# Patient Record
Sex: FEMALE | Race: WHITE | NOT HISPANIC OR LATINO | URBAN - METROPOLITAN AREA
[De-identification: names, ages, dates, MRNs, and addresses within clinical notes are randomized per-mention and may not be internally consistent; named-entity substitution may affect disease eponyms.]

---

## 2017-02-07 ENCOUNTER — ALLSCRIPTS OFFICE VISIT (OUTPATIENT)
Dept: OTHER | Facility: OTHER | Age: 47
End: 2017-02-07

## 2017-04-25 ENCOUNTER — ALLSCRIPTS OFFICE VISIT (OUTPATIENT)
Dept: OTHER | Facility: OTHER | Age: 47
End: 2017-04-25

## 2017-05-23 ENCOUNTER — ALLSCRIPTS OFFICE VISIT (OUTPATIENT)
Dept: OTHER | Facility: OTHER | Age: 47
End: 2017-05-23

## 2017-06-15 DIAGNOSIS — M79.89 OTHER DISORDERS OF SOFT TISSUE: ICD-10-CM

## 2017-06-22 ENCOUNTER — ALLSCRIPTS OFFICE VISIT (OUTPATIENT)
Dept: OTHER | Facility: OTHER | Age: 47
End: 2017-06-22

## 2017-06-30 ENCOUNTER — TRANSCRIBE ORDERS (OUTPATIENT)
Dept: ADMINISTRATIVE | Facility: HOSPITAL | Age: 47
End: 2017-06-30

## 2017-06-30 ENCOUNTER — APPOINTMENT (OUTPATIENT)
Dept: LAB | Facility: MEDICAL CENTER | Age: 47
End: 2017-06-30
Payer: COMMERCIAL

## 2017-06-30 DIAGNOSIS — M79.89 OTHER DISORDERS OF SOFT TISSUE: ICD-10-CM

## 2017-06-30 LAB — GLUCOSE P FAST SERPL-MCNC: 80 MG/DL (ref 65–99)

## 2017-06-30 PROCEDURE — 36415 COLL VENOUS BLD VENIPUNCTURE: CPT

## 2017-06-30 PROCEDURE — 82947 ASSAY GLUCOSE BLOOD QUANT: CPT

## 2017-07-11 ENCOUNTER — ALLSCRIPTS OFFICE VISIT (OUTPATIENT)
Dept: OTHER | Facility: OTHER | Age: 47
End: 2017-07-11

## 2017-07-11 LAB — HBA1C MFR BLD HPLC: 5.1 %

## 2017-08-01 DIAGNOSIS — E03.9 HYPOTHYROIDISM: ICD-10-CM

## 2017-08-01 DIAGNOSIS — R51 HEADACHE(784.0): ICD-10-CM

## 2017-08-01 DIAGNOSIS — R73.01 IMPAIRED FASTING GLUCOSE: ICD-10-CM

## 2017-08-01 DIAGNOSIS — R10.9 ABDOMINAL PAIN: ICD-10-CM

## 2017-08-01 DIAGNOSIS — E89.0 POSTPROCEDURAL HYPOTHYROIDISM: ICD-10-CM

## 2017-08-29 ENCOUNTER — GENERIC CONVERSION - ENCOUNTER (OUTPATIENT)
Dept: OTHER | Facility: OTHER | Age: 47
End: 2017-08-29

## 2017-08-31 ENCOUNTER — GENERIC CONVERSION - ENCOUNTER (OUTPATIENT)
Dept: OTHER | Facility: OTHER | Age: 47
End: 2017-08-31

## 2017-09-07 ENCOUNTER — GENERIC CONVERSION - ENCOUNTER (OUTPATIENT)
Dept: OTHER | Facility: OTHER | Age: 47
End: 2017-09-07

## 2017-10-10 ENCOUNTER — ALLSCRIPTS OFFICE VISIT (OUTPATIENT)
Dept: OTHER | Facility: OTHER | Age: 47
End: 2017-10-10

## 2017-10-11 NOTE — PROGRESS NOTES
Assessment  1  Acute URI (465 9) (J06 9)   2  Acute sinusitis (461 9) (J01 90)   3  Allergic rhinitis (477 9) (J30 9)   4  Cough (786 2) (R05)    Plan  Acute sinusitis    · Azithromycin 500 MG Oral Tablet; TAKE 1 TABLET DAILY UNTIL FINISHED  Acute sinusitis, Acute URI, Allergic rhinitis    · PredniSONE 10 MG Oral Tablet; TAKE 6 TABLETS TODAY, THEN DECREASE BY 1  TABLET EACH DAY UNTIL GONE  Acute sinusitis, Acute URI, Allergic rhinitis, Cough    · Follow-up PRN Evaluation and Treatment  Follow-up  Status: Complete  Done:  38CET3732 04:21PM    Discussion/Summary    Rest and fluids, start abx and prednisone and also advil cold and sinus prn congestion and Mucinex DM if not better with prednisone  Chief Complaint  started sunday with a headache and pressure - now has sore throat, mucus, coughing, sneezing, congestion, achy, right ear pain, hot and cold spells      History of Present Illness  HPI: Pt  started sunday with a headache and sinus pressure - now has sore throat, mucus, coughing, sneezing, congestion, achy, right ear pain, hot and cold spells  Hx of seasonal allergies and always worse this time of year  No cp or sob  Review of Systems    Constitutional: No fever, no chills, feels well, no tiredness, no recent weight gain or loss  ENT: as noted in HPI  Cardiovascular: no complaints of slow or fast heart rate, no chest pain, no palpitations, no leg claudication or lower extremity edema  Respiratory: as noted in HPI  Breasts: no complaints of breast pain, breast lump or nipple discharge  Gastrointestinal: no complaints of abdominal pain, no constipation, no nausea or diarrhea, no vomiting, no bloody stools  Genitourinary: no complaints of dysuria, no incontinence, no pelvic pain, no dysmenorrhea, no vaginal discharge or abnormal vaginal bleeding  Musculoskeletal: no complaints of arthralgia, no myalgia, no joint swelling or stiffness, no limb pain or swelling     Integumentary: no complaints of skin rash or lesion, no itching or dry skin, no skin wounds  Neurological: no complaints of headache, no confusion, no numbness or tingling, no dizziness or fainting  Active Problems  1  Abdominal pain (789 00) (R10 9)   2  Abnormal fasting glucose (790 29) (R73 01)   3  Acute low back pain (724 2) (M54 5)   4  Acute sinusitis (461 9) (J01 90)   5  Acute URI (465 9) (J06 9)   6  Allergic rhinitis (477 9) (J30 9)   7  Back pain (724 5) (M54 9)   8  Bilateral edema of lower extremity (782 3) (R60 0)   9  Breast lump (611 72) (N63)   10  Breast pain (611 71) (N64 4)   11  Carpal tunnel syndrome, unspecified laterality (354 0) (G56 00)   12  Closed head injury (959 01) (S09 90XA)   13  Concussion (850 9) (S06 0X9A)   14  Depression (311) (F32 9)   15  Ear ache (388 70) (H92 09)   16  Encounter for screening colonoscopy (V76 51) (Z12 11)   17  Eustachian tube disorder, left (381 9) (H69 92)   18  Eustachian tube dysfunction (381 81) (H69 80)   19  Female pelvic pain (625 9) (R10 2)   20  Folliculitis (663 4) (R15 1)   21  Hand pain, unspecified laterality   22  Headache (784 0) (R51)   23  Herpes simplex type 1 infection (054 9) (B00 9)   24  Hypothyroidism (244 9) (E03 9)   25  Laceration of face (873 40) (S01 81XA)   26  Left knee pain (719 46) (M25 562)   27  Low back pain (724 2) (M54 5)   28  Lumbar degenerative disc disease (722 52) (M51 36)   29  Neck pain (723 1) (M54 2)   30  Need for influenza vaccination (V04 81) (Z23)   31  Ovarian cyst (620 2) (N83 20)   32  Pain of left thigh (729 5) (M79 652)   33  Pain of right hand (729 5) (M79 641)   34  Papillary carcinoma of thyroid (193) (C73)   35  Pelvic and perineal pain (625 9) (R10 2)   36  Plantar fasciitis (728 71) (M72 2)   37  Postprocedural hypothyroidism (244 0) (E89 0)   38  Sacroiliac pain (724 6) (M53 3)   39  Sciatica of right side (724 3) (M54 31)   40  Seasonal allergies (477 9) (J30 2)   41   Somatic dysfunction of cervical region (739 1) (M99 01)   42  Somatic dysfunction of lumbar region (739 3) (M99 03)   43  Somatic dysfunction of spine, thoracic (739 2) (M99 02)   44  Sore throat (462) (J02 9)   45  Strain of lumbar region, initial encounter (847 2) (S39 012A)   46  Swelling of extremity (729 81) (M79 89)   47  Tinnitus of both ears (388 30) (H93 13)   48  UTI symptoms (788 99) (R39 9)   49  Vitamin D deficiency (268 9) (E55 9)    Past Medical History  1  History of Cervical neuritis (723 4) (M54 12)   2  History of Cervical Neuritis C7 - T1 On The Left (723 4)   3  History of Chest congestion (786 9) (R09 89)   4  History of Cough (786 2) (R05)   5  History of Cough (786 2) (R05)   6  History of Encounter for routine gynecological examination (V72 31) (Z01 419)   7  History of Encounter for screening mammogram for malignant neoplasm of breast   (V76 12) (Z12 31)   8  History of Endometriosis, cervix (617 0) (N80 0)   9  History of abdominal pain (V13 89) (Z87 898)   10  History of acute sinusitis (V12 69) (Z87 09)   11  History of constipation (V12 79) (Z87 19)   12  History of hyperlipidemia (V12 29) (Z86 39)   13  History of pregnancy (V13 29)   14  History of upper respiratory infection (V12 09) (Z87 09)   15  History of upper respiratory infection (V12 09) (Z87 09)   16  History of Laceration Of Finger (883 0)   17  History of Nasal congestion (478 19) (R09 81)   18  History of Need for influenza vaccination (V04 81) (Z23)   19  History of Somatic dysfunction of cervical region (739 1) (M99 01)   20  History of Somatic dysfunction of lumbar region (739 3) (M99 03)   21  History of Somatic dysfunction of thoracic region (739 2) (M99 02)   22  History of Thyroid Cancer (V10 87)   23  History of Urinary frequency (788 41) (R35 0)   24  History of Urinary tract infection (599 0) (N39 0)   25  History of Urinary Tract Infection (V13 02)    Family History  Mother    1  Family history of Thyroid Disorder (V18 19)  Sister    2   Family history of Thyroid Disorder (V18 19)  Family History    3  Family history of Cancer   4  Family history of Diabetes Mellitus (V18 0)    Social History   · Denied: History of Alcohol Use (History)   · Being A Social Drinker   · Denied: History of Drug Use   · Never A Smoker   · Non-smoker (V49 89) (Z78 9)    Surgical History  1  History of Complete Colonoscopy   2  History of Hysterectomy   3  History of Thyroid Surgery Total Thyroidectomy   4  History of Tubal Ligation    Current Meds   1  Calcium TABS; Therapy: (Recorded:80Brq6781) to Recorded   2  CVS Glucosamine Complex TABS; Therapy: (Recorded:20Sdi1857) to Recorded   3  CVS Vitamin D3 CAPS; Therapy: (Jerome Humphreys) to Recorded   4  Daily Vitamins Oral Tablet; Take 1 tablet daily; Therapy: (Recorded:21Jdg2348) to Recorded   5  Fish Oil CAPS; Therapy: (Stephen Martinez) to Recorded   6  HM Vitamin B12 TABS; Take 1 tablet daily; Therapy: (Jerome Humphreys) to Recorded   7  HydroCHLOROthiazide 12 5 MG Oral Tablet; TAKE 1 TABLET Daily 1 po tab prn edema; Therapy: 20HSV2509 to ([email or web address] Nohemi)  Requested for: 55JWQ9125; Last   Rx:55Utz3564 Ordered   8  Levothyroxine Sodium 100 MCG Oral Tablet; take 1 tablet every day; Therapy: 47PLC7759 to (VOZth)  Requested for: 54YCP9217; Last   Rx:95Gzx9922 Ordered   9  Lysine TABS; Therapy: (96 86 26) to Recorded   10  ValACYclovir HCl TABS; Therapy: (Stephen Martinez) to Recorded   11  Vitamin B6 TABS; Therapy: (Recorded:83Hsf2328) to Recorded    Allergies  1  Gabapentin CAPS  2  Animal dander - Cats   3  Animal dander - Dogs   4  Bee sting   5  Dust Mite   6  Grass   7  Other   8   Trees    Vitals   Recorded: 85MXL2842 03:59PM Recorded: 89DLW8349 03:54PM   Temperature 02 8 F    Systolic  648   Diastolic  60   Height  5 ft 5 5 in   Weight  186 lb    BMI Calculated  30 48   BSA Calculated  1 93     Physical Exam    Constitutional   General appearance: No acute distress, well appearing and well nourished  Eyes   Conjunctiva and lids: No swelling, erythema or discharge  Pupils and irises: Equal, round and reactive to light  Ears, Nose, Mouth, and Throat   External inspection of ears and nose: Normal     Otoscopic examination: Abnormal  -left TM bulging  Nasal mucosa, septum, and turbinates: Abnormal  -sinusitis  Oropharynx: Abnormal  -pnd  Pulmonary   Respiratory effort: No increased work of breathing or signs of respiratory distress  Auscultation of lungs: Abnormal  -cough  Cardiovascular   Palpation of heart: Normal PMI, no thrills  Auscultation of heart: Normal rate and rhythm, normal S1 and S2, without murmurs  Examination of extremities for edema and/or varicosities: Normal     Carotid pulses: Normal     Lymphatic   Palpation of lymph nodes in neck: No lymphadenopathy  Musculoskeletal   Gait and station: Normal     Digits and nails: Normal without clubbing or cyanosis  Inspection/palpation of joints, bones, and muscles: Normal     Skin   Skin and subcutaneous tissue: Normal without rashes or lesions  Neurologic   Cranial nerves: Cranial nerves 2-12 intact  Reflexes: 2+ and symmetric  Sensation: No sensory loss      Psychiatric   Orientation to person, place, and time: Normal     Mood and affect: Normal          Signatures   Electronically signed by : Berlin Garcia DO; Oct 10 2017  4:28PM EST                       (Author)

## 2017-11-03 ENCOUNTER — ALLSCRIPTS OFFICE VISIT (OUTPATIENT)
Dept: OTHER | Facility: OTHER | Age: 47
End: 2017-11-03

## 2018-01-10 NOTE — PROGRESS NOTES
Chief Complaint  pt here today for a UA, with c/o UTI sx  Active Problems    1  Abdominal pain (789 00) (R10 9)   2  Acute low back pain (724 2) (M54 5)   3  Acute sinusitis (461 9) (J01 90)   4  Allergic rhinitis (477 9) (J30 9)   5  Back pain (724 5) (M54 9)   6  Breast lump (611 72) (N63)   7  Breast pain (611 71) (N64 4)   8  Carpal tunnel syndrome, unspecified laterality (354 0) (G56 00)   9  Closed head injury (959 01) (S09 90XA)   10  Concussion (850 9) (S06 0X9A)   11  Depression (311) (F32 9)   12  Ear ache (388 70) (H92 09)   13  Encounter for screening colonoscopy (V76 51) (Z12 11)   14  Eustachian tube dysfunction (381 81) (H69 80)   15  Female pelvic pain (625 9) (R10 2)   16  Folliculitis (867 9) (H58 9)   17  Hand pain, unspecified laterality   18  Headache (784 0) (R51)   19  Herpes simplex type 1 infection (054 9) (B00 9)   20  Hypothyroidism (244 9) (E03 9)   21  Laceration of face (873 40) (S01 81XA)   22  Left knee pain (719 46) (M25 562)   23  Low back pain (724 2) (M54 5)   24  Lumbar degenerative disc disease (722 52) (M51 36)   25  Neck pain (723 1) (M54 2)   26  Need for influenza vaccination (V04 81) (Z23)   27  Ovarian cyst (620 2) (N83 20)   28  Pain of right hand (729 5) (M79 641)   29  Papillary carcinoma of thyroid (193) (C73)   30  Pelvic and perineal pain (625 9) (R10 2)   31  Postprocedural hypothyroidism (244 0) (E89 0)   32  Sacroiliac pain (724 6) (M53 3)   33  Sciatica of right side (724 3) (M54 31)   34  Seasonal allergies (477 9) (J30 2)   35  Somatic dysfunction of cervical region (739 1) (M99 01)   36  Somatic dysfunction of lumbar region (739 3) (M99 03)   37  Somatic dysfunction of spine, thoracic (739 2) (M99 02)   38  Strain of lumbar region, initial encounter (847 2) (S39 012A)   39  Tinnitus of both ears (388 30) (H93 13)    Current Meds   1  Calcium TABS; Therapy: (Recorded:16Oxk0850) to Recorded   2  CVS Glucosamine Complex TABS;    Therapy: (Recorded:50Zrc3603) to Recorded   3  CVS Vitamin D3 CAPS; Therapy: (Rolinda Muster) to Recorded   4  Daily Vitamins Oral Tablet; Take 1 tablet daily; Therapy: (Recorded:81Kzn2926) to Recorded   5  Fish Oil CAPS; Therapy: (Jahaira Barge) to Recorded   6  HM Vitamin B12 TABS; Take 1 tablet daily; Therapy: (Rolinda Muster) to Recorded   7  Levothyroxine Sodium 100 MCG Oral Tablet; take 1 tablet every day; Therapy: 86HVG1744 to (Anjum Zelaya)  Requested for: 26NSF3116; Last   Rx:14Nov2013 Ordered   8  Lysine TABS; Therapy: (Nicole Iha) to Recorded   9  PredniSONE 10 MG Oral Tablet; TAKE 6 TABLETS TODAY, THEN DECREASE BY 1   TABLET EACH DAY UNTIL GONE;   Therapy: 89ADG5842 to (Last Rx:24Fsu0608)  Requested for: 24Fje7704 Ordered   10  ValACYclovir HCl TABS; Therapy: (Jahaira Barge) to Recorded   11  Vitamin B6 TABS; Therapy: (Recorded:97Xvd3344) to Recorded    Allergies    1  Gabapentin CAPS    2  Animal dander - Cats   3  Animal dander - Dogs   4  Bee sting   5  Dust Mite   6  Grass   7  Other   8  Trees    Results/Data  Urine Dip Non-Automated- POC 68ETF8530 32:56KD Gabriela Multani     Test Name Result Flag Reference   Color Yellow     Clarity Transparent     Leukocytes ++     Nitrite +     Blood mod 5-10     Bilirubin neg     Urobilinogen 0 2     Protein +     Ph 5 0     Specific Gravity 1 030     Ketone neg     Glucose neg         Plan  UTI symptoms    · (1) URINE CULTURE; Source:Urine, Clean Catch; Status: In Progress - Specimen/Data  Collected,Retrospective By Protocol Authorization;   Done: 45OLA6046   · Urine Dip Non-Automated- POC; Status:Complete - Retrospective By Protocol  Authorization;   Done: 23QOI5168 04:56PM    Signatures   Electronically signed by : Tammie White DO; Oct 13 3537  3:38PM EST                       (Author)

## 2018-01-11 NOTE — PROGRESS NOTES
Chief Complaint  Pt presented for influenza vaccine; administered without incident and tolerated well  ksd,cma      Active Problems    1  Abdominal pain (789 00) (R10 9)   2  Abnormal fasting glucose (790 29) (R73 01)   3  Acute low back pain (724 2) (M54 5)   4  Acute sinusitis (461 9) (J01 90)   5  Acute URI (465 9) (J06 9)   6  Allergic rhinitis (477 9) (J30 9)   7  Back pain (724 5) (M54 9)   8  Bilateral edema of lower extremity (782 3) (R60 0)   9  Breast lump (611 72) (N63)   10  Breast pain (611 71) (N64 4)   11  Carpal tunnel syndrome, unspecified laterality (354 0) (G56 00)   12  Closed head injury (959 01) (S09 90XA)   13  Concussion (850 9) (S06 0X9A)   14  Cough (786 2) (R05)   15  Depression (311) (F32 9)   16  Ear ache (388 70) (H92 09)   17  Encounter for screening colonoscopy (V76 51) (Z12 11)   18  Eustachian tube disorder, left (381 9) (H69 92)   19  Eustachian tube dysfunction (381 81) (H69 80)   20  Female pelvic pain (625 9) (R10 2)   21  Folliculitis (652 4) (R60 2)   22  Hand pain, unspecified laterality   23  Headache (784 0) (R51)   24  Herpes simplex type 1 infection (054 9) (B00 9)   25  Hypothyroidism (244 9) (E03 9)   26  Laceration of face (873 40) (S01 81XA)   27  Left knee pain (719 46) (M25 562)   28  Low back pain (724 2) (M54 5)   29  Lumbar degenerative disc disease (722 52) (M51 36)   30  Neck pain (723 1) (M54 2)   31  Need for influenza vaccination (V04 81) (Z23)   32  Ovarian cyst (620 2) (N83 20)   33  Pain of left thigh (729 5) (M79 652)   34  Pain of right hand (729 5) (M79 641)   35  Papillary carcinoma of thyroid (193) (C73)   36  Pelvic and perineal pain (625 9) (R10 2)   37  Plantar fasciitis (728 71) (M72 2)   38  Postprocedural hypothyroidism (244 0) (E89 0)   39  Sacroiliac pain (724 6) (M53 3)   40  Sciatica of right side (724 3) (M54 31)   41  Seasonal allergies (477 9) (J30 2)   42  Somatic dysfunction of cervical region (739 1) (M99 01)   43   Somatic dysfunction of lumbar region (739 3) (M99 03)   44  Somatic dysfunction of spine, thoracic (739 2) (M99 02)   45  Sore throat (462) (J02 9)   46  Strain of lumbar region, initial encounter (847 2) (S39 012A)   47  Swelling of extremity (729 81) (M79 89)   48  Tinnitus of both ears (388 30) (H93 13)   49  UTI symptoms (788 99) (R39 9)   50  Vitamin D deficiency (268 9) (E55 9)    Current Meds   1  Azithromycin 500 MG Oral Tablet; TAKE 1 TABLET DAILY UNTIL FINISHED; Therapy: 82BMY0077 to (Evaluate:13Oct2017)  Requested for: 68NUC7127; Last   Rx:10Oct2017 Ordered   2  Calcium TABS; Therapy: (Recorded:23Xcl1313) to Recorded   3  Cefdinir 300 MG Oral Capsule; TAKE 1 CAPSULE TWICE DAILY; Therapy: 77LFN0238 to (90938 52 84 57)  Requested for: 02GSL1142; Last   Rx:17Oct2017 Ordered   4  CVS Glucosamine Complex TABS; Therapy: (Recorded:53Pyk3526) to Recorded   5  CVS Vitamin D3 CAPS; Therapy: (Angelic Lay) to Recorded   6  Daily Vitamins Oral Tablet; Take 1 tablet daily; Therapy: (Angelic Lay) to Recorded   7  Fish Oil CAPS; Therapy: (Larwence Crisp) to Recorded   8  HM Vitamin B12 TABS; Take 1 tablet daily; Therapy: (Angelic Lay) to Recorded   9  HydroCHLOROthiazide 12 5 MG Oral Tablet; TAKE 1 TABLET Daily 1 po tab prn edema; Therapy: 87BOT9775 to (Gerhardt Carbon)  Requested for: 62OYP4552; Last   Rx:00Mpi2453 Ordered   10  Levothyroxine Sodium 100 MCG Oral Tablet; take 1 tablet every day; Therapy: 87KAX3829 to (Ivette Salmon)  Requested for: 52YCU5035; Last    Rx:14Nov2013 Ordered   11  Lysine TABS; Therapy: (Canelo Galla) to Recorded   12  PredniSONE 10 MG Oral Tablet; TAKE 6 TABLETS TODAY, THEN DECREASE BY 1    TABLET EACH DAY UNTIL GONE;    Therapy: 65HWH0902 to (Last Rx:10Oct2017)  Requested for: 11IBQ0410 Ordered   13  ValACYclovir HCl TABS; Therapy: (Larwence Crisp) to Recorded   14  Vitamin B6 TABS;     Therapy: (Recorded:86Gek8906) to Recorded    Allergies    1  Gabapentin CAPS    2  Animal dander - Cats   3  Animal dander - Dogs   4  Bee sting   5  Dust Mite   6  Grass   7  Other   8   Trees    Plan  Flu vaccine need    · Fluzone Quadrivalent Intramuscular Suspension    Signatures   Electronically signed by : Alexander Wong DO; Nov  3 2017  9:11AM EST                       (Author)

## 2018-01-12 VITALS
BODY MASS INDEX: 29.79 KG/M2 | SYSTOLIC BLOOD PRESSURE: 108 MMHG | DIASTOLIC BLOOD PRESSURE: 60 MMHG | WEIGHT: 185.38 LBS | HEIGHT: 66 IN

## 2018-01-13 VITALS
WEIGHT: 181 LBS | DIASTOLIC BLOOD PRESSURE: 68 MMHG | SYSTOLIC BLOOD PRESSURE: 102 MMHG | TEMPERATURE: 98.1 F | HEIGHT: 66 IN | BODY MASS INDEX: 29.09 KG/M2

## 2018-01-13 VITALS
BODY MASS INDEX: 29.89 KG/M2 | DIASTOLIC BLOOD PRESSURE: 60 MMHG | HEIGHT: 66 IN | SYSTOLIC BLOOD PRESSURE: 100 MMHG | TEMPERATURE: 99.8 F | WEIGHT: 186 LBS

## 2018-01-13 VITALS
DIASTOLIC BLOOD PRESSURE: 68 MMHG | SYSTOLIC BLOOD PRESSURE: 100 MMHG | HEIGHT: 66 IN | WEIGHT: 188 LBS | BODY MASS INDEX: 30.22 KG/M2

## 2018-01-13 NOTE — PROGRESS NOTES
Assessment    1  Acute low back pain (724 2) (M54 5)   2  Sciatica of right side (724 3) (M54 31)   3  Hypothyroidism (244 9) (E03 9)   4  Acute sinusitis (461 9) (J01 90)    Plan  Acute low back pain, Acute sinusitis, Hypothyroidism, Sciatica of right side    · Follow-up PRN Evaluation and Treatment  Follow-up  Status: Complete  Done:  [id number]  Acute sinusitis    · Azithromycin 500 MG Oral Tablet; TAKE 1 TABLET DAILY UNTIL FINISHED   · Fluticasone Propionate 50 MCG/ACT Nasal Suspension; USE 2 SPRAYS IN EACH  NOSTRIL DAILY   · PredniSONE 10 MG Oral Tablet; TAKE 6 TABLETS TODAY, THEN DECREASE BY 1  TABLET EACH DAY UNTIL GONE    Discussion/Summary    Rest and fluids, Mucinex D prn, start meds as directed and use [place] or Icy hot prn low back and right buttock area prn low back pain and right sciatica  The patient was counseled regarding  Possible side effects of new medications were reviewed with the patient/guardian today  The treatment plan was reviewed with the patient/guardian  The patient/guardian understands and agrees with the treatment plan      Chief Complaint  PT is here today for coughing, sneezing, white colored mucus, runny nose, water eyes, congestion for 1 week  PT also states she has lower R back pain and radiates down in to her leg  History of Present Illness  HPI: PT is here today for coughing, sneezing, white colored mucus, runny nose, water eyes, congestion for 1 week  PT also states she has lower R back pain and radiates down in to her leg  Low right sided back pain  Dx as a strain at urgent care  No xrays done  Review of Systems    Constitutional: No fever, no chills, feels well, no tiredness, no recent weight gain or loss  ENT: as noted in HPI  Cardiovascular: no complaints of slow or fast heart rate, no chest pain, no palpitations, no leg claudication or lower extremity edema  Respiratory: as noted in HPI     Breasts: no complaints of breast pain, breast lump or nipple discharge  Gastrointestinal: no complaints of abdominal pain, no constipation, no nausea or diarrhea, no vomiting, no bloody stools  Genitourinary: no complaints of dysuria, no incontinence, no pelvic pain, no dysmenorrhea, no vaginal discharge or abnormal vaginal bleeding  Musculoskeletal: as noted in HPI  Integumentary: no complaints of skin rash or lesion, no itching or dry skin, no skin wounds  Neurological: no complaints of headache, no confusion, no numbness or tingling, no dizziness or fainting  Active Problems    1  Abdominal pain (789 00) (R10 9)   2  Allergic rhinitis (477 9) (J30 9)   3  Back pain (724 5) (M54 9)   4  Breast lump (611 72) (N63)   5  Breast pain (611 71) (N64 4)   6  Carpal tunnel syndrome, unspecified laterality (354 0) (G56 00)   7  Closed head injury (959 01) (S09 90XA)   8  Concussion (850 9) (S06 0X9A)   9  Depression (311) (F32 9)   10  Ear ache (388 70) (H92 09)   11  Encounter for screening colonoscopy (V76 51) (Z12 11)   12  Female pelvic pain (625 9) (R10 2)   13  Folliculitis (554 2) (H13 5)   14  Hand pain, unspecified laterality   15  Headache (784 0) (R51)   16  Herpes simplex type 1 infection (054 9) (B00 9)   17  Hypothyroidism (244 9) (E03 9)   18  Laceration of face (873 40) (S01 81XA)   19  Left knee pain (719 46) (M25 562)   20  Low back pain (724 2) (M54 5)   21  Lumbar degenerative disc disease (722 52) (M51 36)   22  Neck pain (723 1) (M54 2)   23  Need for influenza vaccination (V04 81) (Z23)   24  Ovarian cyst (620 2) (N83 20)   25  Papillary carcinoma of thyroid (193) (C73)   26  Pelvic and perineal pain (625 9) (R10 2)   27  Postprocedural hypothyroidism (244 0) (E89 0)   28  Sacroiliac pain (724 6) (M53 3)   29  Somatic dysfunction of cervical region (739 1) (M99 01)   30  Somatic dysfunction of lumbar region (739 3) (M99 03)   31  Somatic dysfunction of spine, thoracic (739 2) (M99 02)   32   Strain of lumbar region, initial encounter (847 2) (Y26 940S)    Past Medical History    1  History of Cervical neuritis (723 4) (M54 12)   2  History of Cervical Neuritis C7 - T1 On The Left (723 4)   3  History of Chest congestion (786 9) (R09 89)   4  History of Cough (786 2) (R05)   5  History of Cough (786 2) (R05)   6  History of Encounter for routine gynecological examination (V72 31) (Z01 419)   7  History of Encounter for screening mammogram for malignant neoplasm of breast   (V76 12) (Z12 31)   8  History of Endometriosis, cervix (617 0) (N80 0)   9  History of abdominal pain (V13 89) (Z87 898)   10  History of acute sinusitis (V12 69) (Z87 09)   11  History of constipation (V12 79) (Z87 19)   12  History of hyperlipidemia (V12 29) (Z86 39)   13  History of pregnancy (V13 29)   14  History of upper respiratory infection (V12 09) (Z87 09)   15  History of upper respiratory infection (V12 09) (Z87 09)   16  History of Laceration Of Finger (883 0)   17  History of Nasal congestion (478 19) (R09 81)   18  History of Need for influenza vaccination (V04 81) (Z23)   19  History of Somatic dysfunction of cervical region (739 1) (M99 01)   20  History of Somatic dysfunction of lumbar region (739 3) (M99 03)   21  History of Somatic dysfunction of thoracic region (739 2) (M99 02)   22  History of Thyroid Cancer (V10 87)   23  History of Urinary frequency (788 41) (R35 0)   24  History of Urinary tract infection (599 0) (N39 0)   25  History of Urinary Tract Infection (V13 02)    Family History    1  Family history of Thyroid Disorder (V18 19)    2  Family history of Thyroid Disorder (V18 19)    3  Family history of Cancer   4  Family history of Diabetes Mellitus (V18 0)    Social History    · Denied: History of Alcohol Use (History)   · Being A Social Drinker   · Denied: History of Drug Use   · Never A Smoker   · Non-smoker (V49 89) (Z78 9)    Surgical History    1  History of Complete Colonoscopy   2  History of Hysterectomy   3   History of Thyroid Surgery Total Thyroidectomy   4  History of Tubal Ligation    Current Meds   1  Calcium TABS; Therapy: (Recorded:48Alj1044) to Recorded   2  [facility] Glucosamine Complex TABS; Therapy: (Recorded:16Oqv1160) to Recorded   3  [facility] Vitamin D3 CAPS; Therapy: (Kulwinder Salmon) to Recorded   4  Daily Vitamins Oral Tablet; Take 1 tablet daily; Therapy: (Recorded:45Xjj2943) to Recorded   5  HM Vitamin B12 TABS; Take 1 tablet daily; Therapy: (Kulwinder Salmon) to Recorded   6  Levothyroxine Sodium 100 MCG Oral Tablet; take 1 tablet every day; Therapy: 33DWO5622 to (Millie Blanco)  Requested for: 76POI2428; Last   Rx:14Nov2013 Ordered   7  Lysine TABS; Therapy: (0688 [id number]) to Recorded   8  Naproxen 500 MG Oral Tablet; TAKE 1 TABLET EVERY 12 HOURS AS NEEDED; Therapy: 02SXZ7744 to (Evaluate:19Jan2016)  Requested for: 11EYB1766; Last   Rx:04Jan2016 Ordered   9  Vitamin B6 TABS; Therapy: (Recorded:82Xzw4913) to Recorded    Allergies    1  Gabapentin CAPS    2  Animal dander - Cats   3  Animal dander - Dogs   4  Bee sting   5  Dust Mite   6  Grass   7  Other   8  Trees    Vitals   Recorded: 32VBC3741 64:45IV   Systolic 196   Diastolic 70   Weight 565 lb      Physical Exam    Constitutional   General appearance: No acute distress, well appearing and well nourished  Eyes   Conjunctiva and lids: No swelling, erythema or discharge  Pupils and irises: Equal, round and reactive to light  Ears, Nose, Mouth, and Throat   External inspection of ears and nose: Normal     Otoscopic examination: Tympanic membranes translucent with normal light reflex  Canals patent without erythema  Nasal mucosa, septum, and turbinates: Abnormal   nasal congestion  Oropharynx: Abnormal   pnd    Pulmonary   Respiratory effort: No increased work of breathing or signs of respiratory distress  Auscultation of lungs: Abnormal   cough  Cardiovascular   Palpation of heart: Normal PMI, no thrills      Auscultation of heart: Normal rate and rhythm, normal S1 and S2, without murmurs  Examination of extremities for edema and/or varicosities: Normal     Carotid pulses: Normal     Lymphatic   Palpation of lymph nodes in neck: No lymphadenopathy  Musculoskeletal   Gait and station: Normal     Digits and nails: Normal without clubbing or cyanosis  Inspection/palpation of joints, bones, and muscles: Abnormal   right sided sciatica and low back pain right side  Skin   Skin and subcutaneous tissue: Normal without rashes or lesions  Neurologic   Cranial nerves: Cranial nerves 2-12 intact  Reflexes: 2+ and symmetric  Sensation: No sensory loss      Psychiatric   Orientation to person, place, and time: Normal     Mood and affect: Normal          Signatures   Electronically signed by : Nuzhat Esquivel DO; Jan 21 2016  1:37PM EST                       (Author)

## 2018-01-14 VITALS
SYSTOLIC BLOOD PRESSURE: 110 MMHG | WEIGHT: 184.13 LBS | DIASTOLIC BLOOD PRESSURE: 60 MMHG | BODY MASS INDEX: 29.59 KG/M2 | HEIGHT: 66 IN

## 2018-01-14 VITALS
DIASTOLIC BLOOD PRESSURE: 82 MMHG | HEIGHT: 66 IN | BODY MASS INDEX: 29.63 KG/M2 | SYSTOLIC BLOOD PRESSURE: 112 MMHG | TEMPERATURE: 97.9 F | WEIGHT: 184.38 LBS

## 2018-01-22 VITALS
WEIGHT: 185.13 LBS | SYSTOLIC BLOOD PRESSURE: 108 MMHG | BODY MASS INDEX: 29.75 KG/M2 | DIASTOLIC BLOOD PRESSURE: 74 MMHG | HEIGHT: 66 IN

## 2018-08-17 ENCOUNTER — TRANSCRIBE ORDERS (OUTPATIENT)
Dept: URGENT CARE | Age: 48
End: 2018-08-17

## 2018-08-17 ENCOUNTER — APPOINTMENT (OUTPATIENT)
Dept: LAB | Age: 48
End: 2018-08-17
Payer: COMMERCIAL

## 2018-08-17 DIAGNOSIS — E89.0 POSTSURGICAL HYPOTHYROIDISM: ICD-10-CM

## 2018-08-17 DIAGNOSIS — C73 MALIGNANT NEOPLASM OF THYROID GLAND (HCC): ICD-10-CM

## 2018-08-17 DIAGNOSIS — C73 MALIGNANT NEOPLASM OF THYROID GLAND (HCC): Primary | ICD-10-CM

## 2018-08-17 LAB
T4 FREE SERPL-MCNC: 0.96 NG/DL (ref 0.76–1.46)
TSH SERPL DL<=0.05 MIU/L-ACNC: 3.49 UIU/ML (ref 0.36–3.74)

## 2018-08-17 PROCEDURE — 84443 ASSAY THYROID STIM HORMONE: CPT

## 2018-08-17 PROCEDURE — 36415 COLL VENOUS BLD VENIPUNCTURE: CPT

## 2018-08-17 PROCEDURE — 84439 ASSAY OF FREE THYROXINE: CPT

## 2018-08-17 PROCEDURE — 86800 THYROGLOBULIN ANTIBODY: CPT

## 2018-08-17 PROCEDURE — 84432 ASSAY OF THYROGLOBULIN: CPT

## 2018-08-18 LAB
THYROGLOB AB SERPL-ACNC: <1 IU/ML (ref 0–0.9)
THYROGLOB SERPL-MCNC: <0.1 NG/ML (ref 1.5–38.5)

## 2018-09-26 ENCOUNTER — OFFICE VISIT (OUTPATIENT)
Dept: FAMILY MEDICINE CLINIC | Facility: CLINIC | Age: 48
End: 2018-09-26
Payer: COMMERCIAL

## 2018-09-26 VITALS
DIASTOLIC BLOOD PRESSURE: 72 MMHG | TEMPERATURE: 98.9 F | HEIGHT: 66 IN | WEIGHT: 182 LBS | BODY MASS INDEX: 29.25 KG/M2 | SYSTOLIC BLOOD PRESSURE: 112 MMHG

## 2018-09-26 DIAGNOSIS — B97.89 SORE THROAT (VIRAL): Primary | ICD-10-CM

## 2018-09-26 DIAGNOSIS — B34.9 VIRAL SYNDROME: ICD-10-CM

## 2018-09-26 DIAGNOSIS — R73.01 ABNORMAL FASTING GLUCOSE: ICD-10-CM

## 2018-09-26 DIAGNOSIS — J02.8 SORE THROAT (VIRAL): Primary | ICD-10-CM

## 2018-09-26 DIAGNOSIS — E89.0 POSTOPERATIVE HYPOTHYROIDISM: ICD-10-CM

## 2018-09-26 PROBLEM — E55.9 VITAMIN D DEFICIENCY: Status: ACTIVE | Noted: 2017-07-12

## 2018-09-26 PROCEDURE — 99213 OFFICE O/P EST LOW 20 MIN: CPT | Performed by: FAMILY MEDICINE

## 2018-09-26 PROCEDURE — 3008F BODY MASS INDEX DOCD: CPT | Performed by: FAMILY MEDICINE

## 2018-09-26 RX ORDER — HYDROCHLOROTHIAZIDE 12.5 MG/1
TABLET ORAL DAILY
COMMUNITY
Start: 2017-07-11 | End: 2018-09-26

## 2018-09-26 RX ORDER — CEFDINIR 300 MG/1
1 CAPSULE ORAL 2 TIMES DAILY
COMMUNITY
Start: 2017-10-17 | End: 2018-09-26

## 2018-09-26 RX ORDER — VALACYCLOVIR HYDROCHLORIDE 1 G/1
TABLET, FILM COATED ORAL
COMMUNITY
Start: 2015-07-03 | End: 2020-01-13 | Stop reason: SDUPTHER

## 2018-09-26 RX ORDER — LEVOTHYROXINE SODIUM 0.12 MG/1
125 TABLET ORAL DAILY
Qty: 30 TABLET | Refills: 5 | Status: SHIPPED | OUTPATIENT
Start: 2018-09-26 | End: 2019-05-22

## 2018-09-26 RX ORDER — LEVOTHYROXINE SODIUM 0.1 MG/1
1 TABLET ORAL DAILY
COMMUNITY
Start: 2013-05-25 | End: 2018-09-26

## 2018-09-26 RX ORDER — PREDNISONE 10 MG/1
TABLET ORAL
COMMUNITY
Start: 2017-10-10 | End: 2018-09-26

## 2018-09-26 RX ORDER — CITALOPRAM 10 MG/1
10 TABLET ORAL
COMMUNITY
Start: 2014-08-08 | End: 2018-09-26

## 2018-09-26 RX ORDER — AZITHROMYCIN 500 MG/1
1 TABLET, FILM COATED ORAL DAILY
COMMUNITY
Start: 2017-10-10 | End: 2018-09-26

## 2018-09-26 NOTE — PROGRESS NOTES
Assessment/Plan:    Rapid strep was negative therefore treat his viral syndrome  Supportive measures reviewed  Patient has a history of thyroid cancer  Last TSH was 2 elevated  She does not see Endocrinology for at least a month  She is tired  I raised her levothyroxine to 125 with a repeat lab 2-3 days prior to seeing Dr Nadine Purcell [facility]  Patient also turning 48 next week  Colonoscopies are up-to-date  Patient will check with her insurance whether not a yearly physical is covered and/or labs  Recommend mammography yearly  Diagnoses and all orders for this visit:    Sore throat (viral)    Viral syndrome    Postoperative hypothyroidism  -     levothyroxine 125 mcg tablet; Take 1 tablet (125 mcg total) by mouth daily  -     TSH, 3rd generation; Future  -     T4, free; Future    Abnormal fasting glucose        There are no Patient Instructions on file for this visit  Subjective:        Patient ID: Mauri Wellington is a 50 y o  female  Chief Complaint   Patient presents with    Generalized Body Aches     cough, phlegm, chills sore throat/ ? tdap ?pneumo       Patient here with upper respiratory symptoms and sore throat for approximately for 5-7 days        The following portions of the patient's history were reviewed and updated as appropriate: past medical history, past surgical history and problem list       Review of Systems   Constitutional: Positive for fatigue  Negative for fever  HENT: Positive for congestion and sore throat  Negative for ear pain  Respiratory: Negative for cough and shortness of breath  Gastrointestinal: Negative for nausea  Genitourinary: Negative for dysuria  Objective:  /72 (BP Location: Left arm, Patient Position: Sitting, Cuff Size: Standard)   Temp 98 9 °F (37 2 °C)   Ht 5' 6" (1 676 m)   Wt 82 6 kg (182 lb)   BMI 29 38 kg/m²        Physical Exam   Constitutional: She is oriented to person, place, and time  She appears well-nourished  No distress  HENT:   Head: Normocephalic  Right Ear: External ear normal    Nose: Nose normal    Mouth/Throat: Oropharynx is clear and moist  No oropharyngeal exudate  Eyes: Conjunctivae are normal    Cardiovascular: Normal rate, regular rhythm and normal heart sounds  No murmur heard  Pulmonary/Chest: Breath sounds normal    Lymphadenopathy:     She has no cervical adenopathy  Neurological: She is alert and oriented to person, place, and time  Nursing note and vitals reviewed

## 2018-10-08 ENCOUNTER — OFFICE VISIT (OUTPATIENT)
Dept: FAMILY MEDICINE CLINIC | Facility: CLINIC | Age: 48
End: 2018-10-08
Payer: COMMERCIAL

## 2018-10-08 VITALS
SYSTOLIC BLOOD PRESSURE: 100 MMHG | TEMPERATURE: 99.2 F | HEIGHT: 66 IN | BODY MASS INDEX: 29.18 KG/M2 | WEIGHT: 181.6 LBS | DIASTOLIC BLOOD PRESSURE: 70 MMHG

## 2018-10-08 DIAGNOSIS — J01.00 ACUTE NON-RECURRENT MAXILLARY SINUSITIS: Primary | ICD-10-CM

## 2018-10-08 DIAGNOSIS — R05.9 COUGH: ICD-10-CM

## 2018-10-08 PROCEDURE — 99213 OFFICE O/P EST LOW 20 MIN: CPT | Performed by: FAMILY MEDICINE

## 2018-10-08 RX ORDER — AZITHROMYCIN 500 MG/1
500 TABLET, FILM COATED ORAL DAILY
Qty: 3 TABLET | Refills: 0 | Status: SHIPPED | OUTPATIENT
Start: 2018-10-08 | End: 2018-10-11

## 2018-10-08 RX ORDER — PROMETHAZINE HYDROCHLORIDE AND CODEINE PHOSPHATE 6.25; 1 MG/5ML; MG/5ML
5 SYRUP ORAL EVERY 4 HOURS PRN
Qty: 120 ML | Refills: 0 | Status: SHIPPED | OUTPATIENT
Start: 2018-10-08 | End: 2018-10-30 | Stop reason: ALTCHOICE

## 2018-10-08 NOTE — PROGRESS NOTES
Assessment/Plan:    Treat as likely sinusitis  3 day Z-Matt sent to pharmacy along with promethazine codeine  Diagnoses and all orders for this visit:    Acute non-recurrent maxillary sinusitis  -     azithromycin (ZITHROMAX) 500 MG tablet; Take 1 tablet (500 mg total) by mouth daily for 3 days    Cough  -     promethazine-codeine (PHENERGAN WITH CODEINE) 6 25-10 mg/5 mL syrup; Take 5 mL by mouth every 4 (four) hours as needed for cough        1  Acute non-recurrent maxillary sinusitis  azithromycin (ZITHROMAX) 500 MG tablet   2  Cough  promethazine-codeine (PHENERGAN WITH CODEINE) 6 25-10 mg/5 mL syrup       Subjective:        Patient ID: Yvonne Stephenson is a 46 y o  female  Chief Complaint   Patient presents with    URI     Sx started last week, pt states that he nose got stuffy and mucus, sore throat, ears are clogged, pt states that she was got and cold flashes yesterday       Patient seen this week for possible strep  Rapid strep was negative  Now with sinus like symptoms and cough  Symptoms have been there greater than 7 days  No fever or chills  The following portions of the patient's history were reviewed and updated as appropriate: past medical history, past surgical history and problem list       Review of Systems   Constitutional: Negative for fatigue and fever  HENT: Positive for congestion, postnasal drip, rhinorrhea and sinus pressure  Respiratory: Positive for choking  Negative for wheezing  Cardiovascular: Negative for chest pain  Objective:  /70 (BP Location: Left arm, Patient Position: Sitting, Cuff Size: Standard)   Temp 99 2 °F (37 3 °C)   Ht 5' 6" (1 676 m)   Wt 82 4 kg (181 lb 9 6 oz)   BMI 29 31 kg/m²        Physical Exam   Constitutional: She appears well-nourished     HENT:   Right Ear: Tympanic membrane and external ear normal    Left Ear: Tympanic membrane and external ear normal    Mouth/Throat: Oropharynx is clear and moist  No oropharyngeal exudate (For the have all had the a the all the are is all the will for will her all the her her all the the the go for all the the all over will all the review her why the for all all all the the Show the for for the her will all the her the all the the her the th)  Minimal nasal congestion   Cardiovascular: Normal heart sounds  Pulmonary/Chest: Breath sounds normal  She has no wheezes  Lymphadenopathy:     She has no cervical adenopathy  Neurological: She is alert  Psychiatric: She has a normal mood and affect  Nursing note and vitals reviewed

## 2018-10-29 ENCOUNTER — APPOINTMENT (OUTPATIENT)
Dept: LAB | Age: 48
End: 2018-10-29
Payer: COMMERCIAL

## 2018-10-29 DIAGNOSIS — E89.0 POSTOPERATIVE HYPOTHYROIDISM: ICD-10-CM

## 2018-10-29 LAB
T4 FREE SERPL-MCNC: 1.71 NG/DL (ref 0.76–1.46)
TSH SERPL DL<=0.05 MIU/L-ACNC: 0.04 UIU/ML (ref 0.36–3.74)

## 2018-10-29 PROCEDURE — 84443 ASSAY THYROID STIM HORMONE: CPT

## 2018-10-29 PROCEDURE — 36415 COLL VENOUS BLD VENIPUNCTURE: CPT

## 2018-10-29 PROCEDURE — 84439 ASSAY OF FREE THYROXINE: CPT

## 2018-10-30 ENCOUNTER — OFFICE VISIT (OUTPATIENT)
Dept: FAMILY MEDICINE CLINIC | Facility: CLINIC | Age: 48
End: 2018-10-30
Payer: COMMERCIAL

## 2018-10-30 VITALS
SYSTOLIC BLOOD PRESSURE: 110 MMHG | DIASTOLIC BLOOD PRESSURE: 70 MMHG | HEIGHT: 66 IN | BODY MASS INDEX: 29.25 KG/M2 | WEIGHT: 182 LBS

## 2018-10-30 DIAGNOSIS — Z23 NEED FOR INFLUENZA VACCINATION: ICD-10-CM

## 2018-10-30 DIAGNOSIS — H69.93 DISORDER OF BOTH EUSTACHIAN TUBES: Primary | ICD-10-CM

## 2018-10-30 PROCEDURE — 90682 RIV4 VACC RECOMBINANT DNA IM: CPT | Performed by: NURSE PRACTITIONER

## 2018-10-30 PROCEDURE — 3008F BODY MASS INDEX DOCD: CPT | Performed by: NURSE PRACTITIONER

## 2018-10-30 PROCEDURE — 90471 IMMUNIZATION ADMIN: CPT | Performed by: NURSE PRACTITIONER

## 2018-10-30 PROCEDURE — 99213 OFFICE O/P EST LOW 20 MIN: CPT | Performed by: NURSE PRACTITIONER

## 2018-10-30 RX ORDER — PROPRANOLOL/HYDROCHLOROTHIAZID 40 MG-25MG
1 TABLET ORAL DAILY
COMMUNITY

## 2018-10-30 RX ORDER — CALCIUM CARBONATE 300MG(750)
1 TABLET,CHEWABLE ORAL DAILY
COMMUNITY

## 2018-10-30 RX ORDER — PREDNISONE 20 MG/1
40 TABLET ORAL DAILY
Qty: 6 TABLET | Refills: 0 | Status: SHIPPED | OUTPATIENT
Start: 2018-10-30 | End: 2018-11-02

## 2018-10-30 RX ORDER — FLUTICASONE PROPIONATE 50 MCG
1 SPRAY, SUSPENSION (ML) NASAL DAILY
Qty: 16 G | Refills: 0 | Status: SHIPPED | OUTPATIENT
Start: 2018-10-30 | End: 2019-12-27

## 2018-10-30 NOTE — PATIENT INSTRUCTIONS
Start prednisone, this is 40 mg daily for 3 days, take with food  Do not take with aleve/advil/motrin  Take earlier in the day rather than later  Start Flonase, this is one puff each nostril daily  Call us if you experience any worsening symptoms or no improvement  Eustachian Tube Dysfunction   WHAT YOU NEED TO KNOW:   What is eustachian tube dysfunction? Eustachian tube dysfunction (ETD) is a condition that prevents your eustachian tubes from opening properly  It can also cause them to become blocked  Eustachian tubes connect your middle ear to the back of your nose and throat  These tubes open and allow air to flow in and out when you sneeze, swallow, or yawn  What causes or increases my risk of ETD? ETD may be caused by swelling or buildup of mucus in your eustachian tubes  Allergies, a cold, or sinus infection can increase your risk for ETD  Smoking also increases your risk for ETD  What are the signs and symptoms of ETD? · Fullness or pressure in your ears    · Muffled hearing    · Pain in one or both ears    · Ringing in your ears    · Popping or clicking feeling in your ears    · Trouble keeping your balance  How is ETD diagnosed? Your healthcare provider will ask about your symptoms  He will examine your ears, your nose, and the back of your throat  He may also do a hearing test    How is ETD treated? Your ETD may get better on its own without any treatment  You may need any of the following:  · Exercises  such as swallowing, yawning, or chewing gum may help to open your eustachian tubes  Your healthcare provider may also recommend that you take a deep breath and then blow with your mouth shut and your nostrils pinched closed  · Air pressure devices  push air into your nose and eustachian tubes to help relieve air pressure in your ear  · Treatment for allergies  such as decongestants, antihistamines, and nasal steroids may improve ETD   They may help decrease swelling of the eustachian tubes      · Ear tubes  may help to keep your middle ear open  During this procedure, your healthcare provider will cut a small hole in your eardrum  · A myringotomy  is procedure to make a small cut in your eardrum and suction out fluid from your middle ear  · Tuboplasty  is a procedure to widen your eustachian tubes  When should I contact my healthcare provider? · Your symptoms do not improve or get worse  · You have a fever  · You have any hearing loss  · You have questions or concerns about your condition or care  CARE AGREEMENT:   You have the right to help plan your care  Learn about your health condition and how it may be treated  Discuss treatment options with your caregivers to decide what care you want to receive  You always have the right to refuse treatment  The above information is an  only  It is not intended as medical advice for individual conditions or treatments  Talk to your doctor, nurse or pharmacist before following any medical regimen to see if it is safe and effective for you  © 2017 [address] Information is for End User's use only and may not be sold, redistributed or otherwise used for commercial purposes  All illustrations and images included in CareNotes® are the copyrighted property of A D A M , Inc  or Valentin Ribeiro

## 2018-10-30 NOTE — PROGRESS NOTES
Assessment/Plan:    Eustachian Tube dysfunction  Start prednisone burst 40mg daily for 3 days and start flonase  Call us if you experience any worsening symptoms or no improvement  If no improvement will refer to ENT  Handout provided  Diagnoses and all orders for this visit:    Disorder of both eustachian tubes  -     predniSONE 20 mg tablet; Take 2 tablets (40 mg total) by mouth daily for 3 days  -     fluticasone (FLONASE) 50 mcg/act nasal spray; 1 spray into each nostril daily    Need for influenza vaccination  -     influenza vaccine, 2400-9200, quadrivalent, recombinant, PF, 0 5 mL, for patients 18 yr+ (FLUBLOK)    Other orders  -     cholecalciferol (VITAMIN D3) 1,000 units tablet; 2,000 Units  -     lysine 500 MG TABS; Take by mouth  -     Turmeric 400 MG CAPS; Take 1 tablet by mouth  -     Magnesium 400 MG TABS; Take by mouth      Patient verbalizes understand and agrees with treatment plan  Subjective:        Patient ID: Betty Scott is a 46 y o  female  Chief Complaint   Patient presents with    Ear Problem     pt states she woke up yesterday and had some "hollowing" in her ears  states there is no pain or other sx   Immunizations     flu and adacel       Patient presents to office for sound of wind in her ear  She has complaint of some congestion recently but this morning she woke up and her hearing is echoed  She states she feels like her head is in a bubble  Denies ear pain or drainage  The following portions of the patient's history were reviewed and updated as appropriate: allergies, current medications, past family history, past social history and problem list     Review of Systems   Constitutional: Negative for chills and fever  HENT: Positive for congestion  Negative for ear discharge and ear pain  Eyes: Negative for pain and visual disturbance  Respiratory: Negative for cough and shortness of breath      Cardiovascular: Negative for chest pain, palpitations and leg swelling  Gastrointestinal: Negative for abdominal pain, diarrhea, nausea and vomiting  Genitourinary: Negative for difficulty urinating and dysuria  Musculoskeletal: Negative for arthralgias and myalgias  Skin: Negative for color change and rash  Neurological: Negative for dizziness, syncope, numbness and headaches  Hematological: Negative for adenopathy  Psychiatric/Behavioral: Negative for agitation and behavioral problems  The patient is not nervous/anxious  Objective:  /70 (BP Location: Left arm, Patient Position: Sitting, Cuff Size: Standard)   Ht 5' 5 5" (1 664 m)   Wt 82 6 kg (182 lb)   BMI 29 83 kg/m²      Physical Exam   Constitutional: She is oriented to person, place, and time  She appears well-developed  No distress  HENT:   Head: Normocephalic and atraumatic  Right Ear: External ear normal  No drainage, swelling or tenderness  Tympanic membrane is not perforated, not erythematous, not retracted and not bulging  A middle ear effusion is present  Left Ear: External ear normal  No drainage, swelling or tenderness  Tympanic membrane is retracted  Tympanic membrane is not perforated, not erythematous and not bulging  A middle ear effusion is present  Nose: Nose normal    Mouth/Throat: No oropharyngeal exudate, posterior oropharyngeal edema or posterior oropharyngeal erythema  Eyes: Conjunctivae and lids are normal  Right eye exhibits no discharge  Left eye exhibits no discharge  Neck: Normal range of motion  Neck supple  No tracheal deviation present  Cardiovascular: Normal rate and regular rhythm  No murmur heard  Pulmonary/Chest: Effort normal and breath sounds normal  No respiratory distress  She has no wheezes  Abdominal: Soft  Bowel sounds are normal  She exhibits no distension  There is no tenderness  There is no guarding  Musculoskeletal: Normal range of motion  She exhibits no edema or deformity     Lymphadenopathy:     She has no cervical adenopathy  Neurological: She is alert and oriented to person, place, and time  Coordination normal    Skin: Skin is warm and dry  No rash noted  She is not diaphoretic  No erythema  Psychiatric: She has a normal mood and affect  Her speech is normal and behavior is normal  Judgment and thought content normal  Cognition and memory are normal    Nursing note and vitals reviewed

## 2018-11-05 ENCOUNTER — TELEPHONE (OUTPATIENT)
Dept: FAMILY MEDICINE CLINIC | Facility: CLINIC | Age: 48
End: 2018-11-05

## 2018-11-06 DIAGNOSIS — H69.83 DYSFUNCTION OF BOTH EUSTACHIAN TUBES: Primary | ICD-10-CM

## 2019-02-08 DIAGNOSIS — Z12.39 SCREENING FOR BREAST CANCER: Primary | ICD-10-CM

## 2019-04-12 ENCOUNTER — OFFICE VISIT (OUTPATIENT)
Dept: FAMILY MEDICINE CLINIC | Facility: CLINIC | Age: 49
End: 2019-04-12
Payer: COMMERCIAL

## 2019-04-12 VITALS
BODY MASS INDEX: 30.15 KG/M2 | SYSTOLIC BLOOD PRESSURE: 100 MMHG | DIASTOLIC BLOOD PRESSURE: 58 MMHG | WEIGHT: 187.6 LBS | HEIGHT: 66 IN

## 2019-04-12 DIAGNOSIS — M25.552 PAIN OF LEFT HIP JOINT: ICD-10-CM

## 2019-04-12 DIAGNOSIS — Z23 ENCOUNTER FOR IMMUNIZATION: ICD-10-CM

## 2019-04-12 DIAGNOSIS — K21.9 GASTROESOPHAGEAL REFLUX DISEASE, ESOPHAGITIS PRESENCE NOT SPECIFIED: ICD-10-CM

## 2019-04-12 DIAGNOSIS — M79.605 LEFT LEG PAIN: Primary | ICD-10-CM

## 2019-04-12 DIAGNOSIS — E03.8 OTHER SPECIFIED HYPOTHYROIDISM: ICD-10-CM

## 2019-04-12 LAB — TSH SERPL DL<=0.05 MIU/L-ACNC: 2.59 UIU/ML (ref 0.36–3.74)

## 2019-04-12 PROCEDURE — 84443 ASSAY THYROID STIM HORMONE: CPT | Performed by: FAMILY MEDICINE

## 2019-04-12 PROCEDURE — 36415 COLL VENOUS BLD VENIPUNCTURE: CPT | Performed by: FAMILY MEDICINE

## 2019-04-12 PROCEDURE — 96372 THER/PROPH/DIAG INJ SC/IM: CPT

## 2019-04-12 PROCEDURE — 99213 OFFICE O/P EST LOW 20 MIN: CPT

## 2019-04-12 RX ORDER — OMEPRAZOLE 20 MG/1
20 CAPSULE, DELAYED RELEASE ORAL DAILY
Qty: 30 CAPSULE | Refills: 1 | Status: SHIPPED | OUTPATIENT
Start: 2019-04-12 | End: 2019-06-24 | Stop reason: SDUPTHER

## 2019-04-12 RX ORDER — LEVOTHYROXINE SODIUM 112 UG/1
112 TABLET ORAL
COMMUNITY
Start: 2019-03-26 | End: 2021-05-18

## 2019-04-12 RX ORDER — METHYLPREDNISOLONE ACETATE 80 MG/ML
80 INJECTION, SUSPENSION INTRA-ARTICULAR; INTRALESIONAL; INTRAMUSCULAR; SOFT TISSUE ONCE
Status: COMPLETED | OUTPATIENT
Start: 2019-04-12 | End: 2019-04-12

## 2019-04-12 RX ORDER — AMOXICILLIN 500 MG/1
500 CAPSULE ORAL
COMMUNITY
End: 2019-05-22

## 2019-04-12 RX ADMIN — METHYLPREDNISOLONE ACETATE 80 MG: 80 INJECTION, SUSPENSION INTRA-ARTICULAR; INTRALESIONAL; INTRAMUSCULAR; SOFT TISSUE at 15:54

## 2019-04-18 ENCOUNTER — TELEPHONE (OUTPATIENT)
Dept: FAMILY MEDICINE CLINIC | Facility: CLINIC | Age: 49
End: 2019-04-18

## 2019-04-29 ENCOUNTER — TRANSCRIBE ORDERS (OUTPATIENT)
Dept: FAMILY MEDICINE CLINIC | Facility: CLINIC | Age: 49
End: 2019-04-29

## 2019-04-29 ENCOUNTER — HOSPITAL ENCOUNTER (OUTPATIENT)
Dept: ULTRASOUND IMAGING | Facility: HOSPITAL | Age: 49
Discharge: HOME | End: 2019-04-29
Payer: COMMERCIAL

## 2019-04-29 ENCOUNTER — TELEPHONE (OUTPATIENT)
Dept: FAMILY MEDICINE CLINIC | Facility: CLINIC | Age: 49
End: 2019-04-29

## 2019-04-29 DIAGNOSIS — R10.11 RUQ PAIN: ICD-10-CM

## 2019-04-29 DIAGNOSIS — R10.11 RUQ PAIN: Primary | ICD-10-CM

## 2019-04-29 PROCEDURE — 76705 ECHO EXAM OF ABDOMEN: CPT

## 2019-05-02 ENCOUNTER — OFFICE VISIT (OUTPATIENT)
Dept: SURGERY | Facility: CLINIC | Age: 49
End: 2019-05-02
Payer: COMMERCIAL

## 2019-05-02 VITALS
WEIGHT: 175 LBS | DIASTOLIC BLOOD PRESSURE: 74 MMHG | HEIGHT: 66 IN | SYSTOLIC BLOOD PRESSURE: 124 MMHG | TEMPERATURE: 97.5 F | HEART RATE: 72 BPM | RESPIRATION RATE: 18 BRPM | BODY MASS INDEX: 28.12 KG/M2

## 2019-05-02 DIAGNOSIS — R10.11 RIGHT UPPER QUADRANT ABDOMINAL PAIN: Primary | ICD-10-CM

## 2019-05-02 PROCEDURE — 99243 OFF/OP CNSLTJ NEW/EST LOW 30: CPT | Performed by: PHYSICIAN ASSISTANT

## 2019-05-02 RX ORDER — PYRIDOXINE HCL (VITAMIN B6) 100 MG
TABLET ORAL
COMMUNITY
Start: 2014-08-11

## 2019-05-09 ENCOUNTER — HOSPITAL ENCOUNTER (OUTPATIENT)
Dept: NUCLEAR MEDICINE | Facility: HOSPITAL | Age: 49
Discharge: HOME | End: 2019-05-09
Payer: COMMERCIAL

## 2019-05-09 DIAGNOSIS — R10.11 RUQ PAIN: ICD-10-CM

## 2019-05-09 PROCEDURE — A9537 TC99M MEBROFENIN: HCPCS

## 2019-05-09 PROCEDURE — 78227 HEPATOBIL SYST IMAGE W/DRUG: CPT

## 2019-05-09 RX ADMIN — SINCALIDE 1.6 MCG: 5 INJECTION, POWDER, LYOPHILIZED, FOR SOLUTION INTRAVENOUS at 13:45

## 2019-05-10 ENCOUNTER — TELEPHONE (OUTPATIENT)
Dept: FAMILY MEDICINE CLINIC | Facility: CLINIC | Age: 49
End: 2019-05-10

## 2019-05-13 ENCOUNTER — OFFICE VISIT (OUTPATIENT)
Dept: SURGERY | Facility: CLINIC | Age: 49
End: 2019-05-13
Payer: COMMERCIAL

## 2019-05-13 VITALS
WEIGHT: 171 LBS | DIASTOLIC BLOOD PRESSURE: 72 MMHG | HEIGHT: 66 IN | HEART RATE: 96 BPM | SYSTOLIC BLOOD PRESSURE: 126 MMHG | TEMPERATURE: 98.3 F | BODY MASS INDEX: 27.48 KG/M2 | RESPIRATION RATE: 18 BRPM

## 2019-05-13 DIAGNOSIS — K81.9 ACALCULOUS CHOLECYSTITIS: Primary | ICD-10-CM

## 2019-05-13 PROCEDURE — 99213 OFFICE O/P EST LOW 20 MIN: CPT | Performed by: PHYSICIAN ASSISTANT

## 2019-05-15 PROBLEM — K81.9 ACALCULOUS CHOLECYSTITIS: Status: ACTIVE | Noted: 2019-05-15

## 2019-05-22 ENCOUNTER — ANESTHESIA EVENT (OUTPATIENT)
Dept: PERIOP | Facility: HOSPITAL | Age: 49
End: 2019-05-22
Payer: COMMERCIAL

## 2019-05-22 RX ORDER — LORATADINE 10 MG/1
10 TABLET ORAL DAILY
COMMUNITY

## 2019-05-23 ENCOUNTER — HOSPITAL ENCOUNTER (OUTPATIENT)
Facility: HOSPITAL | Age: 49
Setting detail: OUTPATIENT SURGERY
Discharge: HOME | End: 2019-05-23
Attending: SURGERY | Admitting: SURGERY
Payer: COMMERCIAL

## 2019-05-23 ENCOUNTER — HOSPITAL ENCOUNTER (OUTPATIENT)
Dept: RADIOLOGY | Facility: HOSPITAL | Age: 49
Setting detail: OUTPATIENT SURGERY
Discharge: HOME | End: 2019-05-23
Payer: COMMERCIAL

## 2019-05-23 ENCOUNTER — ANESTHESIA (OUTPATIENT)
Dept: PERIOP | Facility: HOSPITAL | Age: 49
End: 2019-05-23
Payer: COMMERCIAL

## 2019-05-23 VITALS
WEIGHT: 171 LBS | OXYGEN SATURATION: 96 % | DIASTOLIC BLOOD PRESSURE: 66 MMHG | HEIGHT: 65 IN | RESPIRATION RATE: 20 BRPM | SYSTOLIC BLOOD PRESSURE: 112 MMHG | HEART RATE: 84 BPM | BODY MASS INDEX: 28.49 KG/M2 | TEMPERATURE: 98.1 F

## 2019-05-23 DIAGNOSIS — K81.9 CHOLECYSTITIS, UNSPECIFIED: ICD-10-CM

## 2019-05-23 DIAGNOSIS — K81.9 ACALCULOUS CHOLECYSTITIS: Primary | ICD-10-CM

## 2019-05-23 PROCEDURE — 88304 TISSUE EXAM BY PATHOLOGIST: CPT | Performed by: PATHOLOGY

## 2019-05-23 PROCEDURE — 47563 LAPARO CHOLECYSTECTOMY/GRAPH: CPT | Performed by: PHYSICIAN ASSISTANT

## 2019-05-23 PROCEDURE — 74300 X-RAY BILE DUCTS/PANCREAS: CPT

## 2019-05-23 PROCEDURE — NC001 PR NO CHARGE: Performed by: SURGERY

## 2019-05-23 PROCEDURE — 47563 LAPARO CHOLECYSTECTOMY/GRAPH: CPT | Performed by: SURGERY

## 2019-05-23 RX ORDER — ONDANSETRON 2 MG/ML
INJECTION INTRAMUSCULAR; INTRAVENOUS AS NEEDED
Status: DISCONTINUED | OUTPATIENT
Start: 2019-05-23 | End: 2019-05-23 | Stop reason: SURG

## 2019-05-23 RX ORDER — CEFAZOLIN SODIUM 2 G/50ML
2000 SOLUTION INTRAVENOUS ONCE
Status: COMPLETED | OUTPATIENT
Start: 2019-05-23 | End: 2019-05-23

## 2019-05-23 RX ORDER — DEXAMETHASONE SODIUM PHOSPHATE 10 MG/ML
INJECTION, SOLUTION INTRAMUSCULAR; INTRAVENOUS AS NEEDED
Status: DISCONTINUED | OUTPATIENT
Start: 2019-05-23 | End: 2019-05-23 | Stop reason: SURG

## 2019-05-23 RX ORDER — HYDROMORPHONE HCL/PF 1 MG/ML
1 SYRINGE (ML) INJECTION
Status: DISCONTINUED | OUTPATIENT
Start: 2019-05-23 | End: 2019-05-23 | Stop reason: HOSPADM

## 2019-05-23 RX ORDER — FENTANYL CITRATE 50 UG/ML
INJECTION, SOLUTION INTRAMUSCULAR; INTRAVENOUS AS NEEDED
Status: DISCONTINUED | OUTPATIENT
Start: 2019-05-23 | End: 2019-05-23 | Stop reason: SURG

## 2019-05-23 RX ORDER — LIDOCAINE HYDROCHLORIDE 10 MG/ML
INJECTION, SOLUTION INFILTRATION; PERINEURAL AS NEEDED
Status: DISCONTINUED | OUTPATIENT
Start: 2019-05-23 | End: 2019-05-23 | Stop reason: SURG

## 2019-05-23 RX ORDER — ACETAMINOPHEN 325 MG/1
650 TABLET ORAL EVERY 6 HOURS PRN
Status: DISCONTINUED | OUTPATIENT
Start: 2019-05-23 | End: 2019-05-23 | Stop reason: HOSPADM

## 2019-05-23 RX ORDER — KETOROLAC TROMETHAMINE 30 MG/ML
INJECTION, SOLUTION INTRAMUSCULAR; INTRAVENOUS AS NEEDED
Status: DISCONTINUED | OUTPATIENT
Start: 2019-05-23 | End: 2019-05-23 | Stop reason: SURG

## 2019-05-23 RX ORDER — SODIUM CHLORIDE 9 MG/ML
125 INJECTION, SOLUTION INTRAVENOUS CONTINUOUS
Status: DISCONTINUED | OUTPATIENT
Start: 2019-05-23 | End: 2019-05-23 | Stop reason: SDUPTHER

## 2019-05-23 RX ORDER — ONDANSETRON 4 MG/1
4 TABLET, ORALLY DISINTEGRATING ORAL EVERY 8 HOURS PRN
Status: DISCONTINUED | OUTPATIENT
Start: 2019-05-23 | End: 2019-05-23 | Stop reason: HOSPADM

## 2019-05-23 RX ORDER — SODIUM CHLORIDE 9 MG/ML
INJECTION, SOLUTION INTRAVENOUS AS NEEDED
Status: DISCONTINUED | OUTPATIENT
Start: 2019-05-23 | End: 2019-05-23 | Stop reason: HOSPADM

## 2019-05-23 RX ORDER — PROPOFOL 10 MG/ML
INJECTION, EMULSION INTRAVENOUS AS NEEDED
Status: DISCONTINUED | OUTPATIENT
Start: 2019-05-23 | End: 2019-05-23 | Stop reason: SURG

## 2019-05-23 RX ORDER — GLYCOPYRROLATE 0.2 MG/ML
INJECTION INTRAMUSCULAR; INTRAVENOUS AS NEEDED
Status: DISCONTINUED | OUTPATIENT
Start: 2019-05-23 | End: 2019-05-23 | Stop reason: SURG

## 2019-05-23 RX ORDER — HYDROMORPHONE HCL/PF 1 MG/ML
0.5 SYRINGE (ML) INJECTION
Status: DISCONTINUED | OUTPATIENT
Start: 2019-05-23 | End: 2019-05-23 | Stop reason: HOSPADM

## 2019-05-23 RX ORDER — FENTANYL CITRATE/PF 50 MCG/ML
25 SYRINGE (ML) INJECTION
Status: DISCONTINUED | OUTPATIENT
Start: 2019-05-23 | End: 2019-05-23 | Stop reason: HOSPADM

## 2019-05-23 RX ORDER — MAGNESIUM HYDROXIDE 1200 MG/15ML
LIQUID ORAL AS NEEDED
Status: DISCONTINUED | OUTPATIENT
Start: 2019-05-23 | End: 2019-05-23 | Stop reason: HOSPADM

## 2019-05-23 RX ORDER — DEXTROSE AND SODIUM CHLORIDE 5; .45 G/100ML; G/100ML
80 INJECTION, SOLUTION INTRAVENOUS CONTINUOUS
Status: DISCONTINUED | OUTPATIENT
Start: 2019-05-23 | End: 2019-05-23 | Stop reason: HOSPADM

## 2019-05-23 RX ORDER — ONDANSETRON 2 MG/ML
4 INJECTION INTRAMUSCULAR; INTRAVENOUS EVERY 8 HOURS PRN
Status: DISCONTINUED | OUTPATIENT
Start: 2019-05-23 | End: 2019-05-23 | Stop reason: HOSPADM

## 2019-05-23 RX ORDER — HYDROCODONE BITARTRATE AND ACETAMINOPHEN 5; 325 MG/1; MG/1
1 TABLET ORAL EVERY 4 HOURS PRN
Qty: 10 TABLET | Refills: 0 | Status: SHIPPED | OUTPATIENT
Start: 2019-05-23 | End: 2019-10-04

## 2019-05-23 RX ORDER — MIDAZOLAM HYDROCHLORIDE 1 MG/ML
INJECTION INTRAMUSCULAR; INTRAVENOUS AS NEEDED
Status: DISCONTINUED | OUTPATIENT
Start: 2019-05-23 | End: 2019-05-23 | Stop reason: SURG

## 2019-05-23 RX ORDER — SODIUM CHLORIDE 9 MG/ML
125 INJECTION, SOLUTION INTRAVENOUS CONTINUOUS
Status: DISCONTINUED | OUTPATIENT
Start: 2019-05-23 | End: 2019-05-23 | Stop reason: HOSPADM

## 2019-05-23 RX ORDER — ONDANSETRON 2 MG/ML
4 INJECTION INTRAMUSCULAR; INTRAVENOUS ONCE AS NEEDED
Status: DISCONTINUED | OUTPATIENT
Start: 2019-05-23 | End: 2019-05-23 | Stop reason: HOSPADM

## 2019-05-23 RX ORDER — ONDANSETRON 4 MG/1
4 TABLET, FILM COATED ORAL EVERY 8 HOURS PRN
Qty: 20 TABLET | Refills: 0 | Status: SHIPPED | OUTPATIENT
Start: 2019-05-23 | End: 2019-10-02

## 2019-05-23 RX ORDER — HYDROCODONE BITARTRATE AND ACETAMINOPHEN 5; 325 MG/1; MG/1
1 TABLET ORAL EVERY 4 HOURS PRN
Status: DISCONTINUED | OUTPATIENT
Start: 2019-05-23 | End: 2019-05-23 | Stop reason: HOSPADM

## 2019-05-23 RX ORDER — NEOSTIGMINE METHYLSULFATE 1 MG/ML
INJECTION INTRAVENOUS AS NEEDED
Status: DISCONTINUED | OUTPATIENT
Start: 2019-05-23 | End: 2019-05-23 | Stop reason: SURG

## 2019-05-23 RX ORDER — ROCURONIUM BROMIDE 10 MG/ML
INJECTION, SOLUTION INTRAVENOUS AS NEEDED
Status: DISCONTINUED | OUTPATIENT
Start: 2019-05-23 | End: 2019-05-23 | Stop reason: SURG

## 2019-05-23 RX ORDER — DOCUSATE SODIUM 100 MG/1
100 CAPSULE, LIQUID FILLED ORAL 2 TIMES DAILY
Qty: 60 CAPSULE | Refills: 0 | Status: SHIPPED | OUTPATIENT
Start: 2019-05-23 | End: 2019-10-04

## 2019-05-23 RX ADMIN — SODIUM CHLORIDE: 0.9 INJECTION, SOLUTION INTRAVENOUS at 10:55

## 2019-05-23 RX ADMIN — FENTANYL CITRATE 100 MCG: 50 INJECTION, SOLUTION INTRAMUSCULAR; INTRAVENOUS at 10:10

## 2019-05-23 RX ADMIN — LIDOCAINE HYDROCHLORIDE 50 MG: 10 INJECTION, SOLUTION INFILTRATION; PERINEURAL at 10:10

## 2019-05-23 RX ADMIN — DEXAMETHASONE SODIUM PHOSPHATE 4 MG: 10 INJECTION, SOLUTION INTRAMUSCULAR; INTRAVENOUS at 10:28

## 2019-05-23 RX ADMIN — CEFAZOLIN SODIUM 2000 MG: 2 SOLUTION INTRAVENOUS at 10:05

## 2019-05-23 RX ADMIN — ONDANSETRON 4 MG: 2 INJECTION INTRAMUSCULAR; INTRAVENOUS at 12:34

## 2019-05-23 RX ADMIN — PROPOFOL 200 MG: 10 INJECTION, EMULSION INTRAVENOUS at 10:10

## 2019-05-23 RX ADMIN — FENTANYL CITRATE 25 MCG: 50 INJECTION, SOLUTION INTRAMUSCULAR; INTRAVENOUS at 11:37

## 2019-05-23 RX ADMIN — MIDAZOLAM 2 MG: 1 INJECTION INTRAMUSCULAR; INTRAVENOUS at 10:05

## 2019-05-23 RX ADMIN — ROCURONIUM BROMIDE 50 MG: 10 INJECTION, SOLUTION INTRAVENOUS at 10:10

## 2019-05-23 RX ADMIN — ONDANSETRON HYDROCHLORIDE 4 MG: 2 INJECTION, SOLUTION INTRAVENOUS at 10:28

## 2019-05-23 RX ADMIN — FENTANYL CITRATE 25 MCG: 50 INJECTION, SOLUTION INTRAMUSCULAR; INTRAVENOUS at 11:32

## 2019-05-23 RX ADMIN — FENTANYL CITRATE 50 MCG: 50 INJECTION, SOLUTION INTRAMUSCULAR; INTRAVENOUS at 10:35

## 2019-05-23 RX ADMIN — GLYCOPYRROLATE 0.4 MG: 0.2 INJECTION INTRAMUSCULAR; INTRAVENOUS at 10:54

## 2019-05-23 RX ADMIN — SODIUM CHLORIDE 125 ML/HR: 0.9 INJECTION, SOLUTION INTRAVENOUS at 09:04

## 2019-05-23 RX ADMIN — HYDROMORPHONE HYDROCHLORIDE 0.5 MG: 1 INJECTION, SOLUTION INTRAMUSCULAR; INTRAVENOUS; SUBCUTANEOUS at 11:48

## 2019-05-23 RX ADMIN — NEOSTIGMINE METHYLSULFATE 3 MG: 1 INJECTION, SOLUTION INTRAVENOUS at 10:54

## 2019-05-23 RX ADMIN — PHENYLEPHRINE HYDROCHLORIDE 150 MCG: 10 INJECTION INTRAVENOUS at 10:31

## 2019-05-23 RX ADMIN — KETOROLAC TROMETHAMINE 30 MG: 30 INJECTION, SOLUTION INTRAMUSCULAR at 10:54

## 2019-05-24 ENCOUNTER — TELEPHONE (OUTPATIENT)
Dept: SURGERY | Facility: CLINIC | Age: 49
End: 2019-05-24

## 2019-05-31 ENCOUNTER — OFFICE VISIT (OUTPATIENT)
Dept: URGENT CARE | Age: 49
End: 2019-05-31
Payer: COMMERCIAL

## 2019-05-31 VITALS
WEIGHT: 168.6 LBS | HEIGHT: 65 IN | TEMPERATURE: 97.6 F | OXYGEN SATURATION: 96 % | RESPIRATION RATE: 20 BRPM | BODY MASS INDEX: 28.09 KG/M2

## 2019-05-31 DIAGNOSIS — L50.9 URTICARIA: Primary | ICD-10-CM

## 2019-05-31 PROCEDURE — 99213 OFFICE O/P EST LOW 20 MIN: CPT | Performed by: PHYSICIAN ASSISTANT

## 2019-06-04 ENCOUNTER — OFFICE VISIT (OUTPATIENT)
Dept: SURGERY | Facility: CLINIC | Age: 49
End: 2019-06-04

## 2019-06-04 VITALS
RESPIRATION RATE: 18 BRPM | BODY MASS INDEX: 28.16 KG/M2 | WEIGHT: 169 LBS | HEIGHT: 65 IN | TEMPERATURE: 97.9 F | SYSTOLIC BLOOD PRESSURE: 114 MMHG | DIASTOLIC BLOOD PRESSURE: 68 MMHG | HEART RATE: 113 BPM

## 2019-06-04 DIAGNOSIS — K81.9 CHOLECYSTITIS: Primary | ICD-10-CM

## 2019-06-04 PROCEDURE — 99024 POSTOP FOLLOW-UP VISIT: CPT | Performed by: PHYSICIAN ASSISTANT

## 2019-06-24 DIAGNOSIS — K21.9 GASTROESOPHAGEAL REFLUX DISEASE, ESOPHAGITIS PRESENCE NOT SPECIFIED: ICD-10-CM

## 2019-06-24 RX ORDER — OMEPRAZOLE 20 MG/1
20 CAPSULE, DELAYED RELEASE ORAL DAILY
Qty: 30 CAPSULE | Refills: 1 | Status: SHIPPED | OUTPATIENT
Start: 2019-06-24 | End: 2019-08-26 | Stop reason: SDUPTHER

## 2019-08-12 DIAGNOSIS — Z12.39 SCREENING FOR BREAST CANCER: ICD-10-CM

## 2019-08-26 ENCOUNTER — TELEPHONE (OUTPATIENT)
Dept: FAMILY MEDICINE CLINIC | Facility: CLINIC | Age: 49
End: 2019-08-26

## 2019-08-26 DIAGNOSIS — K21.9 GASTROESOPHAGEAL REFLUX DISEASE, ESOPHAGITIS PRESENCE NOT SPECIFIED: ICD-10-CM

## 2019-08-26 RX ORDER — OMEPRAZOLE 20 MG/1
20 CAPSULE, DELAYED RELEASE ORAL DAILY
Qty: 30 CAPSULE | Refills: 1 | Status: SHIPPED | OUTPATIENT
Start: 2019-08-26 | End: 2019-12-27

## 2019-08-26 NOTE — TELEPHONE ENCOUNTER
Pt called stating that she was prescribed omeprazole by you and was wondering if you though that she should still take this  Pt states that it has been working, she just is out of refills  Pt would like it sent to [facility] pharmacy      CB#: [phone]

## 2019-10-02 ENCOUNTER — OFFICE VISIT (OUTPATIENT)
Dept: FAMILY MEDICINE CLINIC | Facility: CLINIC | Age: 49
End: 2019-10-02
Payer: COMMERCIAL

## 2019-10-02 VITALS
DIASTOLIC BLOOD PRESSURE: 68 MMHG | BODY MASS INDEX: 28.77 KG/M2 | OXYGEN SATURATION: 96 % | HEIGHT: 66 IN | SYSTOLIC BLOOD PRESSURE: 100 MMHG | TEMPERATURE: 97.7 F | WEIGHT: 179 LBS | HEART RATE: 86 BPM

## 2019-10-02 DIAGNOSIS — E55.9 VITAMIN D DEFICIENCY: ICD-10-CM

## 2019-10-02 DIAGNOSIS — J01.00 ACUTE NON-RECURRENT MAXILLARY SINUSITIS: Primary | ICD-10-CM

## 2019-10-02 DIAGNOSIS — E89.0 POSTOPERATIVE HYPOTHYROIDISM: ICD-10-CM

## 2019-10-02 DIAGNOSIS — R73.01 ABNORMAL FASTING GLUCOSE: ICD-10-CM

## 2019-10-02 PROCEDURE — 3008F BODY MASS INDEX DOCD: CPT | Performed by: FAMILY MEDICINE

## 2019-10-02 PROCEDURE — 99213 OFFICE O/P EST LOW 20 MIN: CPT | Performed by: FAMILY MEDICINE

## 2019-10-02 RX ORDER — CEFDINIR 300 MG/1
300 CAPSULE ORAL EVERY 12 HOURS SCHEDULED
Qty: 20 CAPSULE | Refills: 0 | Status: SHIPPED | OUTPATIENT
Start: 2019-10-02 | End: 2019-10-12

## 2019-10-02 NOTE — PROGRESS NOTES
Assessment/Plan:     Treat his likely sinusitis  Prescription for cefdinir sent to pharmacy  Can continue Flonase ocean nasal spray and Mucinex  Recommend recheck yearly with labs  Follows up with [facility] and over regarding her thyroid history  Doing very well since gallbladder surgery  The flu shot last week  Would recommend Pneumovax 23 in 1 month secondary to history of thyroid cancer   Diagnoses and all orders for this visit:    Acute non-recurrent maxillary sinusitis  -     cefdinir (OMNICEF) 300 mg capsule; Take 1 capsule (300 mg total) by mouth every 12 (twelve) hours for 10 days    Postoperative hypothyroidism    Abnormal fasting glucose  -     Lipid panel; Future  -     Comprehensive metabolic panel; Future    Vitamin D deficiency  -     Vitamin D 25 hydroxy; Future        1  Acute non-recurrent maxillary sinusitis  cefdinir (OMNICEF) 300 mg capsule   2  Postoperative hypothyroidism     3  Abnormal fasting glucose  Lipid panel    Comprehensive metabolic panel   4  Vitamin D deficiency  Vitamin D 25 hydroxy       Subjective:        Patient ID: Elder De Leon is a 44 y o  female  Chief Complaint   Patient presents with    Sinusitis     sx for about about over a week, sinus headaches, coughing up green mucus, pt states that she was achey the other day and yesterday she woke up with a sore throat, runny, pt states that she has been getting hot and cold but not really a fever       Patient with sinus like complaints greater than 1 week  Positive pressure and drainage  The following portions of the patient's history were reviewed and updated as appropriate: past medical history, past surgical history and problem list       Review of Systems   Constitutional: Negative for fatigue  HENT: Positive for congestion, rhinorrhea, sinus pressure and sinus pain  Negative for ear pain and sore throat  Respiratory: Negative for cough  Neurological: Negative for headaches  Objective:  /68 (BP Location: Left arm, Patient Position: Sitting, Cuff Size: Standard)   Pulse 86   Temp 97 7 °F (36 5 °C)   Ht 5' 5 5" (1 664 m)   Wt 81 2 kg (179 lb)   SpO2 96%   BMI 29 33 kg/m²        Physical Exam   Constitutional: She appears well-nourished  No distress  HENT:   Head: Atraumatic  Right Ear: External ear normal    Left Ear: External ear normal    Nose: Mucosal edema present  Right sinus exhibits maxillary sinus tenderness  Left sinus exhibits maxillary sinus tenderness  Mouth/Throat: Oropharynx is clear and moist  No oropharyngeal exudate  Eyes: Pupils are equal, round, and reactive to light  Conjunctivae are normal    Cardiovascular: Normal heart sounds  Pulmonary/Chest: Breath sounds normal    Lymphadenopathy:     She has no cervical adenopathy  Neurological: She is alert  Nursing note and vitals reviewed

## 2019-10-03 ENCOUNTER — TELEPHONE (OUTPATIENT)
Dept: FAMILY MEDICINE CLINIC | Facility: CLINIC | Age: 49
End: 2019-10-03

## 2019-10-03 NOTE — TELEPHONE ENCOUNTER
Patient called and stated cefdinir was sent to the pharmacy yesterday for her, asking if she is able to take OTC cough medicine with it

## 2019-10-04 DIAGNOSIS — J01.00 ACUTE NON-RECURRENT MAXILLARY SINUSITIS: Primary | ICD-10-CM

## 2019-10-04 RX ORDER — PREDNISONE 20 MG/1
TABLET ORAL
Qty: 6 TABLET | Refills: 0 | Status: SHIPPED | OUTPATIENT
Start: 2019-10-04 | End: 2019-12-27

## 2019-10-04 NOTE — TELEPHONE ENCOUNTER
Patient called and stated she still is not feeling any better  She still has green mucus, coughing up phelgm, and just feels blah  Wondering what else she can do

## 2019-10-04 NOTE — TELEPHONE ENCOUNTER
I called pt informed her of message and yes please send the prednisone to [facility]

## 2019-10-04 NOTE — TELEPHONE ENCOUNTER
Lot times would all do is I will give her a 3 day course of prednisone to give her mucous system a little bit of a boost   The let me know if she is interested in adding this

## 2019-10-07 ENCOUNTER — TELEPHONE (OUTPATIENT)
Dept: FAMILY MEDICINE CLINIC | Facility: CLINIC | Age: 49
End: 2019-10-07

## 2019-10-07 NOTE — TELEPHONE ENCOUNTER
Patient called and stated she finished the 3 days of prednisone yesterday  States she still has green mucus draining from her nose  States its all sinus'  Shes not sure what to do anymore  The cough has slowed down but the congestion is not getting better

## 2019-10-07 NOTE — TELEPHONE ENCOUNTER
Explain to her that the cefdinir is a strong antibiotic and she has only been on it for 5 days  Other than taking Mucinex or some sort of decongestant she should continue taking the antibiotic    Ask her if the steroids helped if they did a bit then I can give her a longer course of steroids

## 2019-10-08 DIAGNOSIS — J01.00 ACUTE NON-RECURRENT MAXILLARY SINUSITIS: Primary | ICD-10-CM

## 2019-10-08 RX ORDER — PREDNISONE 10 MG/1
TABLET ORAL
Qty: 21 TABLET | Refills: 0 | Status: SHIPPED | OUTPATIENT
Start: 2019-10-08 | End: 2019-12-27

## 2019-11-05 ENCOUNTER — CLINICAL SUPPORT (OUTPATIENT)
Dept: FAMILY MEDICINE CLINIC | Facility: CLINIC | Age: 49
End: 2019-11-05
Payer: COMMERCIAL

## 2019-11-05 DIAGNOSIS — Z23 ENCOUNTER FOR IMMUNIZATION: Primary | ICD-10-CM

## 2019-11-05 PROCEDURE — 90471 IMMUNIZATION ADMIN: CPT | Performed by: FAMILY MEDICINE

## 2019-11-05 PROCEDURE — 90732 PPSV23 VACC 2 YRS+ SUBQ/IM: CPT | Performed by: FAMILY MEDICINE

## 2019-12-27 ENCOUNTER — OFFICE VISIT (OUTPATIENT)
Dept: FAMILY MEDICINE CLINIC | Facility: CLINIC | Age: 49
End: 2019-12-27
Payer: COMMERCIAL

## 2019-12-27 VITALS
DIASTOLIC BLOOD PRESSURE: 70 MMHG | SYSTOLIC BLOOD PRESSURE: 100 MMHG | WEIGHT: 179.6 LBS | TEMPERATURE: 97.7 F | BODY MASS INDEX: 28.87 KG/M2 | OXYGEN SATURATION: 96 % | HEIGHT: 66 IN | HEART RATE: 87 BPM

## 2019-12-27 DIAGNOSIS — R42 VERTIGO: ICD-10-CM

## 2019-12-27 DIAGNOSIS — E89.0 POSTOPERATIVE HYPOTHYROIDISM: Primary | ICD-10-CM

## 2019-12-27 PROCEDURE — 99214 OFFICE O/P EST MOD 30 MIN: CPT | Performed by: FAMILY MEDICINE

## 2019-12-27 PROCEDURE — 3008F BODY MASS INDEX DOCD: CPT | Performed by: FAMILY MEDICINE

## 2019-12-27 RX ORDER — MECLIZINE HCL 12.5 MG/1
12.5 TABLET ORAL EVERY 8 HOURS PRN
Qty: 30 TABLET | Refills: 0 | Status: SHIPPED | OUTPATIENT
Start: 2019-12-27 | End: 2020-11-11

## 2019-12-27 NOTE — LETTER
December 27, 2019     Patient: Jonathan Soto   YOB: 1970   Date of Visit: 12/27/2019       To Whom it May Concern:    Joselito Melani is under my professional care  She was seen in my office on 12/27/2019  She may return to work on 12/30/2019  If you have any questions or concerns, please don't hesitate to call           Sincerely,          Wei Service, DO        CC: No Recipients

## 2019-12-28 RX ORDER — ONDANSETRON 4 MG/1
4 TABLET, ORALLY DISINTEGRATING ORAL EVERY 6 HOURS PRN
Qty: 20 TABLET | Refills: 2 | Status: SHIPPED | OUTPATIENT
Start: 2019-12-28 | End: 2020-09-03

## 2019-12-28 NOTE — PROGRESS NOTES
Assessment/Plan:  Treat his likely benign positional vertigo  Start Zofran which is what they have at home  Add Antivert  Possible VT can be started next week pending progress  Hold prednisone  Any worsening patient should go to the emergency room  Recommend updated TSH is CBC ferritin level  Last TSH was running where it should be for her history of thyroid malignancy  Recent endocrinology note reviewed  Diagnoses and all orders for this visit:    Postoperative hypothyroidism  -     TSH, 3rd generation; Future  -     CBC and differential; Future  -     Ferritin; Future    Vertigo  -     meclizine (ANTIVERT) 12 5 MG tablet; Take 1 tablet (12 5 mg total) by mouth every 8 (eight) hours as needed for dizziness    Other orders  -     Ascorbic Acid (VITAMIN C GUMMIE PO); Take by mouth daily        1  Postoperative hypothyroidism  TSH, 3rd generation    CBC and differential    Ferritin   2  Vertigo  meclizine (ANTIVERT) 12 5 MG tablet       Subjective:        Patient ID: Nella Navarro is a 44 y o  female  Chief Complaint   Patient presents with    Dizziness     this am pt could not stand up she was dizzy, nauseous and head was spinning; over the last month pt states that she has not been feeling right, feet swelling and they feel stiff, this morning pt got up this morning and she was very dizzy, pt states now she feels a little better, but still feels a little dizzy  pt states that she has been getting sinus headaches a lot lately too       Patient with vertigo-like symptoms i e  Positional changes causing dizziness over the last couple of days  Up-to-date with Endocrinology regarding her thyroid cancer  Denies fevers chills      The following portions of the patient's history were reviewed and updated as appropriate: past medical history, past surgical history and problem list       Review of Systems   Constitutional: Negative for appetite change, fatigue, fever and unexpected weight change  HENT: Negative for congestion, ear pain, postnasal drip, rhinorrhea, sinus pressure, sinus pain and sore throat  Eyes: Negative for redness and visual disturbance  Respiratory: Negative for chest tightness and shortness of breath  Cardiovascular: Negative for chest pain, palpitations and leg swelling  Gastrointestinal: Positive for nausea  Negative for abdominal distention, abdominal pain and diarrhea  Endocrine: Negative for cold intolerance and heat intolerance  Genitourinary: Negative for dysuria and hematuria  Musculoskeletal: Negative for arthralgias, gait problem and myalgias  Skin: Negative for pallor and rash  Neurological: Positive for dizziness  Negative for headaches  Psychiatric/Behavioral: Negative for behavioral problems  The patient is not nervous/anxious  Objective:  /70 (BP Location: Left arm, Patient Position: Sitting, Cuff Size: Standard)   Pulse 87   Temp 97 7 °F (36 5 °C)   Ht 5' 6" (1 676 m)   Wt 81 5 kg (179 lb 9 6 oz)   SpO2 96%   BMI 28 99 kg/m²        Physical Exam   Constitutional: She is oriented to person, place, and time  She appears well-nourished  No distress  HENT:   Head: Normocephalic and atraumatic  Right Ear: Tympanic membrane normal    Left Ear: Tympanic membrane normal    Mouth/Throat: Oropharynx is clear and moist    Eyes: Pupils are equal, round, and reactive to light  Conjunctivae and EOM are normal  No scleral icterus  Neck: Normal range of motion  Neck supple  No thyromegaly present  Cardiovascular: Normal rate, regular rhythm, normal heart sounds and intact distal pulses  No murmur heard  Pulses:       Carotid pulses are 0 on the right side, and 0 on the left side  Pulmonary/Chest: Effort normal and breath sounds normal  No respiratory distress  She has no wheezes  Abdominal: Soft  She exhibits no distension  Musculoskeletal: She exhibits no edema  Lymphadenopathy:     She has no cervical adenopathy  Neurological: She is alert and oriented to person, place, and time  She has normal reflexes  No cranial nerve deficit  Romberg slightly off   Dizziness can be reproduced with positional change   Skin: Skin is warm  No pallor  Psychiatric: She has a normal mood and affect  Her behavior is normal  Judgment and thought content normal    Nursing note and vitals reviewed

## 2019-12-30 ENCOUNTER — APPOINTMENT (OUTPATIENT)
Dept: LAB | Facility: MEDICAL CENTER | Age: 49
End: 2019-12-30
Payer: COMMERCIAL

## 2019-12-30 DIAGNOSIS — R73.01 ABNORMAL FASTING GLUCOSE: ICD-10-CM

## 2019-12-30 DIAGNOSIS — E55.9 VITAMIN D DEFICIENCY: ICD-10-CM

## 2019-12-30 DIAGNOSIS — E89.0 POSTOPERATIVE HYPOTHYROIDISM: ICD-10-CM

## 2019-12-30 LAB
25(OH)D3 SERPL-MCNC: 18.4 NG/ML (ref 30–100)
ALBUMIN SERPL BCP-MCNC: 4 G/DL (ref 3.5–5)
ALP SERPL-CCNC: 79 U/L (ref 46–116)
ALT SERPL W P-5'-P-CCNC: 66 U/L (ref 12–78)
ANION GAP SERPL CALCULATED.3IONS-SCNC: 5 MMOL/L (ref 4–13)
AST SERPL W P-5'-P-CCNC: 28 U/L (ref 5–45)
BASOPHILS # BLD AUTO: 0.04 THOUSANDS/ΜL (ref 0–0.1)
BASOPHILS NFR BLD AUTO: 1 % (ref 0–1)
BILIRUB SERPL-MCNC: 0.41 MG/DL (ref 0.2–1)
BUN SERPL-MCNC: 14 MG/DL (ref 5–25)
CALCIUM SERPL-MCNC: 9.5 MG/DL (ref 8.3–10.1)
CHLORIDE SERPL-SCNC: 108 MMOL/L (ref 100–108)
CHOLEST SERPL-MCNC: 217 MG/DL (ref 50–200)
CO2 SERPL-SCNC: 29 MMOL/L (ref 21–32)
CREAT SERPL-MCNC: 0.84 MG/DL (ref 0.6–1.3)
EOSINOPHIL # BLD AUTO: 0.18 THOUSAND/ΜL (ref 0–0.61)
EOSINOPHIL NFR BLD AUTO: 3 % (ref 0–6)
ERYTHROCYTE [DISTWIDTH] IN BLOOD BY AUTOMATED COUNT: 12.5 % (ref 11.6–15.1)
FERRITIN SERPL-MCNC: 297 NG/ML (ref 8–388)
GFR SERPL CREATININE-BSD FRML MDRD: 81 ML/MIN/1.73SQ M
GLUCOSE P FAST SERPL-MCNC: 94 MG/DL (ref 65–99)
HCT VFR BLD AUTO: 47.7 % (ref 34.8–46.1)
HDLC SERPL-MCNC: 44 MG/DL
HGB BLD-MCNC: 15.2 G/DL (ref 11.5–15.4)
IMM GRANULOCYTES # BLD AUTO: 0.01 THOUSAND/UL (ref 0–0.2)
IMM GRANULOCYTES NFR BLD AUTO: 0 % (ref 0–2)
LDLC SERPL CALC-MCNC: 139 MG/DL (ref 0–100)
LYMPHOCYTES # BLD AUTO: 2.12 THOUSANDS/ΜL (ref 0.6–4.47)
LYMPHOCYTES NFR BLD AUTO: 33 % (ref 14–44)
MCH RBC QN AUTO: 30.1 PG (ref 26.8–34.3)
MCHC RBC AUTO-ENTMCNC: 31.9 G/DL (ref 31.4–37.4)
MCV RBC AUTO: 95 FL (ref 82–98)
MONOCYTES # BLD AUTO: 0.43 THOUSAND/ΜL (ref 0.17–1.22)
MONOCYTES NFR BLD AUTO: 7 % (ref 4–12)
NEUTROPHILS # BLD AUTO: 3.6 THOUSANDS/ΜL (ref 1.85–7.62)
NEUTS SEG NFR BLD AUTO: 56 % (ref 43–75)
NONHDLC SERPL-MCNC: 173 MG/DL
NRBC BLD AUTO-RTO: 0 /100 WBCS
PLATELET # BLD AUTO: 259 THOUSANDS/UL (ref 149–390)
PMV BLD AUTO: 11.1 FL (ref 8.9–12.7)
POTASSIUM SERPL-SCNC: 4.6 MMOL/L (ref 3.5–5.3)
PROT SERPL-MCNC: 7.6 G/DL (ref 6.4–8.2)
RBC # BLD AUTO: 5.05 MILLION/UL (ref 3.81–5.12)
SODIUM SERPL-SCNC: 142 MMOL/L (ref 136–145)
TRIGL SERPL-MCNC: 171 MG/DL
TSH SERPL DL<=0.05 MIU/L-ACNC: 0.13 UIU/ML (ref 0.36–3.74)
WBC # BLD AUTO: 6.38 THOUSAND/UL (ref 4.31–10.16)

## 2019-12-30 PROCEDURE — 82306 VITAMIN D 25 HYDROXY: CPT

## 2019-12-30 PROCEDURE — 82728 ASSAY OF FERRITIN: CPT

## 2019-12-30 PROCEDURE — 85025 COMPLETE CBC W/AUTO DIFF WBC: CPT

## 2019-12-30 PROCEDURE — 80061 LIPID PANEL: CPT

## 2019-12-30 PROCEDURE — 80053 COMPREHEN METABOLIC PANEL: CPT

## 2019-12-30 PROCEDURE — 36415 COLL VENOUS BLD VENIPUNCTURE: CPT

## 2019-12-30 PROCEDURE — 84443 ASSAY THYROID STIM HORMONE: CPT

## 2019-12-31 DIAGNOSIS — R42 VERTIGO: Primary | ICD-10-CM

## 2019-12-31 RX ORDER — PREDNISONE 10 MG/1
TABLET ORAL
Qty: 21 TABLET | Refills: 0 | Status: SHIPPED | OUTPATIENT
Start: 2019-12-31 | End: 2020-09-03

## 2020-01-06 ENCOUNTER — OFFICE VISIT (OUTPATIENT)
Dept: FAMILY MEDICINE CLINIC | Facility: CLINIC | Age: 50
End: 2020-01-06
Payer: COMMERCIAL

## 2020-01-06 VITALS
HEART RATE: 69 BPM | TEMPERATURE: 98.2 F | HEIGHT: 66 IN | WEIGHT: 183.8 LBS | OXYGEN SATURATION: 98 % | BODY MASS INDEX: 29.54 KG/M2 | SYSTOLIC BLOOD PRESSURE: 100 MMHG | DIASTOLIC BLOOD PRESSURE: 70 MMHG

## 2020-01-06 DIAGNOSIS — E55.9 VITAMIN D DEFICIENCY: ICD-10-CM

## 2020-01-06 DIAGNOSIS — E78.2 MIXED HYPERLIPIDEMIA: ICD-10-CM

## 2020-01-06 DIAGNOSIS — E89.0 POSTOPERATIVE HYPOTHYROIDISM: ICD-10-CM

## 2020-01-06 DIAGNOSIS — Z85.850 HISTORY OF PAPILLARY ADENOCARCINOMA OF THYROID: ICD-10-CM

## 2020-01-06 DIAGNOSIS — H81.13 BENIGN PAROXYSMAL VERTIGO OF BOTH EARS: Primary | ICD-10-CM

## 2020-01-06 PROCEDURE — 99214 OFFICE O/P EST MOD 30 MIN: CPT | Performed by: FAMILY MEDICINE

## 2020-01-06 PROCEDURE — 1036F TOBACCO NON-USER: CPT | Performed by: FAMILY MEDICINE

## 2020-01-06 PROCEDURE — 3008F BODY MASS INDEX DOCD: CPT | Performed by: FAMILY MEDICINE

## 2020-01-06 NOTE — PROGRESS NOTES
Assessment/Plan:    Vertigo symptoms are almost resolved at this point  Will patient has 2 more days of prednisone to complete  Will continue to move slowly  Labs reviewed in full  TSH in good range based on history of thyroid papillary carcinoma  Patient's cholesterol just slightly out of range  Recommend a thing more than dietary modification  Patient just joined a gym  Recommend increased vitamin-D up to 5000 units daily  Mammography up-to-date  Colonoscopy up-to-date  Diagnoses and all orders for this visit:    Benign paroxysmal vertigo of both ears    Postoperative hypothyroidism  -     TSH, 3rd generation; Future    Vitamin D deficiency  -     Vitamin D 25 hydroxy; Future    Mixed hyperlipidemia  -     Comprehensive metabolic panel; Future  -     Lipid panel; Future    History of papillary adenocarcinoma of thyroid        1  Benign paroxysmal vertigo of both ears     2  Postoperative hypothyroidism  TSH, 3rd generation   3  Vitamin D deficiency  Vitamin D 25 hydroxy   4  Mixed hyperlipidemia  Comprehensive metabolic panel    Lipid panel   5  History of papillary adenocarcinoma of thyroid         Subjective:        Patient ID: Jillian Boudreaux is a 44 y o  female  Chief Complaint   Patient presents with    Follow-up     pt states that she is feeling a little better than last week, pt states that when she looks down and up real fast she does get a little dizzy, pt states that she does not have the leg swelling , feet do still feel tight       Patient here for re-evaluation  Seen last week for vertigo  Was eventually given prednisone on top of her meclizine  Is getting better but still having some slight symptoms  Has been able to get back to work        The following portions of the patient's history were reviewed and updated as appropriate: past medical history, past surgical history and problem list       Review of Systems   Constitutional: Negative for appetite change, fatigue, fever and unexpected weight change  HENT: Negative for congestion, ear pain, postnasal drip, rhinorrhea, sinus pressure, sinus pain and sore throat  Eyes: Negative for redness and visual disturbance  Respiratory: Negative for chest tightness and shortness of breath  Cardiovascular: Negative for chest pain, palpitations and leg swelling  Gastrointestinal: Negative for abdominal distention, abdominal pain, diarrhea and nausea  Endocrine: Negative for cold intolerance and heat intolerance  Genitourinary: Negative for dysuria and hematuria  Musculoskeletal: Negative for arthralgias, gait problem and myalgias  Skin: Negative for pallor and rash  Neurological: Negative for headaches  Vertigo symptoms improved greater than 80%   Psychiatric/Behavioral: Negative for behavioral problems  The patient is not nervous/anxious  Objective:  /70 (BP Location: Left arm, Patient Position: Sitting, Cuff Size: Standard)   Pulse 69   Temp 98 2 °F (36 8 °C)   Ht 5' 5 5" (1 664 m)   Wt 83 4 kg (183 lb 12 8 oz)   SpO2 98%   BMI 30 12 kg/m²        Physical Exam   Constitutional: She is oriented to person, place, and time  She appears well-nourished  No distress  HENT:   Head: Normocephalic and atraumatic  Right Ear: Tympanic membrane normal    Left Ear: Tympanic membrane normal    Mouth/Throat: Oropharynx is clear and moist    Eyes: Pupils are equal, round, and reactive to light  Conjunctivae and EOM are normal  No scleral icterus  Neck: Normal range of motion  Neck supple  No thyromegaly present  Cardiovascular: Normal rate, regular rhythm, normal heart sounds and intact distal pulses  No murmur heard  Pulses:       Carotid pulses are 0 on the right side, and 0 on the left side  Pulmonary/Chest: Effort normal and breath sounds normal  No respiratory distress  She has no wheezes  Abdominal: Soft  She exhibits no distension  Musculoskeletal: She exhibits no edema     Lymphadenopathy: She has no cervical adenopathy  Neurological: She is alert and oriented to person, place, and time  She has normal reflexes  No cranial nerve deficit  Skin: Skin is warm  No pallor  Psychiatric: She has a normal mood and affect  Her behavior is normal  Judgment and thought content normal    Nursing note and vitals reviewed  BMI Counseling: Body mass index is 30 12 kg/m²  The BMI is above normal  Nutrition recommendations include reducing portion sizes, decreasing overall calorie intake, 3-5 servings of fruits/vegetables daily, reducing fast food intake, consuming healthier snacks, decreasing soda and/or juice intake and moderation in carbohydrate intake  Exercise recommendations include exercising 3-5 times per week

## 2020-01-06 NOTE — PATIENT INSTRUCTIONS

## 2020-01-13 DIAGNOSIS — B00.1 COLD SORE: Primary | ICD-10-CM

## 2020-01-14 RX ORDER — VALACYCLOVIR HYDROCHLORIDE 1 G/1
1000 TABLET, FILM COATED ORAL 2 TIMES DAILY
Qty: 4 TABLET | Refills: 1 | Status: SHIPPED | OUTPATIENT
Start: 2020-01-14 | End: 2020-09-29 | Stop reason: SDUPTHER

## 2020-03-05 ENCOUNTER — OFFICE VISIT (OUTPATIENT)
Dept: FAMILY MEDICINE CLINIC | Facility: CLINIC | Age: 50
End: 2020-03-05
Payer: COMMERCIAL

## 2020-03-05 DIAGNOSIS — M79.605 BILATERAL LEG PAIN: ICD-10-CM

## 2020-03-05 DIAGNOSIS — R60.0 BILATERAL LOWER EXTREMITY EDEMA: Primary | ICD-10-CM

## 2020-03-05 DIAGNOSIS — M79.604 BILATERAL LEG PAIN: ICD-10-CM

## 2020-03-05 PROCEDURE — 1036F TOBACCO NON-USER: CPT | Performed by: NURSE PRACTITIONER

## 2020-03-05 PROCEDURE — 99213 OFFICE O/P EST LOW 20 MIN: CPT | Performed by: NURSE PRACTITIONER

## 2020-03-06 VITALS
OXYGEN SATURATION: 97 % | HEART RATE: 81 BPM | TEMPERATURE: 97.5 F | HEIGHT: 66 IN | WEIGHT: 188 LBS | BODY MASS INDEX: 30.22 KG/M2 | RESPIRATION RATE: 16 BRPM | SYSTOLIC BLOOD PRESSURE: 114 MMHG | DIASTOLIC BLOOD PRESSURE: 90 MMHG

## 2020-03-06 NOTE — PROGRESS NOTES
Assessment/Plan:    Leg Pain/ Swelling  Bilateral, no evidence of DVT on exam, no areas of warmth/redness/ pain  Will get venous duplex, CMP and BMP  Advised to use compression stockings during the day, low sodium diet, keep elevating feet and exercise  Will call with testing results  Please call the office if you are experiencing any worsening of symptoms or no symptom improvement  Patient verbalizes understand and agrees with treatment plan  Diagnoses and all orders for this visit:    Bilateral lower extremity edema  -     NT-BNP PRO; Future  -     Comprehensive metabolic panel; Future  -     VAS lower limb venous duplex study, complete bilateral; Future    Bilateral leg pain  -     NT-BNP PRO; Future  -     Comprehensive metabolic panel; Future  -     VAS lower limb venous duplex study, complete bilateral; Future                Subjective:        Patient ID: Alessio Mayo is a 44 y o  female  Chief Complaint   Patient presents with    Leg Swelling     both ankles; pressure in feet       Here for evaluation of lower extremity edema  This is been intermittent for about 1 year  But the past month or so she has noticed that her feet are now painful in the swelling is little bit worse  She states that her feet feel tight  Usually the left is worse than the right but today the right swelling is worse than the left  She states her legs look fine in the morning but they are sore  Her blood pressure has been running fine  She has been elevating feet at work which has been helpful  To she has some soreness with shoes on as they feel too tight  Patient denies any dietary changes or any changes in sodium  Denies any chest pain or shortness of breath or any weight changes  Patient states that starting in January she did increase her exercising  Denies any areas of redness/ heat         The following portions of the patient's history were reviewed and updated as appropriate: allergies, current medications, past family history, past social history and problem list     Review of Systems   Constitutional: Negative for chills and fever  Eyes: Negative for discharge  Respiratory: Negative for shortness of breath  Cardiovascular: Positive for leg swelling  Negative for chest pain  Gastrointestinal: Negative for constipation and diarrhea  Genitourinary: Negative for difficulty urinating  Musculoskeletal: Negative for joint swelling  Skin: Negative for rash  Neurological: Negative for headaches  Hematological: Negative for adenopathy  Psychiatric/Behavioral: The patient is not nervous/anxious  Objective:  /90 (BP Location: Left arm, Patient Position: Sitting, Cuff Size: Standard)   Pulse 81   Temp 97 5 °F (36 4 °C) (Temporal)   Resp 16   Ht 5' 5 5" (1 664 m)   Wt 85 3 kg (188 lb)   SpO2 97%   BMI 30 81 kg/m²      Physical Exam   Constitutional: She is oriented to person, place, and time  She appears well-developed  No distress  obese   HENT:   Head: Normocephalic and atraumatic  Right Ear: External ear normal    Left Ear: External ear normal    Eyes: Conjunctivae and lids are normal  Right eye exhibits no discharge  Left eye exhibits no discharge  Neck: Neck supple  Cardiovascular: Normal rate and regular rhythm  No murmur heard  Pulses:       Dorsalis pedis pulses are 2+ on the right side, and 2+ on the left side  Pulmonary/Chest: Effort normal and breath sounds normal  No respiratory distress  She has no wheezes  Musculoskeletal: She exhibits edema (+1 lower extremity pitting edema bilateral ankles/feet  no areas of warmth/heat/ redness)  She exhibits no deformity  Neurological: She is alert and oriented to person, place, and time  Skin: Skin is warm and dry  She is not diaphoretic  Psychiatric: She has a normal mood and affect   Her speech is normal and behavior is normal  Judgment and thought content normal  Cognition and memory are normal  Nursing note and vitals reviewed               Current Outpatient Medications:     Ascorbic Acid (VITAMIN C GUMMIE PO), Take by mouth daily, Disp: , Rfl:     Calcium Citrate 200 MG TABS, One tablet daily, Disp: , Rfl:     cholecalciferol (VITAMIN D3) 1,000 units tablet, Take 2,000 Units by mouth daily , Disp: , Rfl:     Cyanocobalamin ER (HM VITAMIN B12) 1000 MCG TBCR, Take 1 tablet by mouth daily , Disp: , Rfl:     diphenhydrAMINE HCl (BENADRYL ALLERGY PO), Take by mouth, Disp: , Rfl:     Flaxseed Oil OIL, Take 1 capsule by mouth daily , Disp: , Rfl:     Glucos-Chondroit-Collag-Hyal (GLUCOSAMINE CHONDROIT-COLLAGEN PO), One tablet daily, Disp: , Rfl:     levothyroxine 112 mcg tablet, Take 112 mcg by mouth , Disp: , Rfl:     loratadine (CLARITIN) 10 mg tablet, Take 10 mg by mouth daily, Disp: , Rfl:     Lysine 1000 MG TABS, Take 1,000 mg by mouth daily , Disp: , Rfl:     Magnesium 400 MG TABS, Take 1 tablet by mouth daily , Disp: , Rfl:     meclizine (ANTIVERT) 12 5 MG tablet, Take 1 tablet (12 5 mg total) by mouth every 8 (eight) hours as needed for dizziness, Disp: 30 tablet, Rfl: 0    Multiple Vitamin (DAILY VITAMINS PO), Take 1 tablet by mouth daily, Disp: , Rfl:     OMEGA-3 FATTY ACIDS-VITAMIN E PO, Take 1,000 mg by mouth daily , Disp: , Rfl:     ondansetron (ZOFRAN-ODT) 4 mg disintegrating tablet, Take 1 tablet (4 mg total) by mouth every 6 (six) hours as needed for nausea or vomiting, Disp: 20 tablet, Rfl: 2    predniSONE 10 mg tablet, 6 tabs Day 1, 5 tabs Day 2, 4 tabs Day 3, 3 tabs Day 4, 2 tabs Day 5, 1 tab Day 6 , Disp: 21 tablet, Rfl: 0    pyridoxine (B-6) 100 MG tablet, One tablet daily, Disp: , Rfl:     Turmeric 500 MG CAPS, Take 1 tablet by mouth daily , Disp: , Rfl:     valACYclovir (VALTREX) 1,000 mg tablet, Take 1 tablet (1,000 mg total) by mouth 2 (two) times a day for 1 day, Disp: 4 tablet, Rfl: 1  Allergies   Allergen Reactions    Bee Venom Anaphylaxis    Dust Mite Extract  Gabapentin Dizziness     Annotation - [id number]: "felt like out of body experience"    Molds & Smuts Sneezing    Other      Annotation - [id number]: seasonal, Trees    Pollen Extract     Bernard Grass Pollen Allergen     Latex Rash

## 2020-03-07 ENCOUNTER — APPOINTMENT (OUTPATIENT)
Dept: LAB | Age: 50
End: 2020-03-07
Payer: COMMERCIAL

## 2020-03-07 DIAGNOSIS — E89.0 POSTOPERATIVE HYPOTHYROIDISM: ICD-10-CM

## 2020-03-07 DIAGNOSIS — E78.2 MIXED HYPERLIPIDEMIA: ICD-10-CM

## 2020-03-07 DIAGNOSIS — M79.605 BILATERAL LEG PAIN: ICD-10-CM

## 2020-03-07 DIAGNOSIS — R60.0 BILATERAL LOWER EXTREMITY EDEMA: ICD-10-CM

## 2020-03-07 DIAGNOSIS — M79.604 BILATERAL LEG PAIN: ICD-10-CM

## 2020-03-07 DIAGNOSIS — E55.9 VITAMIN D DEFICIENCY: ICD-10-CM

## 2020-03-07 LAB
25(OH)D3 SERPL-MCNC: 29.2 NG/ML (ref 30–100)
ALBUMIN SERPL BCP-MCNC: 3.8 G/DL (ref 3.5–5)
ALP SERPL-CCNC: 79 U/L (ref 46–116)
ALT SERPL W P-5'-P-CCNC: 43 U/L (ref 12–78)
ANION GAP SERPL CALCULATED.3IONS-SCNC: 6 MMOL/L (ref 4–13)
AST SERPL W P-5'-P-CCNC: 19 U/L (ref 5–45)
BILIRUB SERPL-MCNC: 0.58 MG/DL (ref 0.2–1)
BUN SERPL-MCNC: 14 MG/DL (ref 5–25)
CALCIUM SERPL-MCNC: 9.3 MG/DL (ref 8.3–10.1)
CHLORIDE SERPL-SCNC: 108 MMOL/L (ref 100–108)
CHOLEST SERPL-MCNC: 194 MG/DL (ref 50–200)
CO2 SERPL-SCNC: 29 MMOL/L (ref 21–32)
CREAT SERPL-MCNC: 0.73 MG/DL (ref 0.6–1.3)
GFR SERPL CREATININE-BSD FRML MDRD: 96 ML/MIN/1.73SQ M
GLUCOSE P FAST SERPL-MCNC: 85 MG/DL (ref 65–99)
HDLC SERPL-MCNC: 41 MG/DL
LDLC SERPL CALC-MCNC: 135 MG/DL (ref 0–100)
NONHDLC SERPL-MCNC: 153 MG/DL
NT-PROBNP SERPL-MCNC: 64 PG/ML
POTASSIUM SERPL-SCNC: 5.2 MMOL/L (ref 3.5–5.3)
PROT SERPL-MCNC: 7.1 G/DL (ref 6.4–8.2)
SODIUM SERPL-SCNC: 143 MMOL/L (ref 136–145)
TRIGL SERPL-MCNC: 90 MG/DL
TSH SERPL DL<=0.05 MIU/L-ACNC: 0.24 UIU/ML (ref 0.36–3.74)

## 2020-03-07 PROCEDURE — 83880 ASSAY OF NATRIURETIC PEPTIDE: CPT

## 2020-03-07 PROCEDURE — 82306 VITAMIN D 25 HYDROXY: CPT

## 2020-03-07 PROCEDURE — 80053 COMPREHEN METABOLIC PANEL: CPT

## 2020-03-07 PROCEDURE — 80061 LIPID PANEL: CPT

## 2020-03-07 PROCEDURE — 36415 COLL VENOUS BLD VENIPUNCTURE: CPT

## 2020-03-07 PROCEDURE — 84443 ASSAY THYROID STIM HORMONE: CPT

## 2020-03-09 ENCOUNTER — TELEPHONE (OUTPATIENT)
Dept: FAMILY MEDICINE CLINIC | Facility: CLINIC | Age: 50
End: 2020-03-09

## 2020-03-09 NOTE — TELEPHONE ENCOUNTER
----- Message from [address] sent at 3/6/2020 10:33 PM EST -----  Please call to notify patient that [address] and lab orders placed to be completed

## 2020-03-09 NOTE — TELEPHONE ENCOUNTER
BNP was normal- no signs of fluid overload  CMP normal  Cholesterol a little elevated but okay    TSH shows shes on too much medication, this needs to be followed with endo  Sometimes with her history they want it to be that suppressed       Vitamin d low-start 2000IU daily    Continue with plan to get US

## 2020-03-25 ENCOUNTER — HOSPITAL ENCOUNTER (OUTPATIENT)
Dept: NON INVASIVE DIAGNOSTICS | Facility: CLINIC | Age: 50
Discharge: HOME | End: 2020-03-25
Payer: COMMERCIAL

## 2020-03-25 DIAGNOSIS — R60.0 BILATERAL LOWER EXTREMITY EDEMA: ICD-10-CM

## 2020-03-25 DIAGNOSIS — M79.605 BILATERAL LEG PAIN: ICD-10-CM

## 2020-03-25 DIAGNOSIS — M79.604 BILATERAL LEG PAIN: ICD-10-CM

## 2020-03-25 PROCEDURE — 93970 EXTREMITY STUDY: CPT | Performed by: SURGERY

## 2020-03-25 PROCEDURE — 93970 EXTREMITY STUDY: CPT

## 2020-03-31 DIAGNOSIS — M79.605 PAIN IN BOTH LOWER EXTREMITIES: Primary | ICD-10-CM

## 2020-03-31 DIAGNOSIS — M79.604 PAIN IN BOTH LOWER EXTREMITIES: Primary | ICD-10-CM

## 2020-07-20 ENCOUNTER — OFFICE VISIT (OUTPATIENT)
Dept: FAMILY MEDICINE CLINIC | Facility: CLINIC | Age: 50
End: 2020-07-20
Payer: COMMERCIAL

## 2020-07-20 VITALS
WEIGHT: 186.6 LBS | OXYGEN SATURATION: 97 % | HEART RATE: 87 BPM | DIASTOLIC BLOOD PRESSURE: 70 MMHG | BODY MASS INDEX: 31.09 KG/M2 | HEIGHT: 65 IN | TEMPERATURE: 97.7 F | SYSTOLIC BLOOD PRESSURE: 100 MMHG

## 2020-07-20 DIAGNOSIS — M25.50 CHRONIC PAIN OF MULTIPLE JOINTS: ICD-10-CM

## 2020-07-20 DIAGNOSIS — G89.29 CHRONIC MIDLINE LOW BACK PAIN WITHOUT SCIATICA: Primary | ICD-10-CM

## 2020-07-20 DIAGNOSIS — M25.561 CHRONIC PAIN OF RIGHT KNEE: ICD-10-CM

## 2020-07-20 DIAGNOSIS — G89.29 CHRONIC PAIN OF RIGHT KNEE: ICD-10-CM

## 2020-07-20 DIAGNOSIS — M25.552 BILATERAL HIP PAIN: ICD-10-CM

## 2020-07-20 DIAGNOSIS — M25.551 BILATERAL HIP PAIN: ICD-10-CM

## 2020-07-20 DIAGNOSIS — G89.29 CHRONIC PAIN OF MULTIPLE JOINTS: ICD-10-CM

## 2020-07-20 DIAGNOSIS — M54.50 CHRONIC MIDLINE LOW BACK PAIN WITHOUT SCIATICA: Primary | ICD-10-CM

## 2020-07-20 PROCEDURE — 3008F BODY MASS INDEX DOCD: CPT | Performed by: NURSE PRACTITIONER

## 2020-07-20 PROCEDURE — 1036F TOBACCO NON-USER: CPT | Performed by: NURSE PRACTITIONER

## 2020-07-20 PROCEDURE — 99213 OFFICE O/P EST LOW 20 MIN: CPT | Performed by: NURSE PRACTITIONER

## 2020-07-20 NOTE — PROGRESS NOTES
Assessment/Plan:    Chronic Lower Back Pain/ Pain of Multiple Joints  Will complete blood work to rule out Lyme as well as possible autoimmune causes  Right hip pain appears to be more related to right lower back / right SI pain  Will start with PT as well  May require imaging/ referral pending how she does with PT as well as lab work  Please call the office if you are experiencing any worsening of symptoms or no symptom improvement  Patient verbalizes understand and agrees with treatment plan  Diagnoses and all orders for this visit:    Chronic midline low back pain without sciatica  -     Lyme Antibody Profile with reflex to WB; Future  -     Ambulatory referral to Physical Therapy; Future    Bilateral hip pain  -     Lyme Antibody Profile with reflex to WB; Future  -     Ambulatory referral to Physical Therapy; Future    Chronic pain of right knee  -     Lyme Antibody Profile with reflex to WB; Future  -     Ambulatory referral to Physical Therapy; Future    Chronic pain of multiple joints  -     Lyme Antibody Profile with reflex to WB; Future  -     C-reactive protein; Future  -     ZORAIDA Screen w/ Reflex to Titer/Pattern; Future  -     RF Screen w/ Reflex to Titer; Future  -     Cyclic citrul peptide antibody, IgG; Future  -     Ambulatory referral to Physical Therapy; Future                Subjective:        Patient ID: Viviane Amaya is a 44 y o  female  Chief Complaint   Patient presents with    Leg Pain     bilateral leg pain; back pain, knee pain  pt states she was here a little bit ago and is still having the leg pain, pt states that she is having R knee pain, and now pain in the hips  pt states that she was in a lot of pain Sat  pt states that pain is worser when she sits       Here for pain of multiple sites  This has been happening since the beginning of the year  She's been exercising which hasn't helped  She was having swelling which has resolved   This is more right sided/ knee and hip and lower back  Once she's up and moving around more it's better  She states it takes 1 hour to loosen up her joints  The following portions of the patient's history were reviewed and updated as appropriate: allergies, current medications, past family history, past social history and problem list     Review of Systems   Constitutional: Negative for chills and fever  Eyes: Negative for discharge  Respiratory: Negative for shortness of breath  Cardiovascular: Negative for chest pain  Gastrointestinal: Negative for constipation and diarrhea  Genitourinary: Negative for difficulty urinating  Musculoskeletal: Positive for arthralgias and back pain  Negative for joint swelling  Skin: Negative for rash  Neurological: Negative for headaches  Hematological: Negative for adenopathy  Psychiatric/Behavioral: The patient is not nervous/anxious  Objective:  /70 (BP Location: Left arm, Patient Position: Sitting, Cuff Size: Standard)   Pulse 87   Temp 97 7 °F (36 5 °C)   Ht 5' 5" (1 651 m)   Wt 84 6 kg (186 lb 9 6 oz)   SpO2 97%   BMI 31 05 kg/m²      Physical Exam   Constitutional: She is oriented to person, place, and time  She appears well-developed  No distress  HENT:   Head: Normocephalic and atraumatic  Right Ear: External ear normal    Left Ear: External ear normal    Eyes: Conjunctivae and lids are normal  Right eye exhibits no discharge  Left eye exhibits no discharge  Neck: Neck supple  Cardiovascular: Normal rate and regular rhythm  No murmur heard  Pulmonary/Chest: Effort normal and breath sounds normal  No respiratory distress  She has no wheezes  Musculoskeletal: She exhibits tenderness  She exhibits no deformity  Right hip: She exhibits no tenderness  Right knee: She exhibits normal range of motion, no swelling and no effusion  No tenderness found  Left knee: She exhibits normal range of motion, no swelling and no effusion   No tenderness found         Lumbar back: She exhibits tenderness  Palpable tenderness middle lower back that radiates into right hip/right SI joint    Strength bilateral lower legs +5, crepitus present in right knee   Neurological: She is alert and oriented to person, place, and time  Skin: Skin is warm and dry  She is not diaphoretic  Psychiatric: She has a normal mood and affect  Her speech is normal and behavior is normal  Judgment and thought content normal  Cognition and memory are normal    Nursing note and vitals reviewed               Current Outpatient Medications:     Ascorbic Acid (VITAMIN C GUMMIE PO), Take by mouth daily, Disp: , Rfl:     Calcium Citrate 200 MG TABS, One tablet daily, Disp: , Rfl:     cholecalciferol (VITAMIN D3) 1,000 units tablet, Take 2,000 Units by mouth daily , Disp: , Rfl:     Cyanocobalamin ER (HM VITAMIN B12) 1000 MCG TBCR, Take 1 tablet by mouth daily , Disp: , Rfl:     diphenhydrAMINE HCl (BENADRYL ALLERGY PO), Take by mouth, Disp: , Rfl:     Flaxseed Oil OIL, Take 1 capsule by mouth daily , Disp: , Rfl:     Glucos-Chondroit-Collag-Hyal (GLUCOSAMINE CHONDROIT-COLLAGEN PO), One tablet daily, Disp: , Rfl:     levothyroxine 112 mcg tablet, Take 112 mcg by mouth , Disp: , Rfl:     loratadine (CLARITIN) 10 mg tablet, Take 10 mg by mouth daily, Disp: , Rfl:     Lysine 1000 MG TABS, Take 1,000 mg by mouth daily , Disp: , Rfl:     Magnesium 400 MG TABS, Take 1 tablet by mouth daily , Disp: , Rfl:     meclizine (ANTIVERT) 12 5 MG tablet, Take 1 tablet (12 5 mg total) by mouth every 8 (eight) hours as needed for dizziness, Disp: 30 tablet, Rfl: 0    Multiple Vitamin (DAILY VITAMINS PO), Take 1 tablet by mouth daily, Disp: , Rfl:     OMEGA-3 FATTY ACIDS-VITAMIN E PO, Take 1,000 mg by mouth daily , Disp: , Rfl:     ondansetron (ZOFRAN-ODT) 4 mg disintegrating tablet, Take 1 tablet (4 mg total) by mouth every 6 (six) hours as needed for nausea or vomiting, Disp: 20 tablet, Rfl: 2   predniSONE 10 mg tablet, 6 tabs Day 1, 5 tabs Day 2, 4 tabs Day 3, 3 tabs Day 4, 2 tabs Day 5, 1 tab Day 6 , Disp: 21 tablet, Rfl: 0    pyridoxine (B-6) 100 MG tablet, One tablet daily, Disp: , Rfl:     Turmeric 500 MG CAPS, Take 1 tablet by mouth daily , Disp: , Rfl:     valACYclovir (VALTREX) 1,000 mg tablet, Take 1 tablet (1,000 mg total) by mouth 2 (two) times a day for 1 day, Disp: 4 tablet, Rfl: 1  Allergies   Allergen Reactions    Bee Venom Anaphylaxis    Dust Mite Extract     Gabapentin Dizziness     Annotation - [id number]: "felt like out of body experience"    Molds & Smuts Sneezing    Other      Annotation - [id number]: seasonal, Trees    Pollen Extract     Bernard Grass Pollen Allergen     Latex Rash

## 2020-07-20 NOTE — PATIENT INSTRUCTIONS
Complete blood work, this is non fasting  Make appointment with physical therapy  Please call the office if you are experiencing any worsening of symptoms or no symptom improvement  Arthralgia   WHAT YOU NEED TO KNOW:   Arthralgia is pain in one or more joints, with no inflammation  It may be short-term and get better within 6 to 8 weeks  Arthralgia can be an early sign of arthritis  Arthralgia may be caused by a medical condition, such as a hormone disorder or a tumor  It may also be caused by an infection or injury  DISCHARGE INSTRUCTIONS:   Medicines: The following medicines may  be ordered for you:  · Acetaminophen  decreases pain  Ask how much to take and how often to take it  Follow directions  Acetaminophen can cause liver damage if not taken correctly  · NSAIDs  decrease pain and prevent swelling  Ask your healthcare provider which medicine is right for you  Ask how much to take and when to take it  Take as directed  NSAIDs can cause stomach bleeding and kidney problems if not taken correctly  · Pain relief cream  decreases pain  Use this cream as directed  · Take your medicine as directed  Contact your healthcare provider if you think your medicine is not helping or if you have side effects  Tell him of her if you are allergic to any medicine  Keep a list of the medicines, vitamins, and herbs you take  Include the amounts, and when and why you take them  Bring the list or the pill bottles to follow-up visits  Carry your medicine list with you in case of an emergency  Follow up with your healthcare provider or specialist as directed:  Write down your questions so you remember to ask them during your visits  Self-care:   · Apply heat  to help decrease pain  Use a heating pad or heat wrap  Apply heat for 20 to 30 minutes every 2 hours for as many days as directed  · Rest  as much as possible  Avoid activities that cause joint pain       · Apply ice  to help decrease swelling and pain  Ice may also help prevent tissue damage  Use an ice pack, or put crushed ice in a plastic bag  Cover it with a towel and place it on your painful joint for 15 to 20 minutes every hour or as directed  · Support  the joint with a brace or elastic wrap as directed  · Elevate  your joint above the level of your heart as often as you can to help decrease swelling and pain  Prop your painful joint on pillows or blankets to keep it elevated comfortably  · Lose weight  if you are overweight  Extra weight can put pressure on your joints and cause more pain  Ask your healthcare provider how much you should weigh  Ask him to help you create a weight loss plan  · Exercise  regularly to help improve joint movement and to decrease pain  Ask about the best exercise plan for you  Low-impact exercises can help take the pressure off your joints  Examples are walking, swimming, and water aerobics  Physical therapy:  A physical therapist teaches you exercises to help improve movement and strength, and to decrease pain  Ask your healthcare provider if physical therapy is right for you  Contact your healthcare provider or specialist if:   · You have a fever  · You continue to have joint pain that cannot be relieved with heat, ice, or medicine  · You have pain and inflammation around your joint  · You have questions or concerns about your condition or care  Return to the emergency department if:   · You have sudden, severe pain when you move your joint  · You have a fever and shaking chills  · You cannot move your joint  · You lose feeling on the side of your body where you have the painful joint  © 2017 [employer] Information is for End User's use only and may not be sold, redistributed or otherwise used for commercial purposes  All illustrations and images included in CareNotes® are the copyrighted property of [employer]  or Valentin Ribeiro    The above information is an  only  It is not intended as medical advice for individual conditions or treatments  Talk to your doctor, nurse or pharmacist before following any medical regimen to see if it is safe and effective for you

## 2020-07-21 ENCOUNTER — APPOINTMENT (OUTPATIENT)
Dept: LAB | Age: 50
End: 2020-07-21
Payer: COMMERCIAL

## 2020-07-21 DIAGNOSIS — G89.29 CHRONIC PAIN OF RIGHT KNEE: ICD-10-CM

## 2020-07-21 DIAGNOSIS — M54.50 CHRONIC MIDLINE LOW BACK PAIN WITHOUT SCIATICA: ICD-10-CM

## 2020-07-21 DIAGNOSIS — G89.29 CHRONIC PAIN OF MULTIPLE JOINTS: ICD-10-CM

## 2020-07-21 DIAGNOSIS — M25.552 BILATERAL HIP PAIN: ICD-10-CM

## 2020-07-21 DIAGNOSIS — G89.29 CHRONIC MIDLINE LOW BACK PAIN WITHOUT SCIATICA: ICD-10-CM

## 2020-07-21 DIAGNOSIS — M25.551 BILATERAL HIP PAIN: ICD-10-CM

## 2020-07-21 DIAGNOSIS — M25.50 CHRONIC PAIN OF MULTIPLE JOINTS: ICD-10-CM

## 2020-07-21 DIAGNOSIS — M25.561 CHRONIC PAIN OF RIGHT KNEE: ICD-10-CM

## 2020-07-21 LAB
CRP SERPL QL: 4 MG/L
RHEUMATOID FACT SER QL LA: NEGATIVE

## 2020-07-21 PROCEDURE — 86038 ANTINUCLEAR ANTIBODIES: CPT

## 2020-07-21 PROCEDURE — 86618 LYME DISEASE ANTIBODY: CPT

## 2020-07-21 PROCEDURE — 36415 COLL VENOUS BLD VENIPUNCTURE: CPT

## 2020-07-21 PROCEDURE — 86430 RHEUMATOID FACTOR TEST QUAL: CPT

## 2020-07-21 PROCEDURE — 86140 C-REACTIVE PROTEIN: CPT

## 2020-07-21 PROCEDURE — 86200 CCP ANTIBODY: CPT

## 2020-07-22 LAB
B BURGDOR IGG+IGM SER-ACNC: <0.91 ISR (ref 0–0.9)
CCP IGA+IGG SERPL IA-ACNC: 3 UNITS (ref 0–19)
RYE IGE QN: NEGATIVE

## 2020-07-23 ENCOUNTER — TELEPHONE (OUTPATIENT)
Dept: FAMILY MEDICINE CLINIC | Facility: CLINIC | Age: 50
End: 2020-07-23

## 2020-07-23 DIAGNOSIS — R79.82 ELEVATED C-REACTIVE PROTEIN (CRP): Primary | ICD-10-CM

## 2020-07-23 NOTE — TELEPHONE ENCOUNTER
Patient wants to know if she should be doing anything for this:  inflammatory marker being very mildly elevated

## 2020-07-28 ENCOUNTER — EVALUATION (OUTPATIENT)
Dept: PHYSICAL THERAPY | Facility: MEDICAL CENTER | Age: 50
End: 2020-07-28
Payer: COMMERCIAL

## 2020-07-28 DIAGNOSIS — M25.50 CHRONIC PAIN OF MULTIPLE JOINTS: ICD-10-CM

## 2020-07-28 DIAGNOSIS — M25.552 BILATERAL HIP PAIN: ICD-10-CM

## 2020-07-28 DIAGNOSIS — G89.29 CHRONIC MIDLINE LOW BACK PAIN WITHOUT SCIATICA: ICD-10-CM

## 2020-07-28 DIAGNOSIS — M25.551 BILATERAL HIP PAIN: ICD-10-CM

## 2020-07-28 DIAGNOSIS — M25.561 CHRONIC PAIN OF RIGHT KNEE: Primary | ICD-10-CM

## 2020-07-28 DIAGNOSIS — G89.29 CHRONIC PAIN OF RIGHT KNEE: Primary | ICD-10-CM

## 2020-07-28 DIAGNOSIS — G89.29 CHRONIC PAIN OF MULTIPLE JOINTS: ICD-10-CM

## 2020-07-28 DIAGNOSIS — M54.50 CHRONIC MIDLINE LOW BACK PAIN WITHOUT SCIATICA: ICD-10-CM

## 2020-07-28 PROCEDURE — 97161 PT EVAL LOW COMPLEX 20 MIN: CPT | Performed by: PHYSICAL THERAPIST

## 2020-07-28 PROCEDURE — 97110 THERAPEUTIC EXERCISES: CPT | Performed by: PHYSICAL THERAPIST

## 2020-07-28 NOTE — PROGRESS NOTES
PT Evaluation     Today's date: 2020  Patient name: Marvie Boeck  : 1970  MRN: [id number]  Referring provider: Nini Ovalles  Dx:   Encounter Diagnosis     ICD-10-CM    1  Chronic midline low back pain without sciatica M54 5 Ambulatory referral to Physical Therapy    G89 29    2  Bilateral hip pain M25 551 Ambulatory referral to Physical Therapy    M25 552    3  Chronic pain of right knee M25 561 Ambulatory referral to Physical Therapy    G89 29    4  Chronic pain of multiple joints M25 50 Ambulatory referral to Physical Therapy    G89 29                   Assessment  Assessment details: Marvie Boeck is a pleasant 44 y o  female who presents with chronic R knee pain and R-sided LBP with occasional radiation to the R lateral hip of an insidious onset  The patient's greatest concerns are worry over not knowing what's wrong, wanting to avoid surgery and fear of not being able to keep active  No further referral appears necessary at this time based upon examination results  Primary movement impairment diagnosis of impaired R quadriceps activation resulting in pathoanatomical symptoms of Chronic pain of right knee and limiting her ability to ascend/descend stairs  Repetitive abnormal movement pattern contributes to excessive compensatory and resultant lumbar hypomobility, which prevents her from standing for prolonged periods  In addition, patient presents with neural tension in RLE that further limits ability to participate in household chores  Patient would benefit from skilled PT services to address the listed impairments and facilitate a return to PLOF   Thank you for the referral     Primary Impairments:  1) Decreased R quad strength--> addressing with neuromotor re-education and progressive strengthening  2) Decreased R knee FLX/EXT ROM--> addressing with manual interventions, stretching and active exercises  3) Lumbar hypomobility--> addressing with manual interventions, stretching, and active exercise  4) R neural tension--> addressing with manual and self-nerve glides        Impairments: abnormal coordination, abnormal muscle firing, abnormal muscle tone, abnormal or restricted ROM, abnormal movement, activity intolerance, impaired physical strength, lacks appropriate home exercise program, pain with function and poor body mechanics  Functional limitations: ascending/descending stairs, standing, sitting  Symptom irritability: moderateBarriers to therapy: Chronicity of symptoms  Understanding of Dx/Px/POC: good   Prognosis: good  Prognosis details: Positive prognostic indicators include positive attitude toward recovery  Negative prognostic indicators include chronicity of symptoms  Goals  Patient will be independent with home exercise program    Patient will increase R knee FLX/EXT AROM to be comparable to contralateral side to be able to ascend/descend stairs  Patient will increase lumbar AROM to be able to participate in household chores  Patient will demonstrate decreased neural tension in RLE to be able to sit for prolonged periods  Patient will be able to walk 30 min at prior speed without limitation from low back or RLE  Patient will be able to manage symptoms independently  Plan  Plan details: Prognosis above is given PT services 2x/week tapering to 1x/week over the next 2 months and home program adherence    Patient would benefit from: skilled physical therapy  Referral necessary: No  Planned modality interventions: thermotherapy: hydrocollator packs and cryotherapy  Planned therapy interventions: activity modification, joint mobilization, manual therapy, motor coordination training, neuromuscular re-education, patient education, self care, therapeutic activities, therapeutic exercise, graded activity, home exercise program, behavior modification, functional ROM exercises, flexibility, strengthening and stretching  Frequency: 2x week  Duration in weeks: 6  Treatment plan discussed with: patient        Subjective Evaluation    History of Present Illness  Mechanism of injury: This is a 43 yo female presenting with LBP, bilateral hip pain, and R knee pain  She reports that this started as bilateral knee/thigh pain (R worse than L) of an insidious onset in 2020  She describes pain on the inside of her R knee and the back of her R knee that is worsened with going up and down the stairs  Then, the pain traveled up to the center of her back and radiates to the R side of her back without radiation  No numbness/tingling  The LBP is worse when standing and sitting for prolonged periods  Walking helps alleviate the pain in her back  In addition, she describes bilateral hip pain when sitting for prolonged periods  She did have Doppler US on bilateral LE that were clear for DVT  She is currently working at a bank, and she reports that her job duties involve periods of prolonged sitting  She has a hx of thyroid cancer that was removed in   Concerns: that it is something serious, wanting to avoid surgery        Quality of life: good    Pain  Current pain ratin  At best pain ratin  At worst pain ratin  Location: R medial knee and R posterior knee, center of low back, outside of R hip  Quality: dull ache  Aggravating factors: stair climbing, sitting and standing    Social Support    Employment status: working    Diagnostic Tests  No diagnostic tests performed  Treatments  Previous treatment: chiropractic (1 5 years ago)  Patient Goals  Patient goals for therapy: decreased pain, increased strength, return to sport/leisure activities and increased motion  Patient goal: to be able to walk longer distances, to be able to walk faster, to be able to perform household chores        Objective     Palpation     Right   Hypertonic in the distal semimembranosus and distal semitendinosus  Tenderness of the distal semimembranosus and distal semitendinosus  Tenderness     Lumbar Spine  Tenderness in the spinous process (L3-L4-L5)  Right Hip   Tenderness in the PSIS  Right Knee   Tenderness in the medial joint line  Neurological Testing     Sensation     Lumbar   Left   Intact: light touch    Right   Intact: light touch    Active Range of Motion     Lumbar   Flexion:  with pain Restriction level: moderate  Extension:  with pain Restriction level: maximal  Left rotation: Active left lumbar rotation: limited 25%  with pain Restriction level: moderate  Right rotation: Active right lumbar rotation: limited 50%  Restriction level: moderate  Left Knee   Flexion: 125 degrees   Extension: 0 degrees     Right Knee   Flexion: 120 degrees   Extension: 3 degrees     Additional Active Range of Motion Details  Standing lumbar FLX/EXT AROM repeated motion exam unremarkable    Passive Range of Motion   Left Hip   External rotation (prone): [facility] [place]  Internal rotation (prone): WFL    Right Hip   External rotation (prone): [facility] [place]  Internal rotation (prone): [facility] [place]    Mobility   Patellar Mobility:   Left Knee   WFL: medial, lateral, superior and inferior  Right Knee   WFL: inferior  Hypomobile: medial, lateral and superior     Joint Play     Hypomobile: L1, L2, L3, L4, L5 and S1     Pain: L3, L4, L5 and S1     Strength/Myotome Testing     Left Hip   Planes of Motion   Flexion: 4+  Extension: 3+  Abduction: 3+    Right Hip   Planes of Motion   Flexion: 4+  Extension: 3+  Abduction: 3    Left Knee   Flexion: 4+  Extension: 5  Quadriceps contraction: good    Right Knee   Flexion: 4+  Extension: 2+  Quadriceps contraction: fair    Left Ankle/Foot   Dorsiflexion: 5  Plantar flexion: 5  Great toe extension strength: unable to achieve testing position  Right Ankle/Foot   Dorsiflexion: 5  Plantar flexion: 5  Great toe extension strength: unable to achieve testing position  Tests     Lumbar   Positive SIJ compression  Negative sacroiliac distraction       Left   Positive crossed SLR      Right   Positive passive SLR  Left Pelvic Girdle/Sacrum   Negative: thigh thrust      Right Pelvic Girdle/Sacrum   Positive: thigh thrust      Left Hip   Negative TIANA, FADIR and scour  Right Hip   Positive FADIR (lateral hip pain)  Negative TIANA and scour  Ambulation     Observational Gait   Decreased walking speed  Functional Assessment      Squat    Sitting toward left side, left tibial anterior translation beyond toes and right tibial anterior translation beyond toes  Single Leg Squat   Left Leg  Left trunk side bending, sitting toward left side, valgus and anterior tibial translation beyond toes  Right Leg  Pain (LBP), right trunk side bending, sitting toward right side, valgus and tibial anterior translation beyond toes  Single Leg Stance   Left: 10 seconds  Right: 10 seconds      Flowsheet Rows      Most Recent Value   PT/OT G-Codes   Current Score  63   Projected Score  75             Precautions: hx of carcinoma of thyroid, latex allergy      Manuals 7/28            R patellar mobs (medial/lateral)             R tibiofemoral distraction?              Lumbar PAs             Sacral rocking             Neuro Re-Ed             TA activation             TA marches             TA bugs             Supine clams             Supine hip ADD isometrics                                       Ther Ex             Rec bike LAW             QS HEP            Strap gastroc stretch NV            Seated HS stretch NV            SLR NV            LTR HEP            SKTC NV                                      Ther Activity                                       Gait Training                                       Modalities

## 2020-07-31 ENCOUNTER — OFFICE VISIT (OUTPATIENT)
Dept: PHYSICAL THERAPY | Facility: MEDICAL CENTER | Age: 50
End: 2020-07-31
Payer: COMMERCIAL

## 2020-07-31 DIAGNOSIS — G89.29 CHRONIC PAIN OF RIGHT KNEE: Primary | ICD-10-CM

## 2020-07-31 DIAGNOSIS — M54.50 CHRONIC MIDLINE LOW BACK PAIN WITHOUT SCIATICA: ICD-10-CM

## 2020-07-31 DIAGNOSIS — M25.561 CHRONIC PAIN OF RIGHT KNEE: Primary | ICD-10-CM

## 2020-07-31 DIAGNOSIS — M25.552 BILATERAL HIP PAIN: ICD-10-CM

## 2020-07-31 DIAGNOSIS — G89.29 CHRONIC MIDLINE LOW BACK PAIN WITHOUT SCIATICA: ICD-10-CM

## 2020-07-31 DIAGNOSIS — M25.551 BILATERAL HIP PAIN: ICD-10-CM

## 2020-07-31 PROCEDURE — 97110 THERAPEUTIC EXERCISES: CPT | Performed by: PHYSICAL THERAPIST

## 2020-07-31 PROCEDURE — 97140 MANUAL THERAPY 1/> REGIONS: CPT | Performed by: PHYSICAL THERAPIST

## 2020-07-31 NOTE — PROGRESS NOTES
Daily Note     Today's date: 2020  Patient name: Salima Langford  : 1970  MRN: [id number]  Referring provider: Elizabeth Lindsay*  Dx:   Encounter Diagnosis     ICD-10-CM    1  Chronic pain of right knee M25 561     G89 29    2  Chronic midline low back pain without sciatica M54 5     G89 29    3  Bilateral hip pain M25 551     M25 552                   Subjective: Patient reports that her knee is starting to feel a little better  She was able to go on her elliptical this morning with no difficulty  Objective: See treatment diary below    R knee AROM: 0-125 deg    Assessment: Patient demonstrated full R knee FLX/EXT AROM today post-treatment  She also demonstrated improved patellar mobility  Added SLR to HEP to further improve quad strength and SKTC to address lumbar hypomobility  Patient would benefit from continued PT to address impairments to increase functional activity tolerance  Plan: Continue per plan of care  Precautions: hx of carcinoma of thyroid, latex allergy      Manuals            R patellar mobs   KP 4-way           Lumbar PAs             Sacral rocking             Neuro Re-Ed             TA activation             TA marches             TA bugs                                       Ther Ex             Rec bike LWA  5'           QS HEP 30x5:           Strap gastroc stretch NV 4x30:           SLR NV 2x10 4-way?           LTR HEP 20x5:           SKTC NV 10x10:           Supine clams  20x5: pink                        Ther Activity                                       Gait Training                                       Modalities

## 2020-08-04 ENCOUNTER — OFFICE VISIT (OUTPATIENT)
Dept: PHYSICAL THERAPY | Facility: MEDICAL CENTER | Age: 50
End: 2020-08-04
Payer: COMMERCIAL

## 2020-08-04 DIAGNOSIS — G89.29 CHRONIC PAIN OF RIGHT KNEE: Primary | ICD-10-CM

## 2020-08-04 DIAGNOSIS — G89.29 CHRONIC MIDLINE LOW BACK PAIN WITHOUT SCIATICA: ICD-10-CM

## 2020-08-04 DIAGNOSIS — M25.552 BILATERAL HIP PAIN: ICD-10-CM

## 2020-08-04 DIAGNOSIS — M25.551 BILATERAL HIP PAIN: ICD-10-CM

## 2020-08-04 DIAGNOSIS — M54.50 CHRONIC MIDLINE LOW BACK PAIN WITHOUT SCIATICA: ICD-10-CM

## 2020-08-04 DIAGNOSIS — M25.561 CHRONIC PAIN OF RIGHT KNEE: Primary | ICD-10-CM

## 2020-08-04 PROCEDURE — 97140 MANUAL THERAPY 1/> REGIONS: CPT | Performed by: PHYSICAL THERAPIST

## 2020-08-04 PROCEDURE — 97110 THERAPEUTIC EXERCISES: CPT | Performed by: PHYSICAL THERAPIST

## 2020-08-04 NOTE — PROGRESS NOTES
Daily Note     Today's date: 2020  Patient name: Palmira Rodarte  : 1970  MRN: [id number]  Referring provider: Osorio Mann*  Dx:   Encounter Diagnosis     ICD-10-CM    1  Chronic pain of right knee  M25 561     G89 29    2  Chronic midline low back pain without sciatica  M54 5     G89 29    3  Bilateral hip pain  M25 551     M25 552                   Subjective: Patient reports that her knee is continuing to feel better with decreased pain  She reports that she had some soreness in her hips after last session  In addition, she states that her low back is sore this morning  Objective: See treatment diary below    R knee AROM: 0-125 deg    Assessment: Patient initially presented lacking 1 deg of R knee EXT AROM  However, she achieved full EXT ROM after patellar mobs  Added 4-way SLR today to improve multiplanar hip girdle and quad strength to alleviate compensatory strain on R knee during functional mobility  Also, continued with lumbar FLX-based mobility interventions in a pain-free range  Patient would benefit from continued PT to improve strength to be able to ascend/descend stairs and ambulate at a faster speed  Plan: Continue per plan of care        Precautions: hx of carcinoma of thyroid, latex allergy      Manuals           R patellar mobs   KP 4-way KP 4-way          Lumbar PAs             Sacral rocking             Neuro Re-Ed             TA activation             TA marches             TA bugs                                       Ther Ex             Rec bike LAW  5' 5'          QS HEP 30x5: 30x5:          Strap gastroc stretch NV 4x30: 4x30:          SLR NV 2x10 2x10 4-way          LTR HEP 20x5: 20x5:          SKTC NV 10x10: 10x10:          Supine clams  20x5: pink 20x5: pink                       Ther Activity                                       Gait Training                                       Modalities

## 2020-08-07 ENCOUNTER — APPOINTMENT (OUTPATIENT)
Dept: PHYSICAL THERAPY | Facility: MEDICAL CENTER | Age: 50
End: 2020-08-07
Payer: COMMERCIAL

## 2020-08-11 ENCOUNTER — OFFICE VISIT (OUTPATIENT)
Dept: PHYSICAL THERAPY | Facility: MEDICAL CENTER | Age: 50
End: 2020-08-11
Payer: COMMERCIAL

## 2020-08-11 DIAGNOSIS — G89.29 CHRONIC MIDLINE LOW BACK PAIN WITHOUT SCIATICA: Primary | ICD-10-CM

## 2020-08-11 DIAGNOSIS — M25.551 BILATERAL HIP PAIN: ICD-10-CM

## 2020-08-11 DIAGNOSIS — M25.552 BILATERAL HIP PAIN: ICD-10-CM

## 2020-08-11 DIAGNOSIS — M25.561 CHRONIC PAIN OF RIGHT KNEE: ICD-10-CM

## 2020-08-11 DIAGNOSIS — M54.50 CHRONIC MIDLINE LOW BACK PAIN WITHOUT SCIATICA: Primary | ICD-10-CM

## 2020-08-11 DIAGNOSIS — G89.29 CHRONIC PAIN OF RIGHT KNEE: ICD-10-CM

## 2020-08-11 PROCEDURE — 97140 MANUAL THERAPY 1/> REGIONS: CPT | Performed by: PHYSICAL THERAPIST

## 2020-08-11 PROCEDURE — 97110 THERAPEUTIC EXERCISES: CPT | Performed by: PHYSICAL THERAPIST

## 2020-08-11 NOTE — PROGRESS NOTES
Daily Note     Today's date: 2020  Patient name: Tali Valladares  : 1970  MRN: [id number]  Referring provider: Lisa Medina*  Dx:   Encounter Diagnosis     ICD-10-CM    1  Chronic midline low back pain without sciatica  M54 5     G89 29    2  Bilateral hip pain  M25 551     M25 552    3  Chronic pain of right knee  M25 561     G89 29                   Subjective: Patient reports that the inside of her knee is feeling a little sore today, but she was doing a lot of yardwork over the weekend  Overall, she feels that her low back and knee are improving, but her low back was sore yesterday after leaning forward when doing household chores  Objective: See treatment diary below      Assessment: Held strap gastroc stretch today due to patient achieving full EXT ROM  Progressed resistance for 4-way SLR, which demonstrates an improvement in multiplanar hip strength  Added standing hip ABD to improve CKC hip girdle strength  Patient would benefit from continued PT to improve functional mobility to return to full participation in active lifestyle  Plan: Continue per plan of care        Precautions: hx of carcinoma of thyroid, latex allergy      Manuals          R patellar mobs   KP 4-way KP 4-way KP 4-way         Lumbar PAs             Sacral rocking             Neuro Re-Ed             TA activation             TA marches             TA bugs                                       Ther Ex             Rec bike LAW  5' 5' 10'         QS HEP 30x5: 30x5: 30x5:         Strap gastroc stretch NV 4x30: 4x30: np         SLR NV 2x10 2x10 4-way 2x10 4-way 1#         LTR HEP 20x5: 20x5: 20x5:         SKTC NV 10x10: 10x10: 10x10:         Supine clams  20x5: pink 20x5: pink 30x5: pink         Standing hip ABD    2x10          Ther Activity                                       Gait Training                                       Modalities

## 2020-08-18 ENCOUNTER — OFFICE VISIT (OUTPATIENT)
Dept: PHYSICAL THERAPY | Facility: MEDICAL CENTER | Age: 50
End: 2020-08-18
Payer: COMMERCIAL

## 2020-08-18 DIAGNOSIS — M25.552 BILATERAL HIP PAIN: ICD-10-CM

## 2020-08-18 DIAGNOSIS — M25.551 BILATERAL HIP PAIN: ICD-10-CM

## 2020-08-18 DIAGNOSIS — G89.29 CHRONIC PAIN OF RIGHT KNEE: ICD-10-CM

## 2020-08-18 DIAGNOSIS — M54.50 CHRONIC MIDLINE LOW BACK PAIN WITHOUT SCIATICA: Primary | ICD-10-CM

## 2020-08-18 DIAGNOSIS — M25.561 CHRONIC PAIN OF RIGHT KNEE: ICD-10-CM

## 2020-08-18 DIAGNOSIS — G89.29 CHRONIC MIDLINE LOW BACK PAIN WITHOUT SCIATICA: Primary | ICD-10-CM

## 2020-08-18 PROCEDURE — 97140 MANUAL THERAPY 1/> REGIONS: CPT | Performed by: PHYSICAL THERAPIST

## 2020-08-18 PROCEDURE — 97110 THERAPEUTIC EXERCISES: CPT | Performed by: PHYSICAL THERAPIST

## 2020-08-18 NOTE — PROGRESS NOTES
Daily Note     Today's date: 2020  Patient name: Renay Garner  : 1970  MRN: [id number]  Referring provider: Yvonne Cervantes*  Dx:   Encounter Diagnosis     ICD-10-CM    1  Chronic midline low back pain without sciatica  M54 5     G89 29    2  Bilateral hip pain  M25 551     M25 552    3  Chronic pain of right knee  M25 561     G89 29                   Subjective: Patient reports that she is feeling much better with decreased pain in her knee  She reports that her  even noticed that she is not limping as much  In addition, she has been having less pain in her back as well  Objective: See treatment diary below      Assessment: Patient presented initially lacking ~1 deg of R knee EXT ROM that improved to 0 deg after manual interventions and QS  Able to progress resistance for all quad and hip girdle strengthening, which demonstrates good progress toward goals  Patient would benefit from continued PT to improve functional strength to return to full participation in active lifestyle  Plan: Continue per plan of care        Precautions: hx of carcinoma of thyroid, latex allergy      Manuals         R patellar mobs   KP 4-way KP 4-way KP 4-way KP 4-way        Lumbar PAs             Sacral rocking             Neuro Re-Ed             TA activation             TA marches             TA bugs                                       Ther Ex             Rec bike LAW  5' 5' 10' 10'        QS HEP 30x5: 30x5: 30x5: 30x5:        Strap gastroc stretch NV 4x30: 4x30: np np        SLR NV 2x10 2x10 4-way 2x10 4-way 1# 2x10 4-way 2#        LTR HEP 20x5: 20x5: 20x5: 20x5:        SKTC NV 10x10: 10x10: 10x10: 10x10:        Supine clams  20x5: pink 20x5: pink 30x5: pink 30x5: green        Standing hip ABD    2x10  2x10 1#        Ther Activity                                       Gait Training                                       Modalities

## 2020-08-21 ENCOUNTER — OFFICE VISIT (OUTPATIENT)
Dept: PHYSICAL THERAPY | Facility: MEDICAL CENTER | Age: 50
End: 2020-08-21
Payer: COMMERCIAL

## 2020-08-21 DIAGNOSIS — M25.561 CHRONIC PAIN OF RIGHT KNEE: ICD-10-CM

## 2020-08-21 DIAGNOSIS — G89.29 CHRONIC PAIN OF RIGHT KNEE: ICD-10-CM

## 2020-08-21 DIAGNOSIS — M25.551 BILATERAL HIP PAIN: ICD-10-CM

## 2020-08-21 DIAGNOSIS — G89.29 CHRONIC MIDLINE LOW BACK PAIN WITHOUT SCIATICA: Primary | ICD-10-CM

## 2020-08-21 DIAGNOSIS — M54.50 CHRONIC MIDLINE LOW BACK PAIN WITHOUT SCIATICA: Primary | ICD-10-CM

## 2020-08-21 DIAGNOSIS — M25.552 BILATERAL HIP PAIN: ICD-10-CM

## 2020-08-21 PROCEDURE — 97110 THERAPEUTIC EXERCISES: CPT | Performed by: PHYSICAL THERAPIST

## 2020-08-21 NOTE — PROGRESS NOTES
Daily Note     Today's date: 2020  Patient name: Harman Torres  : 1970  MRN: [id number]  Referring provider: Siobhan Horner*  Dx:   Encounter Diagnosis     ICD-10-CM    1  Chronic midline low back pain without sciatica  M54 5     G89 29    2  Bilateral hip pain  M25 551     M25 552    3  Chronic pain of right knee  M25 561     G89 29                   Subjective: Patient reports that she is continuing to feel better with no significant limitations at home from her knee or back  She reports that she is pleased with progress thus far  Objective: See treatment diary below      Assessment: Held patellar mobs due to patient demonstrating full knee R EXT AROM today  Continued to focus on hip girdle and quad strengthening along with gentle lumbar mobility TE due to positive response after last session  Patient is making steady progress toward goals  Will d/c to HEP NV if patient continues to remain asymptomatic  Patient would benefit from continued PT to further progress HEP in order to prevent recurrent flare-ups with return to household chores and active lifestyle  Plan: Potential discharge next visit       Precautions: hx of carcinoma of thyroid, latex allergy      Manuals        R patellar mobs   KP 4-way KP 4-way KP 4-way KP 4-way        Lumbar PAs             Sacral rocking             Neuro Re-Ed             TA activation             TA marches             TA bugs                                       Ther Ex             Rec bike LAW  5' 5' 10' 10' 10'       QS HEP 30x5: 30x5: 30x5: 30x5: 30x5:       Strap gastroc stretch NV 4x30: 4x30: np np np       SLR NV 2x10 2x10 4-way 2x10 4-way 1# 2x10 4-way 2# 2x10 4-way 2#       LTR HEP 20x5: 20x5: 20x5: 20x5: 20x5:       SKTC NV 10x10: 10x10: 10x10: 10x10: 10x10:       Supine clams  20x5: pink 20x5: pink 30x5: pink 30x5: green 30x5: green        Standing hip ABD    2x10  2x10 1# 2x10 1#       Ther Activity Gait Training                                       Modalities

## 2020-08-25 ENCOUNTER — APPOINTMENT (OUTPATIENT)
Dept: PHYSICAL THERAPY | Facility: MEDICAL CENTER | Age: 50
End: 2020-08-25
Payer: COMMERCIAL

## 2020-08-28 ENCOUNTER — APPOINTMENT (OUTPATIENT)
Dept: PHYSICAL THERAPY | Facility: MEDICAL CENTER | Age: 50
End: 2020-08-28
Payer: COMMERCIAL

## 2020-09-01 ENCOUNTER — APPOINTMENT (OUTPATIENT)
Dept: PHYSICAL THERAPY | Facility: MEDICAL CENTER | Age: 50
End: 2020-09-01
Payer: COMMERCIAL

## 2020-09-03 ENCOUNTER — OFFICE VISIT (OUTPATIENT)
Dept: FAMILY MEDICINE CLINIC | Facility: CLINIC | Age: 50
End: 2020-09-03
Payer: COMMERCIAL

## 2020-09-03 VITALS
WEIGHT: 180 LBS | HEART RATE: 80 BPM | DIASTOLIC BLOOD PRESSURE: 78 MMHG | HEIGHT: 65 IN | RESPIRATION RATE: 16 BRPM | BODY MASS INDEX: 29.99 KG/M2 | TEMPERATURE: 98 F | OXYGEN SATURATION: 97 % | SYSTOLIC BLOOD PRESSURE: 102 MMHG

## 2020-09-03 DIAGNOSIS — E78.2 MIXED HYPERLIPIDEMIA: ICD-10-CM

## 2020-09-03 DIAGNOSIS — E55.9 VITAMIN D DEFICIENCY: ICD-10-CM

## 2020-09-03 DIAGNOSIS — Z85.850 HISTORY OF THYROID CANCER: ICD-10-CM

## 2020-09-03 DIAGNOSIS — E89.0 POSTOPERATIVE HYPOTHYROIDISM: ICD-10-CM

## 2020-09-03 DIAGNOSIS — Z00.00 HEALTHCARE MAINTENANCE: Primary | ICD-10-CM

## 2020-09-03 PROCEDURE — 99396 PREV VISIT EST AGE 40-64: CPT | Performed by: FAMILY MEDICINE

## 2020-09-03 PROCEDURE — 1036F TOBACCO NON-USER: CPT | Performed by: FAMILY MEDICINE

## 2020-09-04 ENCOUNTER — OFFICE VISIT (OUTPATIENT)
Dept: PHYSICAL THERAPY | Facility: MEDICAL CENTER | Age: 50
End: 2020-09-04
Payer: COMMERCIAL

## 2020-09-04 DIAGNOSIS — G89.29 CHRONIC PAIN OF RIGHT KNEE: ICD-10-CM

## 2020-09-04 DIAGNOSIS — M25.561 CHRONIC PAIN OF RIGHT KNEE: ICD-10-CM

## 2020-09-04 DIAGNOSIS — M54.50 CHRONIC MIDLINE LOW BACK PAIN WITHOUT SCIATICA: Primary | ICD-10-CM

## 2020-09-04 DIAGNOSIS — M25.551 BILATERAL HIP PAIN: ICD-10-CM

## 2020-09-04 DIAGNOSIS — M25.552 BILATERAL HIP PAIN: ICD-10-CM

## 2020-09-04 DIAGNOSIS — G89.29 CHRONIC MIDLINE LOW BACK PAIN WITHOUT SCIATICA: Primary | ICD-10-CM

## 2020-09-04 PROCEDURE — 97110 THERAPEUTIC EXERCISES: CPT | Performed by: PHYSICAL THERAPIST

## 2020-09-04 NOTE — PROGRESS NOTES
Discharge Summary    Today's date: 2020  Patient name: Bernabe Lewis  : 1970  MRN: [id number]  Referring provider: Stephenie Brooks*  Dx:   Encounter Diagnosis     ICD-10-CM    1  Chronic midline low back pain without sciatica  M54 5     G89 29    2  Bilateral hip pain  M25 551     M25 552    3  Chronic pain of right knee  M25 561     G89 29                   Subjective: Patient reports that she is feeling much better with no low back, hip, or knee pain  She is able to perform all of her household chores without limitation, and she feels ready to be discharged to her HEP  Objective: See treatment diary below      Assessment: Patient has made steady progress in lumbar mobility, knee ROM, and overall RLE strength since beginning PT  This has allowed her to return to stair climbing with reciprocal mechanics and ambulating longer distances without limitation  Patient has been compliant with PT session attendance and HEP performance, and she has met all of her goals for PT  She is independent in a comprehensive illustrated HEP for maintenance lumbar ROM and quad/hip girdle strength, and she is now discharged to her HEP  Goals  Patient will be independent with home exercise program - met   Patient will increase R knee FLX/EXT AROM to be comparable to contralateral side to be able to ascend/descend stairs  - met  Patient will increase lumbar AROM to be able to participate in household chores  - met  Patient will demonstrate decreased neural tension in RLE to be able to sit for prolonged periods  - met  Patient will be able to walk 30 min at prior speed without limitation from low back or RLE  - met  Patient will be able to manage symptoms independently  - met     Plan: Discharge to HEP       Precautions: hx of carcinoma of thyroid, latex allergy      Manuals       R patellar mobs   KP 4-way KP 4-way KP 4-way KP 4-way        Lumbar PAs             Sacral rocking Neuro Re-Ed             TA activation             TA marches             TA bugs                                       Ther Ex             Rec bike LAW  5' 5' 10' 10' 10' 8'      QS HEP 30x5: 30x5: 30x5: 30x5: 30x5:       Strap gastroc stretch NV 4x30: 4x30: np np np np      SLR NV 2x10 2x10 4-way 2x10 4-way 1# 2x10 4-way 2# 2x10 4-way 2# 2x10 4-way      LTR HEP 20x5: 20x5: 20x5: 20x5: 20x5: 20x5:      SKTC NV 10x10: 10x10: 10x10: 10x10: 10x10: 10x10:      Supine clams  20x5: pink 20x5: pink 30x5: pink 30x5: green 30x5: green        Standing hip ABD    2x10  2x10 1# 2x10 1# 2x10 2#      Ther Activity                                       Gait Training                                       Modalities

## 2020-09-06 NOTE — PROGRESS NOTES
Assessment/Plan:  Patient overall doing very well  Recheck yearly  Recommend yearly mammography  Recommend updated colonoscopy this year as she will be due  Recommend flu shot  Up-to-date with Endocrinology  Labs up-to-date  Patient to get vitamin-D level CMP lipid panel and C-reactive protein  Health Maintenance  Lifestyle Advice: attempt to lose weight and continue current medications  Diet: limited junk food  Exercise: infrequently  Dental: regular dental visits  Vision: no vision problems  Preventative Health: Female Preventative: Mammogram is due  Colon cancer screening is due  No problem-specific Assessment & Plan notes found for this encounter  Diagnoses and all orders for this visit:    Healthcare maintenance    Mixed hyperlipidemia  -     Lipid panel; Future  -     Comprehensive metabolic panel; Future    Vitamin D deficiency  -     Vitamin D 25 hydroxy; Future    History of thyroid cancer    Postoperative hypothyroidism                  Subjective:      Patient ID: Farhan Cnaada is a 44 y o  female  Patient here for yearly physical   Overall doing well        The following portions of the patient's history were reviewed and updated as appropriate: allergies, current medications, past family history, past medical history, past social history, past surgical history and problem list     Review of Systems   Constitutional: Negative for appetite change, fatigue, fever and unexpected weight change  HENT: Negative for congestion, ear pain, postnasal drip, rhinorrhea, sinus pressure, sinus pain and sore throat  Eyes: Negative for redness and visual disturbance  Respiratory: Negative for chest tightness and shortness of breath  Cardiovascular: Negative for chest pain, palpitations and leg swelling  Gastrointestinal: Negative for abdominal distention, abdominal pain, diarrhea and nausea  Endocrine: Negative for cold intolerance and heat intolerance     Genitourinary: Negative for dysuria and hematuria  Musculoskeletal: Negative for arthralgias, gait problem and myalgias  Skin: Negative for pallor and rash  Neurological: Negative for dizziness and headaches  Psychiatric/Behavioral: Negative for behavioral problems  The patient is not nervous/anxious  Objective:    /78 (BP Location: Left arm, Patient Position: Sitting, Cuff Size: Standard)   Pulse 80   Temp 98 °F (36 7 °C) (Temporal)   Resp 16   Ht 5' 5" (1 651 m)   Wt 81 6 kg (180 lb)   SpO2 97%   BMI 29 95 kg/m²          Physical Exam  Vitals signs and nursing note reviewed  Constitutional:       General: She is not in acute distress  HENT:      Head: Normocephalic and atraumatic  Eyes:      General: No scleral icterus  Conjunctiva/sclera: Conjunctivae normal       Pupils: Pupils are equal, round, and reactive to light  Neck:      Musculoskeletal: Normal range of motion and neck supple  Thyroid: No thyromegaly  Cardiovascular:      Rate and Rhythm: Normal rate and regular rhythm  Pulses:           Carotid pulses are 0 on the right side and 0 on the left side  Heart sounds: Normal heart sounds  No murmur  Pulmonary:      Effort: Pulmonary effort is normal  No respiratory distress  Breath sounds: Normal breath sounds  No wheezing  Abdominal:      General: There is no distension  Palpations: Abdomen is soft  Musculoskeletal:      Right lower leg: No edema  Left lower leg: No edema  Lymphadenopathy:      Cervical: No cervical adenopathy  Skin:     General: Skin is warm  Coloration: Skin is not pale  Neurological:      Mental Status: She is alert and oriented to person, place, and time  Cranial Nerves: No cranial nerve deficit  Deep Tendon Reflexes: Reflexes are normal and symmetric  Psychiatric:         Behavior: Behavior normal          Thought Content:  Thought content normal          Judgment: Judgment normal              Bee venom; Dust mite extract; Gabapentin; Molds & smuts; Other; Pollen extract; Bernard grass pollen allergen; and Latex    Negin Simms had no medications administered during this visit    Health Maintenance   Topic Date Due    Annual Physical  10/01/1986    Influenza Vaccine  07/01/2020    MAMMOGRAM  08/05/2020    Colonoscopy Surveillance  12/03/2020    BMI: Followup Plan  01/06/2021    HIV Screening  10/06/2020 (Originally 10/1/1983)    DTaP,Tdap,and Td Vaccines (1 - Tdap) 09/06/2021 (Originally 10/1/1989)    BMI: Adult  09/03/2021    Colorectal Cancer Screening  12/03/2025    Pneumococcal Vaccine: Pediatrics (0 to 5 Years) and At-Risk Patients (6 to 59 Years)  Aged Out    HIB Vaccine  Aged Out    Hepatitis B Vaccine  Aged Out    IPV Vaccine  Aged Out    Hepatitis A Vaccine  Aged Out    Meningococcal ACWY Vaccine  Aged Out    HPV Vaccine  Aged Out      Social History     Socioeconomic History    Marital status: /Civil Union     Spouse name: Not on file    Number of children: Not on file    Years of education: Not on file    Highest education level: Not on file   Occupational History    Not on file   Social Needs    Financial resource strain: Not on file    Food insecurity     Worry: Not on file     Inability: Not on file   [employer] needs     Medical: Not on file     Non-medical: Not on file   Tobacco Use    Smoking status: Never Smoker    Smokeless tobacco: Never Used   Substance and Sexual Activity    Alcohol use: Yes     Frequency: Monthly or less     Drinks per session: 1 or 2     Comment: socially    Drug use: No    Sexual activity: Not Currently   Lifestyle    Physical activity     Days per week: Not on file     Minutes per session: Not on file    Stress: Not on file   Relationships    Social connections     Talks on phone: Not on file     Gets together: Not on file     Attends [employer] service: Not on file     Active member of club or organization: Not on file     Attends meetings of clubs or organizations: Not on file     Relationship status: Not on file    Intimate partner violence     Fear of current or ex partner: Not on file     Emotionally abused: Not on file     Physically abused: Not on file     Forced sexual activity: Not on file   Other Topics Concern    Not on file   Social History Narrative    Not on file      Family History   Problem Relation Age of Onset    Thyroid disease Mother     Cancer Father     Thyroid disease Sister     Thyroid disease Brother     Diabetes Maternal Grandmother       Past Medical History:   Diagnosis Date    Cervical neuritis 05/16/2013    C7- T1 Left, Last assessed    Endometriosis, cervix     GERD (gastroesophageal reflux disease)     Herpes     Hiatal hernia     R INGUINAL    Hypothyroidism     Thyroid cancer ([facility] [address] )     THYROID      has a past surgical history that includes Colonoscopy; Thyroidectomy; Hysterectomy (2005); Tubal ligation; Hernia repair; and pr lap,cholecystectomy (N/A, 5/23/2019)     Patient Active Problem List    Diagnosis Date Noted    Vitamin D deficiency 07/12/2017    Abnormal fasting glucose 07/11/2017    Seasonal allergies 08/29/2016    History of thyroid cancer 08/27/2015    Postoperative hypothyroidism 08/27/2015    Lumbar degenerative disc disease 01/06/2015    Herpes simplex type 1 infection 01/21/2013    Depression 10/01/2012    Papillary carcinoma of thyroid ([address] ) 09/14/2012       Current Outpatient Medications:     Ascorbic Acid (VITAMIN C GUMMIE PO), Take by mouth daily, Disp: , Rfl:     Calcium Citrate 200 MG TABS, One tablet daily, Disp: , Rfl:     cholecalciferol (VITAMIN D3) 1,000 units tablet, Take 2,000 Units by mouth daily , Disp: , Rfl:     Cyanocobalamin ER (HM VITAMIN B12) 1000 MCG TBCR, Take 1 tablet by mouth daily , Disp: , Rfl:     Flaxseed Oil OIL, Take 1 capsule by mouth daily , Disp: , Rfl:     Glucos-Chondroit-Collag-Hyal (GLUCOSAMINE CHONDROIT-COLLAGEN PO), One tablet daily, Disp: , Rfl:     levothyroxine 112 mcg tablet, Take 112 mcg by mouth , Disp: , Rfl:     loratadine (CLARITIN) 10 mg tablet, Take 10 mg by mouth daily, Disp: , Rfl:     Lysine 1000 MG TABS, Take 1,000 mg by mouth daily , Disp: , Rfl:     Magnesium 400 MG TABS, Take 1 tablet by mouth daily , Disp: , Rfl:     meclizine (ANTIVERT) 12 5 MG tablet, Take 1 tablet (12 5 mg total) by mouth every 8 (eight) hours as needed for dizziness, Disp: 30 tablet, Rfl: 0    Multiple Vitamin (DAILY VITAMINS PO), Take 1 tablet by mouth daily, Disp: , Rfl:     OMEGA-3 FATTY ACIDS-VITAMIN E PO, Take 1,000 mg by mouth daily , Disp: , Rfl:     pyridoxine (B-6) 100 MG tablet, One tablet daily, Disp: , Rfl:     Turmeric 500 MG CAPS, Take 1 tablet by mouth daily , Disp: , Rfl:     diphenhydrAMINE HCl (BENADRYL ALLERGY PO), Take by mouth, Disp: , Rfl:     valACYclovir (VALTREX) 1,000 mg tablet, Take 1 tablet (1,000 mg total) by mouth 2 (two) times a day for 1 day, Disp: 4 tablet, Rfl: 1

## 2020-09-29 ENCOUNTER — IMMUNIZATIONS (OUTPATIENT)
Dept: FAMILY MEDICINE CLINIC | Facility: CLINIC | Age: 50
End: 2020-09-29
Payer: COMMERCIAL

## 2020-09-29 ENCOUNTER — TELEPHONE (OUTPATIENT)
Dept: FAMILY MEDICINE CLINIC | Facility: CLINIC | Age: 50
End: 2020-09-29

## 2020-09-29 DIAGNOSIS — B00.1 COLD SORE: ICD-10-CM

## 2020-09-29 DIAGNOSIS — Z23 ENCOUNTER FOR IMMUNIZATION: ICD-10-CM

## 2020-09-29 PROCEDURE — 90471 IMMUNIZATION ADMIN: CPT

## 2020-09-29 PROCEDURE — 90682 RIV4 VACC RECOMBINANT DNA IM: CPT

## 2020-09-29 RX ORDER — VALACYCLOVIR HYDROCHLORIDE 1 G/1
1000 TABLET, FILM COATED ORAL 2 TIMES DAILY
Qty: 4 TABLET | Refills: 1 | Status: SHIPPED | OUTPATIENT
Start: 2020-09-29

## 2020-10-27 ENCOUNTER — TRANSCRIBE ORDERS (OUTPATIENT)
Dept: ADMINISTRATIVE | Facility: HOSPITAL | Age: 50
End: 2020-10-27

## 2020-10-27 ENCOUNTER — LAB (OUTPATIENT)
Dept: LAB | Age: 50
End: 2020-10-27
Payer: COMMERCIAL

## 2020-10-27 DIAGNOSIS — Z85.850 HISTORY OF THYROID CANCER: ICD-10-CM

## 2020-10-27 DIAGNOSIS — E89.0 POSTSURGICAL HYPOTHYROIDISM: Primary | ICD-10-CM

## 2020-10-27 DIAGNOSIS — E89.0 POSTSURGICAL HYPOTHYROIDISM: ICD-10-CM

## 2020-10-27 LAB
T4 FREE SERPL-MCNC: 1.34 NG/DL (ref 0.76–1.46)
TSH SERPL DL<=0.05 MIU/L-ACNC: 0.06 UIU/ML (ref 0.36–3.74)

## 2020-10-27 PROCEDURE — 36415 COLL VENOUS BLD VENIPUNCTURE: CPT

## 2020-10-27 PROCEDURE — 84443 ASSAY THYROID STIM HORMONE: CPT

## 2020-10-27 PROCEDURE — 86800 THYROGLOBULIN ANTIBODY: CPT

## 2020-10-27 PROCEDURE — 84439 ASSAY OF FREE THYROXINE: CPT

## 2020-10-27 PROCEDURE — 84432 ASSAY OF THYROGLOBULIN: CPT

## 2020-10-28 LAB
THYROGLOB AB SERPL-ACNC: <1 IU/ML (ref 0–0.9)
THYROGLOB SERPL-MCNC: <0.1 NG/ML (ref 1.5–38.5)

## 2020-11-02 ENCOUNTER — TELEPHONE (OUTPATIENT)
Dept: FAMILY MEDICINE CLINIC | Facility: CLINIC | Age: 50
End: 2020-11-02

## 2020-11-05 ENCOUNTER — OFFICE VISIT (OUTPATIENT)
Dept: FAMILY MEDICINE CLINIC | Facility: CLINIC | Age: 50
End: 2020-11-05
Payer: COMMERCIAL

## 2020-11-05 VITALS
HEART RATE: 109 BPM | BODY MASS INDEX: 29.96 KG/M2 | HEIGHT: 65 IN | SYSTOLIC BLOOD PRESSURE: 102 MMHG | TEMPERATURE: 97.3 F | WEIGHT: 179.8 LBS | DIASTOLIC BLOOD PRESSURE: 70 MMHG | OXYGEN SATURATION: 96 %

## 2020-11-05 DIAGNOSIS — M79.604 PAIN IN BOTH LOWER EXTREMITIES: ICD-10-CM

## 2020-11-05 DIAGNOSIS — G89.29 CHRONIC RIGHT-SIDED LOW BACK PAIN, UNSPECIFIED WHETHER SCIATICA PRESENT: Primary | ICD-10-CM

## 2020-11-05 DIAGNOSIS — G89.29 CHRONIC RIGHT SI JOINT PAIN: ICD-10-CM

## 2020-11-05 DIAGNOSIS — M54.50 CHRONIC RIGHT-SIDED LOW BACK PAIN, UNSPECIFIED WHETHER SCIATICA PRESENT: Primary | ICD-10-CM

## 2020-11-05 DIAGNOSIS — M79.605 PAIN IN BOTH LOWER EXTREMITIES: ICD-10-CM

## 2020-11-05 DIAGNOSIS — M53.3 CHRONIC RIGHT SI JOINT PAIN: ICD-10-CM

## 2020-11-05 PROCEDURE — 3725F SCREEN DEPRESSION PERFORMED: CPT | Performed by: FAMILY MEDICINE

## 2020-11-05 PROCEDURE — 3008F BODY MASS INDEX DOCD: CPT | Performed by: FAMILY MEDICINE

## 2020-11-05 PROCEDURE — 99213 OFFICE O/P EST LOW 20 MIN: CPT | Performed by: FAMILY MEDICINE

## 2020-11-05 PROCEDURE — 1036F TOBACCO NON-USER: CPT | Performed by: FAMILY MEDICINE

## 2020-11-05 RX ORDER — LEVOTHYROXINE SODIUM 88 UG/1
88 TABLET ORAL DAILY
COMMUNITY
Start: 2020-10-30 | End: 2021-10-19

## 2021-01-12 ENCOUNTER — VBI (OUTPATIENT)
Dept: ADMINISTRATIVE | Facility: OTHER | Age: 51
End: 2021-01-12

## 2021-04-14 ENCOUNTER — VBI (OUTPATIENT)
Dept: ADMINISTRATIVE | Facility: OTHER | Age: 51
End: 2021-04-14

## 2021-04-26 ENCOUNTER — TELEPHONE (OUTPATIENT)
Dept: FAMILY MEDICINE CLINIC | Facility: CLINIC | Age: 51
End: 2021-04-26

## 2021-04-26 NOTE — TELEPHONE ENCOUNTER
Patient is calling has a question about the COVID vaccine  Someone who she was in contact with recently tested positive for COVID, but she does not have any symptoms  She is wondering if she should get the shot at all, she knows it does not prevent you from getting it, she knows it lessens your symptoms  She wants it know the case of when she might get it  She is worried her medical history, what kind of side effects do you have if you have any especially with her med history  She is also asking is she okay to get the shot now or should she wait since she was exposed  And which one should she get? Please advise for patient so she feels safer with your opinion

## 2021-04-26 NOTE — TELEPHONE ENCOUNTER
Spoke w/ pt and gave recommendations  Pt is asking which vaccine she should get based on her medical conditions  Pt also states that she was 6 feet for more then 15 minutes while they were both wearing masks  Please advise   thanks

## 2021-04-26 NOTE — TELEPHONE ENCOUNTER
Tell her I would highly recommend getting the shot as it can help prevent or lessen the course if she were to catch it  Can we find out more about the direct contact period was she within 6 ft of the person for greater than 15 minutes with both of their mask off?

## 2021-04-27 NOTE — TELEPHONE ENCOUNTER
If they both had their mask on then she technically does not need to do anything more from a quarantine standpoint  If she develops symptoms at the separate story    I prefer the 2 dose vaccine either pfizer or moderna

## 2021-05-04 ENCOUNTER — TELEPHONE (OUTPATIENT)
Dept: FAMILY MEDICINE CLINIC | Facility: CLINIC | Age: 51
End: 2021-05-04

## 2021-05-04 ENCOUNTER — TELEMEDICINE (OUTPATIENT)
Dept: FAMILY MEDICINE CLINIC | Facility: CLINIC | Age: 51
End: 2021-05-04
Payer: COMMERCIAL

## 2021-05-04 DIAGNOSIS — B34.9 VIRAL INFECTION, UNSPECIFIED: Primary | ICD-10-CM

## 2021-05-04 DIAGNOSIS — B34.9 VIRAL INFECTION, UNSPECIFIED: ICD-10-CM

## 2021-05-04 DIAGNOSIS — J01.90 ACUTE NON-RECURRENT SINUSITIS, UNSPECIFIED LOCATION: ICD-10-CM

## 2021-05-04 PROCEDURE — U0003 INFECTIOUS AGENT DETECTION BY NUCLEIC ACID (DNA OR RNA); SEVERE ACUTE RESPIRATORY SYNDROME CORONAVIRUS 2 (SARS-COV-2) (CORONAVIRUS DISEASE [COVID-19]), AMPLIFIED PROBE TECHNIQUE, MAKING USE OF HIGH THROUGHPUT TECHNOLOGIES AS DESCRIBED BY CMS-2020-01-R: HCPCS | Performed by: NURSE PRACTITIONER

## 2021-05-04 PROCEDURE — 99213 OFFICE O/P EST LOW 20 MIN: CPT | Performed by: NURSE PRACTITIONER

## 2021-05-04 PROCEDURE — U0005 INFEC AGEN DETEC AMPLI PROBE: HCPCS | Performed by: NURSE PRACTITIONER

## 2021-05-04 RX ORDER — AMOXICILLIN AND CLAVULANATE POTASSIUM 875; 125 MG/1; MG/1
1 TABLET, FILM COATED ORAL EVERY 12 HOURS SCHEDULED
Qty: 14 TABLET | Refills: 0 | Status: SHIPPED | OUTPATIENT
Start: 2021-05-04 | End: 2021-05-11

## 2021-05-04 NOTE — PROGRESS NOTES
COVID-19 Outpatient Progress Note    Assessment/Plan:    Problem List Items Addressed This Visit     None      Visit Diagnoses     Viral infection, unspecified    -  Primary    Relevant Orders    Novel Coronavirus (Covid-19),PCR [facility] - Collected at Mobile V[facility] or [facility]    Acute non-recurrent sinusitis, unspecified location        Relevant Medications    amoxicillin-clavulanate (AUGMENTIN) 875-125 mg per tablet         Disposition:     I referred patient to one of our centralized sites for a COVID-19 swab  Advised patient to stay in isolation (not leaving home unless to seek urgent medical care) until results discussed with patient with further instruction  Discussed important of wearing mask around household members, avoiding contact with household members and cleaning surfaces frequently  Discussed important of monitoring temperature and symptoms  Call if any changes or worsening in symptoms, especially any changes with respiratory related symptoms  Please call the office if you are experiencing any worsening of symptoms or no symptom improvement  Start antibiotic, this is Augmentin, this is one pill twice a day for 7 days  Please take this medication with food as it may upset your stomach  Please review hand out on medication from pharmacy and call if you experience any side effects or have any questions  Continue with increased fluids, nasal lavage, mucinex PRN    I have spent 20 minutes directly with the patient  Greater than 50% of this time was spent in counseling/coordination of care regarding: instructions for management, patient and family education, importance of treatment compliance, risk factor reductions and impressions          Encounter provider Kathie Pabon    Provider located at [address] [place] [address] [address] [address]  [phone]    Recent Visits  No visits were found meeting these conditions  Showing recent visits within past 7 days and meeting all other requirements     Today's Visits  Date Type Provider Dept   05/04/21 Telemedicine Todd Manrique, [address] Primary Care   Showing today's visits and meeting all other requirements     Future Appointments  No visits were found meeting these conditions  Showing future appointments within next 150 days and meeting all other requirements      This virtual check-in was done via [employer] and patient was informed that this is a secure, HIPAA-compliant platform  She agrees to proceed  Patient agrees to participate in a virtual check in via telephone or video visit instead of presenting to the office to address urgent/immediate medical needs  Patient is aware this is a billable service  After connecting through [facility], the patient was identified by name and date of birth  Gerald Suárez was informed that this was a telemedicine visit and that the exam was being conducted confidentially over secure lines  My office door was closed  No one else was in the room  Gerald Suárez acknowledged consent and understanding of privacy and security of the telemedicine visit  I informed the patient that I have reviewed her record in Epic and presented the opportunity for her to ask any questions regarding the visit today  The patient agreed to participate  Subjective:   Gerald Suárez is a 44 y o  female who is concerned about COVID-19  Patient's symptoms include nasal congestion, rhinorrhea, cough (mild), chest tightness and headache  Patient denies fever, chills, fatigue, malaise, sore throat, anosmia, loss of taste, shortness of breath, abdominal pain, nausea, vomiting, diarrhea and myalgias       Date of symptom onset: 5/1/2021    Exposure:   Contact with a person who is under investigation (PUI) for or who is positive for COVID-19 within the last 14 days?: Yes    Hospitalized recently for fever and/or lower respiratory symptoms?: No      Currently a healthcare worker that is involved in direct patient care?: No      Works in a special setting where the risk of COVID-19 transmission may be high? (this may include long-term care, correctional and prison facilities; homeless shelters; assisted-living facilities and group homes ): No      Resident in a special setting where the risk of COVID-19 transmission may be high? (this may include long-term care, correctional and prison facilities; homeless shelters; assisted-living facilities and group homes ): No      Has been using nasal lavage and getting a lot of mucous out  Did have exposure to COVID in past 14 days, on 4/22/21  She was always with her mask and wasn't around them for > 15 minutes and was always > 6 feet apart  They do share the same general area  She is not vaccinated  She took mucinex DM for the night to sleep  No results found for: [facility], [address] [place] [address], [address] [place] [address],[address], [place] [address]  Past Medical History:   Diagnosis Date    Cervical neuritis 05/16/2013    C7- T1 Left, Last assessed    Endometriosis, cervix     GERD (gastroesophageal reflux disease)     Herpes     Hiatal hernia     R INGUINAL    Hypothyroidism     Thyroid cancer [facility])     THYROID     Past Surgical History:   Procedure Laterality Date    COLONOSCOPY      2015  Anastasiya  2020   Rafaela Diones HERNIA REPAIR      HYSTERECTOMY  2005    has ovaries    [place] LAP,CHOLECYSTECTOMY N/A 5/23/2019    Procedure: DEMETRA DEGROOT , IOCG;  Surgeon: Alec Rodriguez MD;  Location: [facility];  Service: General    THYROIDECTOMY      early 2008 [facility]    TUBAL LIGATION       Current Outpatient Medications   Medication Sig Dispense Refill    amoxicillin-clavulanate (AUGMENTIN) 875-125 mg per tablet Take 1 tablet by mouth every 12 (twelve) hours for 7 days 14 tablet 0    Ascorbic Acid (VITAMIN C GUMMIE PO) Take by mouth daily      ascorbic acid (VITAMIN C) 1000 MG tablet Take 1,000 mg by mouth daily      Calcium Citrate 200 MG TABS One tablet daily      cholecalciferol (VITAMIN D3) 1,000 units tablet Take 2,000 Units by mouth daily       Cyanocobalamin ER (HM VITAMIN B12) 1000 MCG TBCR Take 1 tablet by mouth daily       diphenhydrAMINE HCl (BENADRYL ALLERGY PO) Take by mouth      Flaxseed Oil OIL Take 1 capsule by mouth daily       Glucos-Chondroit-Collag-Hyal (GLUCOSAMINE CHONDROIT-COLLAGEN PO) One tablet daily      levothyroxine 112 mcg tablet Take 112 mcg by mouth       levothyroxine 88 mcg tablet Take 88 mcg by mouth daily      loratadine (CLARITIN) 10 mg tablet Take 10 mg by mouth daily      Lysine 1000 MG TABS Take 1,000 mg by mouth daily       Magnesium 400 MG TABS Take 1 tablet by mouth daily       Multiple Vitamin (DAILY VITAMINS PO) Take 1 tablet by mouth daily      OMEGA-3 FATTY ACIDS-VITAMIN E PO Take 1,000 mg by mouth daily       pyridoxine (B-6) 100 MG tablet One tablet daily      Turmeric 500 MG CAPS Take 1 tablet by mouth daily       valACYclovir (VALTREX) 1,000 mg tablet Take 1 tablet (1,000 mg total) by mouth 2 (two) times a day for 1 day 4 tablet 1     No current facility-administered medications for this visit  Allergies   Allergen Reactions    Bee Venom Anaphylaxis    Dust Mite Extract     Gabapentin Dizziness     Annotation - [id number]: "felt like out of body experience"    Molds & Smuts Sneezing    Other      Annotation - [id number]: seasonal, Trees    Pollen Extract     Bernard Grass Pollen Allergen     Latex Rash       Review of Systems   Constitutional: Positive for appetite change (poor appetite)  Negative for chills, fatigue and fever  HENT: Positive for congestion, rhinorrhea, sinus pressure and sinus pain  Negative for ear pain (ears clogged) and sore throat  Respiratory: Positive for cough (mild) and chest tightness  Negative for shortness of breath  Gastrointestinal: Negative for abdominal pain, diarrhea, nausea and vomiting     Musculoskeletal: Negative for myalgias  Neurological: Positive for headaches  Objective: There were no vitals filed for this visit  Physical Exam  Constitutional:       General: She is not in acute distress  Appearance: Normal appearance  She is not ill-appearing, toxic-appearing or diaphoretic  HENT:      Head: Normocephalic and atraumatic  Nose: Nose normal    Pulmonary:      Effort: Pulmonary effort is normal  No respiratory distress  Skin:     Coloration: Skin is not pale  Neurological:      General: No focal deficit present  Mental Status: She is alert and oriented to person, place, and time  Psychiatric:         Mood and Affect: Mood normal        VIRTUAL VISIT Justin Diaz 89 acknowledges that she has consented to an online visit or consultation  She understands that the online visit is based solely on information provided by her, and that, in the absence of a face-to-face physical evaluation by the physician, the diagnosis she receives is both limited and provisional in terms of accuracy and completeness  This is not intended to replace a full medical face-to-face evaluation by the physician  Salima Langford understands and accepts these terms

## 2021-05-04 NOTE — TELEPHONE ENCOUNTER
Patient wants to know if the antibiotic she was prescribed today will interfere with any allergy medication  Please advise patient at [phone]

## 2021-05-05 ENCOUNTER — TELEMEDICINE (OUTPATIENT)
Dept: FAMILY MEDICINE CLINIC | Facility: CLINIC | Age: 51
End: 2021-05-05
Payer: COMMERCIAL

## 2021-05-05 ENCOUNTER — TELEPHONE (OUTPATIENT)
Dept: FAMILY MEDICINE CLINIC | Facility: CLINIC | Age: 51
End: 2021-05-05

## 2021-05-05 DIAGNOSIS — U07.1 COVID-19 VIRUS INFECTION: Primary | ICD-10-CM

## 2021-05-05 LAB — SARS-COV-2 RNA RESP QL NAA+PROBE: POSITIVE

## 2021-05-05 PROCEDURE — 99213 OFFICE O/P EST LOW 20 MIN: CPT | Performed by: NURSE PRACTITIONER

## 2021-05-05 NOTE — PROGRESS NOTES
COVID-19 Outpatient Progress Note    Assessment/Plan:    Problem List Items Addressed This Visit     None         Disposition:     I recommended continued isolation until at least 24 hours have passed since recovery defined as resolution of fever without the use of fever-reducing medications AND improvement in COVID symptoms AND 10 days have passed since onset of symptoms (or 10 days have passed since date of first positive viral diagnostic test for asymptomatic patients)  5/12 21 earliest d/c isolation date  Continue with antibiotic  Encouraged vitamin c, vitamin d, and zinc    Recheck early next week  Answered all patient questions regarding isolation/ symptom management  Advised when to call  Monitor symptoms closely  I have spent 20 minutes directly with the patient  Greater than 50% of this time was spent in counseling/coordination of care regarding: diagnostic results, prognosis, risks and benefits of treatment options, instructions for management, patient and family education, importance of treatment compliance, risk factor reductions and impressions  Encounter provider Janee De La O [place]    Provider located at [address] [place] [address] [address] & [facility] [address] [phone]    Recent Visits  Date Type Provider Dept   05/04/21 Telephone 931 [facility] Primary Care   05/04/21 Telemedicine Janee De La O, [address] [place] [address] Primary Care   Showing recent visits within past 7 days and meeting all other requirements     Today's Visits  Date Type Provider Dept   05/05/21 Telephone Luke Ireland P[facility] Primary Care   Showing today's visits and meeting all other requirements     Future Appointments  No visits were found meeting these conditions     Showing future appointments within next 150 days and meeting all other requirements      This virtual check-in was done via [employer] and patient was informed that this is a secure, HIPAA-compliant platform  She agrees to proceed  Patient agrees to participate in a virtual check in via telephone or video visit instead of presenting to the office to address urgent/immediate medical needs  Patient is aware this is a billable service  After connecting through [facility], the patient was identified by name and date of birth  Ollie Colbert was informed that this was a telemedicine visit and that the exam was being conducted confidentially over secure lines  My office door was closed  No one else was in the room  Ollie Colbert acknowledged consent and understanding of privacy and security of the telemedicine visit  I informed the patient that I have reviewed her record in Epic and presented the opportunity for her to ask any questions regarding the visit today  The patient agreed to participate  Subjective:   Ollie Colbert is a 44 y o  female who has been screened for COVID-19  Symptom change since last report: unchanged  Patient's symptoms include nasal congestion, rhinorrhea, anosmia, loss of taste, cough (mild), chest tightness and headache  Patient denies fever, chills, fatigue, malaise, sore throat, shortness of breath, abdominal pain, nausea, vomiting, diarrhea and myalgias  Carlita Li has been staying home and has isolated themselves in her home  She is taking care to not share personal items and is cleaning all surfaces that are touched often, like counters, tabletops, and doorknobs using household cleaning sprays or wipes  She is wearing a mask when she leaves her room       Date of symptom onset: 5/1/2021  Date of positive COVID-19 PCR: 5/4/2021    Exposure:   Contact with a person who is under investigation (PUI) for or who is positive for COVID-19 within the last 14 days?: Yes    Hospitalized recently for fever and/or lower respiratory symptoms?: No      Currently a healthcare worker that is involved in direct patient care?: No Works in a special setting where the risk of COVID-19 transmission may be high? (this may include long-term care, correctional and senior living facilities; homeless shelters; assisted-living facilities and group homes ): No      Resident in a special setting where the risk of COVID-19 transmission may be high? (this may include long-term care, correctional and senior living facilities; homeless shelters; assisted-living facilities and group homes ): No      Runny nose improved  Taking the antibiotic and mucinex DM at night which has been helpful  Was able to lay down to sleep last night  Denies SOB  Denies CP  Has some tightness/ congestion  Lab Results   Component Value Date    SARSCOV2 Positive (A) 05/04/2021     Past Medical History:   Diagnosis Date    Cervical neuritis 05/16/2013    C7- T1 Left, Last assessed    Endometriosis, cervix     GERD (gastroesophageal reflux disease)     Herpes     Hiatal hernia     R INGUINAL    Hypothyroidism     Thyroid cancer [facility])     THYROID     Past Surgical History:   Procedure Laterality Date    COLONOSCOPY      2015  Anastasiya  2020   [facility] HERNIA REPAIR      HYSTERECTOMY  2005    has ovaries    ME LAP,CHOLECYSTECTOMY N/A 5/23/2019    Procedure: DEMETRA DEGROOT , IOCG;  Surgeon: Yola Gusman MD;  [facility];  Service: General    THYROIDECTOMY      early 2008 [facility]    TUBAL LIGATION       Current Outpatient Medications   Medication Sig Dispense Refill    amoxicillin-clavulanate (AUGMENTIN) 875-125 mg per tablet Take 1 tablet by mouth every 12 (twelve) hours for 7 days 14 tablet 0    Ascorbic Acid (VITAMIN C GUMMIE PO) Take by mouth daily      ascorbic acid (VITAMIN C) 1000 MG tablet Take 1,000 mg by mouth daily      Calcium Citrate 200 MG TABS One tablet daily      cholecalciferol (VITAMIN D3) 1,000 units tablet Take 2,000 Units by mouth daily       Cyanocobalamin ER (HM VITAMIN B12) 1000 MCG TBCR Take 1 tablet by mouth daily       diphenhydrAMINE HCl (BENADRYL ALLERGY PO) Take by mouth      Flaxseed Oil OIL Take 1 capsule by mouth daily       Glucos-Chondroit-Collag-Hyal (GLUCOSAMINE CHONDROIT-COLLAGEN PO) One tablet daily      levothyroxine 112 mcg tablet Take 112 mcg by mouth       levothyroxine 88 mcg tablet Take 88 mcg by mouth daily      loratadine (CLARITIN) 10 mg tablet Take 10 mg by mouth daily      Lysine 1000 MG TABS Take 1,000 mg by mouth daily       Magnesium 400 MG TABS Take 1 tablet by mouth daily       Multiple Vitamin (DAILY VITAMINS PO) Take 1 tablet by mouth daily      OMEGA-3 FATTY ACIDS-VITAMIN E PO Take 1,000 mg by mouth daily       pyridoxine (B-6) 100 MG tablet One tablet daily      Turmeric 500 MG CAPS Take 1 tablet by mouth daily       valACYclovir (VALTREX) 1,000 mg tablet Take 1 tablet (1,000 mg total) by mouth 2 (two) times a day for 1 day 4 tablet 1     No current facility-administered medications for this visit  Allergies   Allergen Reactions    Bee Venom Anaphylaxis    Dust Mite Extract     Gabapentin Dizziness     Annotation - [id number]: "felt like out of body experience"    Molds & Smuts Sneezing    Other      Annotation - [id number]: seasonal, Trees    Pollen Extract     Bernard Grass Pollen Allergen     Latex Rash       Review of Systems   Constitutional: Positive for appetite change (poor appetite)  Negative for chills, fatigue and fever  HENT: Positive for congestion, rhinorrhea, sinus pressure and sinus pain  Negative for ear pain (ears clogged) and sore throat  Respiratory: Positive for cough (mild) and chest tightness  Negative for shortness of breath  Gastrointestinal: Negative for abdominal pain, diarrhea, nausea and vomiting  Musculoskeletal: Negative for myalgias  Neurological: Positive for headaches  Objective: There were no vitals filed for this visit  Physical Exam  Constitutional:       General: She is not in acute distress  Appearance: Normal appearance   She is not ill-appearing, toxic-appearing or diaphoretic  HENT:      Head: Normocephalic and atraumatic  Nose: Nose normal    Pulmonary:      Effort: Pulmonary effort is normal  No respiratory distress  Skin:     Coloration: Skin is not pale  Neurological:      General: No focal deficit present  Mental Status: She is alert and oriented to person, place, and time  Psychiatric:         Mood and Affect: Mood normal        VIRTUAL VISIT Justin Diaz 105 acknowledges that she has consented to an online visit or consultation  She understands that the online visit is based solely on information provided by her, and that, in the absence of a face-to-face physical evaluation by the physician, the diagnosis she receives is both limited and provisional in terms of accuracy and completeness  This is not intended to replace a full medical face-to-face evaluation by the physician  Yvonne Stephenson understands and accepts these terms

## 2021-05-05 NOTE — TELEPHONE ENCOUNTER
----- Message from [address] sent at 5/5/2021 11:01 AM EDT -----  covid positive   Needs virtual f/u

## 2021-05-07 ENCOUNTER — TELEPHONE (OUTPATIENT)
Dept: FAMILY MEDICINE CLINIC | Facility: CLINIC | Age: 51
End: 2021-05-07

## 2021-05-07 DIAGNOSIS — H10.30 ACUTE BACTERIAL CONJUNCTIVITIS, UNSPECIFIED LATERALITY: Primary | ICD-10-CM

## 2021-05-07 RX ORDER — TOBRAMYCIN 3 MG/ML
SOLUTION/ DROPS OPHTHALMIC
Qty: 5 ML | Refills: 0 | Status: SHIPPED | OUTPATIENT
Start: 2021-05-07 | End: 2021-09-26

## 2021-05-07 NOTE — TELEPHONE ENCOUNTER
Tell the patient I sent in antibiotic drops for possible conjunctivitis to [facility]

## 2021-05-07 NOTE — TELEPHONE ENCOUNTER
FYI, patient called again to disclose that she was nauseous and had a loose bowel movement this afternoon  She ate a sandwich and that made her feel better  She is drinking water and Gatorade  Patient will monitor

## 2021-05-10 ENCOUNTER — TELEMEDICINE (OUTPATIENT)
Dept: FAMILY MEDICINE CLINIC | Facility: CLINIC | Age: 51
End: 2021-05-10
Payer: COMMERCIAL

## 2021-05-10 ENCOUNTER — TELEPHONE (OUTPATIENT)
Dept: FAMILY MEDICINE CLINIC | Facility: CLINIC | Age: 51
End: 2021-05-10

## 2021-05-10 DIAGNOSIS — U07.1 COVID-19 VIRUS INFECTION: Primary | ICD-10-CM

## 2021-05-10 PROCEDURE — 99213 OFFICE O/P EST LOW 20 MIN: CPT | Performed by: NURSE PRACTITIONER

## 2021-05-10 NOTE — TELEPHONE ENCOUNTER
Juan Jose Moody called and asked if she is aloud to use her sinus irrigation unit  Please advise  Thank you

## 2021-05-10 NOTE — PROGRESS NOTES
COVID-19 Outpatient Progress Note    Assessment/Plan:    Problem List Items Addressed This Visit     None      Visit Diagnoses     COVID-19 virus infection    -  Primary         Disposition:     I recommended continued isolation until at least 24 hours have passed since recovery defined as resolution of fever without the use of fever-reducing medications AND improvement in COVID symptoms AND 10 days have passed since onset of symptoms (or 10 days have passed since date of first positive viral diagnostic test for asymptomatic patients)  5/12/21 earliest d/c isolation date  Continue with antibiotic  Encouraged vitamin c, vitamin d, and zinc    Answered all patient questions regarding isolation/ symptom management  Advised when to call  Monitor symptoms closely  Complete eye drops  I have spent 20 minutes directly with the patient  Greater than 50% of this time was spent in counseling/coordination of care regarding: diagnostic results, prognosis, risks and benefits of treatment options, instructions for management, patient and family education, importance of treatment compliance, risk factor reductions and impressions          Encounter provider Emanuel Palacio    Provider located at [address] [place] [address] PRIMARY CARE  [address] [facility] [address] [address]  [phone]    Recent Visits  Date Type Provider Dept   05/07/21 Telephone Yudi Macario [facility] Primary Care   05/05/21 Telephone Resuki Macario [facility] Primary Care   05/05/21 Telemedicine Chata Even, [address] [facility] Primary Care   05/04/21 Telephone [address] [facility] Primary Care   05/04/21 Telemedicine Chata Even, [facility] [employer] Primary Care   Showing recent visits within past 7 days and meeting all other requirements     Today's Visits  Date Type Provider Dept   05/10/21 Telemedicine [employer] Even, [facility] Primary Care   Showing today's visits and meeting all other requirements     Future Appointments  No visits were found meeting these conditions  Showing future appointments within next 150 days and meeting all other requirements      This virtual check-in was done via [employer] and patient was informed that this is a secure, HIPAA-compliant platform  She agrees to proceed  Patient agrees to participate in a virtual check in via telephone or video visit instead of presenting to the office to address urgent/immediate medical needs  Patient is aware this is a billable service  After connecting through [facility], the patient was identified by name and date of birth  Mary Alice Morin was informed that this was a telemedicine visit and that the exam was being conducted confidentially over secure lines  My office door was closed  No one else was in the room  Mary Alice Morin acknowledged consent and understanding of privacy and security of the telemedicine visit  I informed the patient that I have reviewed her record in Epic and presented the opportunity for her to ask any questions regarding the visit today  The patient agreed to participate  Subjective:   Mary Alice Morin is a 44 y o  female who has been screened for COVID-19  Symptom change since last report: unchanged  Patient's symptoms include fatigue, rhinorrhea, loss of taste and cough  Patient denies fever, chills, malaise, congestion, sore throat, anosmia, shortness of breath, chest tightness, abdominal pain, nausea, vomiting, diarrhea, myalgias and headaches  Mckay Wells has been staying home and has isolated themselves in her home  She is taking care to not share personal items and is cleaning all surfaces that are touched often, like counters, tabletops, and doorknobs using household cleaning sprays or wipes  She is wearing a mask when she leaves her room       Date of symptom onset: 5/1/2021  Date of positive COVID-19 PCR: 5/4/2021    Exposure:   Contact with a person who is under investigation (PUI) for or who is positive for COVID-19 within the last 14 days?: Yes    Hospitalized recently for fever and/or lower respiratory symptoms?: No      Currently a healthcare worker that is involved in direct patient care?: No      Works in a special setting where the risk of COVID-19 transmission may be high? (this may include long-term care, correctional and FCI facilities; homeless shelters; assisted-living facilities and group homes ): No      Resident in a special setting where the risk of COVID-19 transmission may be high? (this may include long-term care, correctional and [facility]CI facilities; homeless shelters; assisted-living facilities and group homes ): No      Taking mucinex DM at bedtime, cough drops, and antibiotics for secondary sinus infection  Started Tobrex drops last week for left pink eyes  Lab Results   Component Value Date    SARSCOV2 Positive (A) 05/04/2021     Past Medical History:   Diagnosis Date    Cervical neuritis 05/16/2013    C7- T1 Left, Last assessed    Endometriosis, cervix     GERD (gastroesophageal reflux disease)     Herpes     Hiatal hernia     R INGUINAL    Hypothyroidism     Thyroid cancer [facility])     THYROID     Past Surgical History:   Procedure Laterality Date    COLONOSCOPY      2015  Anastasiya  2020   [facility] HERNIA REPAIR      HYSTERECTOMY  2005    has ovaries    MA LAP,CHOLECYSTECTOMY N/A 5/23/2019    Procedure: DEMETRA DEGROOT , IOCG;  Surgeon: Aniceto Blanton MD;  Location: [facility];  Service: General    THYROIDECTOMY      early 2008 [facility]    TUBAL LIGATION       Current Outpatient Medications   Medication Sig Dispense Refill    amoxicillin-clavulanate (AUGMENTIN) 875-125 mg per tablet Take 1 tablet by mouth every 12 (twelve) hours for 7 days 14 tablet 0    Ascorbic Acid (VITAMIN C GUMMIE PO) Take by mouth daily      ascorbic acid (VITAMIN C) 1000 MG tablet Take 1,000 mg by mouth daily      Calcium Citrate 200 MG TABS One tablet daily      cholecalciferol (VITAMIN D3) 1,000 units tablet Take 2,000 Units by mouth daily       Cyanocobalamin ER (HM VITAMIN B12) 1000 MCG TBCR Take 1 tablet by mouth daily       diphenhydrAMINE HCl (BENADRYL ALLERGY PO) Take by mouth      Flaxseed Oil OIL Take 1 capsule by mouth daily       Glucos-Chondroit-Collag-Hyal (GLUCOSAMINE CHONDROIT-COLLAGEN PO) One tablet daily      levothyroxine 112 mcg tablet Take 112 mcg by mouth       levothyroxine 88 mcg tablet Take 88 mcg by mouth daily      loratadine (CLARITIN) 10 mg tablet Take 10 mg by mouth daily      Lysine 1000 MG TABS Take 1,000 mg by mouth daily       Magnesium 400 MG TABS Take 1 tablet by mouth daily       Multiple Vitamin (DAILY VITAMINS PO) Take 1 tablet by mouth daily      OMEGA-3 FATTY ACIDS-VITAMIN E PO Take 1,000 mg by mouth daily       pyridoxine (B-6) 100 MG tablet One tablet daily      tobramycin (TOBREX) 0 3 % SOLN Place 1 drop in the affected eye q 4 hours while awake for 5-7 days  5 mL 0    Turmeric 500 MG CAPS Take 1 tablet by mouth daily       valACYclovir (VALTREX) 1,000 mg tablet Take 1 tablet (1,000 mg total) by mouth 2 (two) times a day for 1 day 4 tablet 1     No current facility-administered medications for this visit  Allergies   Allergen Reactions    Bee Venom Anaphylaxis    Dust Mite Extract     Gabapentin Dizziness     Annotation - [id number]: "felt like out of body experience"    Molds & Smuts Sneezing    Other      Annotation - [id number]: seasonal, Trees    Pollen Extract     Bernard Grass Pollen Allergen     Latex Rash       Review of Systems   Constitutional: Positive for fatigue  Negative for chills and fever  HENT: Positive for rhinorrhea, sinus pressure and sinus pain  Negative for congestion, ear pain (ears clogged) and sore throat  Respiratory: Positive for cough  Negative for chest tightness and shortness of breath      Gastrointestinal: Negative for abdominal pain, diarrhea, nausea and vomiting  Musculoskeletal: Negative for myalgias  Neurological: Negative for headaches  Objective: There were no vitals filed for this visit  Physical Exam  Constitutional:       General: She is not in acute distress  Appearance: Normal appearance  She is not ill-appearing, toxic-appearing or diaphoretic  HENT:      Head: Normocephalic and atraumatic  Nose: Nose normal    Pulmonary:      Effort: Pulmonary effort is normal  No respiratory distress  Skin:     Coloration: Skin is not pale  Neurological:      General: No focal deficit present  Mental Status: She is alert and oriented to person, place, and time  Psychiatric:         Mood and Affect: Mood normal        VIRTUAL VISIT Justin Diaz 105 acknowledges that she has consented to an online visit or consultation  She understands that the online visit is based solely on information provided by her, and that, in the absence of a face-to-face physical evaluation by the physician, the diagnosis she receives is both limited and provisional in terms of accuracy and completeness  This is not intended to replace a full medical face-to-face evaluation by the physician  Maikol Adame understands and accepts these terms

## 2021-05-10 NOTE — LETTER
May 10, 2021    Patient:  Nelson Aguilar  YOB: 1970  Date of Last Encounter: 5/7/2021    To whom it may concern:    Nelson Aguilar has tested positive for COVID-19 (Coronavirus)  She may return to work on 5/15/21      Sincerely,        Kalyn Anderson

## 2021-05-11 NOTE — TELEPHONE ENCOUNTER
Let the patient know that the diarrhea could be coming specifically from the antibiotic in should improve once the antibiotic is done  Symptoms from COVID are multiple  They typically can include fevers chills nausea diarrhea headache loss of taste loss of smell fatigue body aches  The beginning of isolation starts when you for start having symptoms and then again goes a minimum of 10 days but again the last 3 have to be pretty much back to normal including no fever on the last 24 hours  Some people this can go longer like 1 2 or 3 weeks  See if she has another virtual with Kwasi Delgadillo E Thursday or Friday

## 2021-05-11 NOTE — TELEPHONE ENCOUNTER
Isolation is a minimum of 10 days  We would eventually like the last 3 days to be pretty much symptom-free with the last 24 hours being fever free on no medication    Sometime isolation

## 2021-05-11 NOTE — TELEPHONE ENCOUNTER
I called and made pt aware of your message and recommendations  Should be done in a closed off room that no one is using and using disinfectant in the room after use  Pt is cornered that since she was using this ,because she thought it was a sinus infection  did she give herself COVID 19  With all the draining is it leaving droplets it in the air  Pt can try to switch the way the nose pads are used that way nothing goes into the air  Pt is basically asking if after using it will doplets be in the air? Even if she uses in an enclosed room  How long until her spouse can come back to sleep in their bed? Pt's bm was loose, but when she wiped it  was green  She is on an antibiotic (per pt the one Onel prescribed)  is this normal?   Pt does wipe down all parts of the unit and the sink/ countertop     Pt's number is [phone]

## 2021-05-11 NOTE — TELEPHONE ENCOUNTER
Patient aware of response  Patient is concerned about loose stool being green, also patient would like to know how long will symptoms be for and what are all the symptoms for COVID?? Patient would also like to know exact day isolation should be done for? Scheduled patient for virtual with Dr Wendi Castelan for 05/12/21 due to that patient would like to speak to doctor because patient states she has a lot of questions she needs answered

## 2021-05-12 ENCOUNTER — TELEMEDICINE (OUTPATIENT)
Dept: FAMILY MEDICINE CLINIC | Facility: CLINIC | Age: 51
End: 2021-05-12
Payer: COMMERCIAL

## 2021-05-12 DIAGNOSIS — U07.1 COVID-19 VIRUS INFECTION: Primary | ICD-10-CM

## 2021-05-12 DIAGNOSIS — G93.3 POSTVIRAL FATIGUE SYNDROME: ICD-10-CM

## 2021-05-12 DIAGNOSIS — E89.0 POSTOPERATIVE HYPOTHYROIDISM: ICD-10-CM

## 2021-05-12 PROCEDURE — 1036F TOBACCO NON-USER: CPT | Performed by: FAMILY MEDICINE

## 2021-05-12 PROCEDURE — 99214 OFFICE O/P EST MOD 30 MIN: CPT | Performed by: FAMILY MEDICINE

## 2021-05-12 NOTE — PROGRESS NOTES
COVID-19 Outpatient Progress Note    Assessment/Plan:  Positive COVID test reviewed  27 minutes spent today answering questions regarding COVID, diagnosis, symptoms, improvement of symptoms as well as level of contagiousness  Patient is well over 10 days and other than minor things has improved  Can return back to work on Saturday  Has already received work release to go back  Does not need to follow up again with us  Continue good nutrition hydration and exercise  Has been can now return to the bedroom as he has been and in isolation away from her  Patient up-to-date with [facility] endocrinology regarding her history of thyroid cancer  Reminded to get yearly mammography and routine gyn examinations  Overdue for colonoscopy  Problem List Items Addressed This Visit        Endocrine    Postoperative hypothyroidism      Other Visit Diagnoses     COVID-19 virus infection    -  Primary    Postviral fatigue syndrome             Disposition:     I recommended continued isolation until at least 24 hours have passed since recovery defined as resolution of fever without the use of fever-reducing medications AND improvement in COVID symptoms AND 10 days have passed since onset of symptoms (or 10 days have passed since date of first positive viral diagnostic test for asymptomatic patients)  I have spent 27 minutes directly with the patient  Greater than 50% of this time was spent in counseling/coordination of care regarding: instructions for management, patient and family education and impressions          Encounter provider Chinmay Robert DO    Provider located at [address] [place] [address] PRIMARY CARE  [address] [facility] [address] & [address] [address]  [phone]    Recent Visits  Date Type Provider Dept   05/13/21 Telephone Chinmay Robert DO [facility]   05/12/21 [address] [place] [address], [address] [place] [address] Primary Care   Showing recent visits within past 7 days and meeting all other requirements     Future Appointments  No visits were found meeting these conditions  Showing future appointments within next 150 days and meeting all other requirements      This virtual check-in was done via [employer] and patient was informed that this is a secure, HIPAA-compliant platform  She agrees to proceed  Patient agrees to participate in a virtual check in via telephone or video visit instead of presenting to the office to address urgent/immediate medical needs  Patient is aware this is a billable service  After connecting through [facility], the patient was identified by name and date of birth  Renay Garner was informed that this was a telemedicine visit and that the exam was being conducted confidentially over secure lines  My office door was closed  No one else was in the room  Renay Garner acknowledged consent and understanding of privacy and security of the telemedicine visit  I informed the patient that I have reviewed her record in Epic and presented the opportunity for her to ask any questions regarding the visit today  The patient agreed to participate  Subjective:   Renay Garner is a 44 y o  female who has been screened for COVID-19  Symptom change since last report: resolving  Patient's symptoms include fatigue, malaise, nasal congestion, rhinorrhea, diarrhea and headache  Patient denies fever, chills, sore throat, anosmia, loss of taste, cough, shortness of breath, chest tightness, abdominal pain, nausea, vomiting and myalgias  Luis Carlos Ware has been staying home and has isolated themselves in her home  She is taking care to not share personal items and is cleaning all surfaces that are touched often, like counters, tabletops, and doorknobs using household cleaning sprays or wipes  She is wearing a mask when she leaves her room       Date of symptom onset: 5/1/2021  Date of positive COVID-19 PCR: 5/4/2021    Lab Results   Component Value Date    SARSCOV2 Positive (A) 05/04/2021     Past Medical History:   Diagnosis Date    Cervical neuritis 05/16/2013    C7- T1 Left, Last assessed    Endometriosis, cervix     GERD (gastroesophageal reflux disease)     Herpes     Hiatal hernia     R INGUINAL    Hypothyroidism     Thyroid cancer [facility])     THYROID     Past Surgical History:   Procedure Laterality Date    COLONOSCOPY      2015  Anastasiya  2020   [address] [place] [address] HERNIA REPAIR      HYSTERECTOMY  2005    has ovaries    ND LAP,CHOLECYSTECTOMY N/A 5/23/2019    Procedure: LAP  ZANE , IOCG;  Surgeon: Cheyenne Savage MD;  Location[facility];  Service: General    THYROIDECTOMY      early 2008 [facility]    TUBAL LIGATION       Current Outpatient Medications   Medication Sig Dispense Refill    Ascorbic Acid (VITAMIN C GUMMIE PO) Take by mouth daily      ascorbic acid (VITAMIN C) 1000 MG tablet Take 1,000 mg by mouth daily      Calcium Citrate 200 MG TABS One tablet daily      cholecalciferol (VITAMIN D3) 1,000 units tablet Take 2,000 Units by mouth daily       Cyanocobalamin ER (HM VITAMIN B12) 1000 MCG TBCR Take 1 tablet by mouth daily       diphenhydrAMINE HCl (BENADRYL ALLERGY PO) Take by mouth      Flaxseed Oil OIL Take 1 capsule by mouth daily       Glucos-Chondroit-Collag-Hyal (GLUCOSAMINE CHONDROIT-COLLAGEN PO) One tablet daily      levothyroxine 88 mcg tablet Take 88 mcg by mouth daily      loratadine (CLARITIN) 10 mg tablet Take 10 mg by mouth daily      Lysine 1000 MG TABS Take 1,000 mg by mouth daily       Magnesium 400 MG TABS Take 1 tablet by mouth daily       Multiple Vitamin (DAILY VITAMINS PO) Take 1 tablet by mouth daily      OMEGA-3 FATTY ACIDS-VITAMIN E PO Take 1,000 mg by mouth daily       pyridoxine (B-6) 100 MG tablet One tablet daily      tobramycin (TOBREX) 0 3 % SOLN Place 1 drop in the affected eye q 4 hours while awake for 5-7 days   5 mL 0    Turmeric 500 MG CAPS Take 1 tablet by mouth daily       valACYclovir (VALTREX) 1,000 mg tablet Take 1 tablet (1,000 mg total) by mouth 2 (two) times a day for 1 day 4 tablet 1     No current facility-administered medications for this visit  Allergies   Allergen Reactions    Bee Venom Anaphylaxis    Dust Mite Extract     Gabapentin Dizziness     Annotation - [id number]: "felt like out of body experience"    Molds & Smuts Sneezing    Other      Annotation - [id number]: seasonal, Trees    Pollen Extract     Bernard Grass Pollen Allergen     Latex Rash       Review of Systems   Constitutional: Positive for fatigue  Negative for chills and fever  HENT: Positive for congestion and rhinorrhea  Negative for ear pain, sinus pressure and sore throat  Eyes: Negative for visual disturbance  Respiratory: Negative for cough, chest tightness, shortness of breath and wheezing  Cardiovascular: Negative for chest pain, palpitations and leg swelling  Gastrointestinal: Positive for diarrhea  Negative for abdominal pain, nausea and vomiting  Musculoskeletal: Negative for back pain, joint swelling and myalgias  Neurological: Positive for headaches  Negative for dizziness and light-headedness  Psychiatric/Behavioral: Negative for decreased concentration  Objective: There were no vitals filed for this visit  Physical Exam  Constitutional:       General: She is not in acute distress  Appearance: Normal appearance  She is not ill-appearing, toxic-appearing or diaphoretic  HENT:      Head: Normocephalic and atraumatic  Neck:      Comments: Negative tenderness of the cervical area on patient palpation  Pulmonary:      Effort: Pulmonary effort is normal  No respiratory distress  Neurological:      General: No focal deficit present  Mental Status: She is alert and oriented to person, place, and time  Psychiatric:         Mood and Affect: Mood normal          Behavior: Behavior normal          Thought Content:  Thought content normal        VIRTUAL VISIT Justin Rodríguez acknowledges that she has consented to an online visit or consultation  She understands that the online visit is based solely on information provided by her, and that, in the absence of a face-to-face physical evaluation by the physician, the diagnosis she receives is both limited and provisional in terms of accuracy and completeness  This is not intended to replace a full medical face-to-face evaluation by the physician  Alcira Dumont understands and accepts these terms

## 2021-05-13 ENCOUNTER — TELEPHONE (OUTPATIENT)
Dept: FAMILY MEDICINE CLINIC | Facility: CLINIC | Age: 51
End: 2021-05-13

## 2021-05-13 NOTE — TELEPHONE ENCOUNTER
Is it [facility] for her to go back to work on Saturday? Please advise patient at [phone]

## 2021-05-13 NOTE — TELEPHONE ENCOUNTER
Patient called saying she woke up with R eye being a little pink  She is wondering if she should use the drops you prescribed for her L eye  Please advise  Thank you

## 2021-05-13 NOTE — TELEPHONE ENCOUNTER
I called Pt per Dr Paige/s request to confirm whether or not she has received her work note  Pt believes it is in the portal she has not yet checked  I offered to re send it via My Chart and or fax to her employer  I will fax as pt requested  If pt does not se the letter I My chart she will call and we resend       Please fax work note to [phone]  Attention: Shannan Montoya   Pt's work note was faxed with [facility] sheet [facility] to [phone]

## 2021-07-30 ENCOUNTER — TELEPHONE (OUTPATIENT)
Dept: FAMILY MEDICINE CLINIC | Facility: CLINIC | Age: 51
End: 2021-07-30

## 2021-07-30 NOTE — TELEPHONE ENCOUNTER
Please tell the patient my recommendation would be to get the vaccine  She can get it at either [facility] or [facility]

## 2021-07-30 NOTE — TELEPHONE ENCOUNTER
Pt called the office she had COVID-19 back in May and was advised she can get the vaccine after 90 days  Pt is asking do you recommend she get the vaccine? she has heard that the virus is dead in her sytem  She  has heard different things what do you advise? Dop you prefer once vaccine over the others? Pt advised [facility] has walk in hours  Pt was asking could she go  to [facility] pt aware yes she can go wherever she can get  I advised pt  we will get back to her with your response  Please call [phone] if pt deos not answer please leave a detailed per verbal consent from Cyrus1 ZAHIRA Pedersen today 07/30/21

## 2021-08-09 ENCOUNTER — APPOINTMENT (OUTPATIENT)
Dept: RADIOLOGY | Age: 51
End: 2021-08-09
Payer: COMMERCIAL

## 2021-08-09 ENCOUNTER — OFFICE VISIT (OUTPATIENT)
Dept: URGENT CARE | Age: 51
End: 2021-08-09
Payer: COMMERCIAL

## 2021-08-09 VITALS
WEIGHT: 179 LBS | TEMPERATURE: 98 F | OXYGEN SATURATION: 100 % | HEIGHT: 65 IN | BODY MASS INDEX: 29.82 KG/M2 | DIASTOLIC BLOOD PRESSURE: 60 MMHG | SYSTOLIC BLOOD PRESSURE: 120 MMHG | HEART RATE: 80 BPM | RESPIRATION RATE: 18 BRPM

## 2021-08-09 DIAGNOSIS — M79.672 LEFT FOOT PAIN: ICD-10-CM

## 2021-08-09 DIAGNOSIS — M25.572 ACUTE LEFT ANKLE PAIN: ICD-10-CM

## 2021-08-09 DIAGNOSIS — S93.402A SPRAIN OF LEFT ANKLE, UNSPECIFIED LIGAMENT, INITIAL ENCOUNTER: ICD-10-CM

## 2021-08-09 DIAGNOSIS — M72.2 PLANTAR FASCIITIS, BILATERAL: ICD-10-CM

## 2021-08-09 DIAGNOSIS — M25.572 ACUTE LEFT ANKLE PAIN: Primary | ICD-10-CM

## 2021-08-09 PROCEDURE — 99213 OFFICE O/P EST LOW 20 MIN: CPT | Performed by: PHYSICIAN ASSISTANT

## 2021-08-09 PROCEDURE — 73630 X-RAY EXAM OF FOOT: CPT

## 2021-08-09 PROCEDURE — 73610 X-RAY EXAM OF ANKLE: CPT

## 2021-08-09 NOTE — PATIENT INSTRUCTIONS
Ibuprofen 600 mg three times daily for the next 5-7 days as needed for pain  Hold until 2 weeks after 2nd COVID  Tylenol 1,000 mg every 6-8 hours, do not exceed 4,000 mg in 24 hours  Activity as tolerated  Lace up ankle brace to left ankle as needed  Discontinue after 2 weeks  If symptoms are not improved in 1-2 weeks follow-up with podiatry  Heat and ice 20 minutes on as needed  Metatarsal pads to ball of foot just below the toes  If symptoms worsen report to the emergency room  Plantar Fasciitis   AMBULATORY CARE:   Plantar fasciitis  PF is swelling of the plantar fascia  The plantar fascia is a thick band of tissue that connects your heel bone to your toes  This part of your foot helps support the arch of your foot and absorbs shock  Tension and stress can cause small tears on the thick band of tissue  These small tears can grow larger with repeated stretching and tearing  The band of tissue can become swollen and painful  Signs and symptoms:   · Pain on the bottom of your foot near your heel    · Pain that is worse right after you get out of bed or after a long period of rest    · Pain after activity    · Stiffness in your foot    · Heel swelling    Call your doctor if:   · Your pain or swelling suddenly increase  · You develop knee, hip, or back pain  · You have questions or concerns about your condition or care  Treatment  may include any of the following:  · Medicines  may be given to decrease swelling and pain  · Shoe inserts, splints, or tape  help support your foot and decrease stress on your plantar fascia  A night splint may help stretch your plantar fascia while you sleep  · Stretches and exercises  can help decrease pain and swelling  They can also help strengthen the muscles that support your heel and foot  · Surgery  may be needed when other treatments do not work  During surgery, the plantar fascia is  from your heel      Self-care:   · Wear your splint or shoe inserts as directed  You may need to wear a splint at night to keep your foot stretched while you sleep  This will help prevent sharp pain first thing in the morning  Shoe inserts will help decrease stress on your plantar fascia when you walk or exercise  · Apply ice on your plantar fascia  Ice helps prevent tissue damage and decreases swelling and pain  Fill a water bottle with water and freeze it  Wrap a towel around the bottle or cover it with a pillow case  Roll the water bottle under your foot for 10 minutes in the morning and after work  · Massage your plantar fascia as directed  This may help decrease swelling and pain  Roll a golf ball under your foot for 10 minutes  Repeat 3 times each day  · Go to physical therapy as directed  A physical therapist teaches you exercises to help improve movement and strength, and to decrease pain  Prevent plantar fasciitis:   · Maintain a healthy weight  This will help decrease stress on your feet  Ask your healthcare provider how much you should weigh  Ask him to help you create a weight loss plan if you are overweight  · Do low-impact exercises  Low-impact exercises decrease stress on your plantar fascia  Examples include swimming or bicycling  · Start new activities slowly  Increase the intensity and time gradually  · Wear shoes that fit well and support your arch  Replace your shoes before the padding or shock absorption wears out  Do not walk or  bare feet or sandals for long periods of time  Follow up with your doctor as directed:  Write down your questions so you remember to ask them during your visits  © [employer] 2021 Information is for End User's use only and may not be sold, redistributed or otherwise used for commercial purposes   All illustrations and images included in CareNotes® are the copyrighted property of [employer]  or Rui Palomo   The above information is an educational aid only  It is not intended as medical advice for individual conditions or treatments  Talk to your doctor, nurse or pharmacist before following any medical regimen to see if it is safe and effective for you  Ankle Sprain   AMBULATORY CARE:   An ankle sprain  happens when 1 or more ligaments in your ankle joint stretch or tear  Ligaments are tough tissues that connect bones  Ligaments support your joints and keep your bones in place  Common symptoms include the following:   · Trouble moving your ankle or foot    · Pain when you touch or put weight on your ankle    · Bruised, swollen, or misshapen ankle    Seek care immediately if:   · You have severe pain in your ankle  · Your foot or toes are cold or numb  · Your ankle becomes more weak or unstable (wobbly)  · You are unable to put any weight on your ankle or foot  · Your swelling has increased or returned  Call your doctor if:   · Your pain does not go away, even after treatment  · You have questions or concerns about your condition or care  Treatment:   · Medicines:      ? NSAIDs , such as ibuprofen, help decrease swelling, pain, and fever  This medicine is available with or without a doctor's order  NSAIDs can cause stomach bleeding or kidney problems in certain people  If you take blood thinner medicine, always ask your healthcare provider if NSAIDs are safe for you  Always read the medicine label and follow directions  ? Acetaminophen  decreases pain and fever  It is available without a doctor's order  Ask how much to take and how often to take it  Follow directions  Read the labels of all other medicines you are using to see if they also contain acetaminophen, or ask your doctor or pharmacist  Acetaminophen can cause liver damage if not taken correctly  Do not use more than 4 grams (4,000 milligrams) total of acetaminophen in one day  ? Prescription pain medicine  may be given   Ask your healthcare provider how to take this medicine safely  Some prescription pain medicines contain acetaminophen  Do not take other medicines that contain acetaminophen without talking to your healthcare provider  Too much acetaminophen may cause liver damage  Prescription pain medicine may cause constipation  Ask your healthcare provider how to prevent or treat constipation  · Surgery  may be needed to repair or replace a torn ligament if your sprain does not heal with other treatments  Your healthcare provider may use screws to attach the bones in your ankle together  The screws may help support your ankle and make it stable  Ask your healthcare provider for more information about surgery to treat your ankle sprain  Self-care:   · Use support devices,  such as a brace, cast, or splint, to limit your movement and protect your joint  You may need to use crutches to decrease your pain as you move around  · Go to physical therapy as directed  A physical therapist teaches you exercises to help improve movement and strength, and to decrease pain  · Rest  your ankle so that it can heal  Return to normal activities as directed  · Apply ice  on your ankle for 15 to 20 minutes every hour or as directed  Use an ice pack, or put crushed ice in a plastic bag  Cover it with a towel  Ice helps prevent tissue damage and decreases swelling and pain  · Compress  your ankle  Ask if you should wrap an elastic bandage around your injured ligament  An elastic bandage provides support and helps decrease swelling and movement so your joint can heal  Wear as long as directed  · Elevate  your ankle above the level of your heart as often as you can  This will help decrease swelling and pain  Prop your ankle on pillows or blankets to keep it elevated comfortably  Prevent another ankle sprain:   · Let your ankle heal   Find out how long your ligament needs to heal  Do not do any physical activity until your healthcare provider says it is okay   If you start activity too soon, you may develop a more serious injury  · Always warm up and stretch  before you exercise or play sports  · Use the right equipment  Always wear shoes that fit well and are made for the activity that you are doing  You may also need ankle supports, elbow and knee pads, or braces  Follow up with your doctor as directed:  Write down your questions so you remember to ask them during your visits  © Copyright [employer] 2021 Information is for End User's use only and may not be sold, redistributed or otherwise used for commercial purposes  All illustrations and images included in CareNotes® are the copyrighted property of A D A M [employer] Inc  or [employer]   The above information is an  only  It is not intended as medical advice for individual conditions or treatments  Talk to your doctor, nurse or pharmacist before following any medical regimen to see if it is safe and effective for you  Gonzalez Neuroma   AMBULATORY CARE:   Keyanna Cote neuroma  is inflammation of one of the nerves in your foot  It usually occurs in the ball of your foot, between your third and fourth toes  Common signs and symptoms:   · Pain in your foot, especially when you walk    · Achy or burning sensation    · Feeling like you are stepping on a small stone or a wrinkled sock    · Numbness, tingling, or prickling that may spread to your toes    Contact your healthcare provider if:   · Your symptoms spread to your toes  · Your symptoms do not improve after treatment  · You have questions or concerns about your condition or care  Treatment:   · NSAIDs  help decrease swelling and pain or fever  This medicine is available with or without a doctor's order  NSAIDs can cause stomach bleeding or kidney problems in certain people  If you take blood thinner medicine, always ask your healthcare provider if NSAIDs are safe for you  Always read the medicine label and follow directions      · An injection  of steroids, ethanol, or numbing medicine may decrease pain and swelling  · Surgery  may be needed if other treatments do not work  The tissues around the nerve may be cut to relieve pressure  The nerve may also be removed completely  Wear flat shoes with a wide toe box: This will decrease the pressure on the front of your foot  Wear orthotics, arch supports, or foot pads: These help relieve pressure and cushion the ball of your foot  You may need a medical shoe insert ordered by your healthcare provider  Do an ice massage to decrease pain and swelling:  Freeze a paper or foam cup filled with water and roll it under your foot  Do this for 20 minutes, 2 times each day  Follow up with your healthcare provider as directed:  Write down your questions so you remember to ask them during your visits  © Copyright [employer] 2021 Information is for End User's use only and may not be sold, redistributed or otherwise used for commercial purposes  All illustrations and images included in CareNotes® are the copyrighted property of A D A M [employer] Inc  or [facility] Araceli Palomo   The above information is an  only  It is not intended as medical advice for individual conditions or treatments  Talk to your doctor, nurse or pharmacist before following any medical regimen to see if it is safe and effective for you

## 2021-08-10 NOTE — PROGRESS NOTES
[facility]        NAME: Shu Mcarthur is a 44 y o  female  : 1970    MRN: [id number]  DATE: 2021  TIME: 8:15 PM    Assessment and Plan   Acute left ankle pain [M25 [id number]]  1  Acute left ankle pain  XR ankle 3+ vw left   2  Left foot pain  XR foot 3+ vw left   3  Plantar fasciitis, bilateral     4  Sprain of left ankle, unspecified ligament, initial encounter      patient presents with foot and ankle pain the left side with swelling and tenderness  Recommend x-rays to rule out fractures  Three-view x-ray of the left ankle demonstrates no acute fractures or dislocations no bony abnormalities noted to my read  Three-view x-ray of the left foot demonstrates no acute fractures dislocations no bony abnormalities noted  Discussed with patient that she is no apparent fractures patient appears to have plantar fasciitis with possible Gonzalez neuroma and ankle sprain  We discussed lace-up ankle brace  She will take Tylenol 1000 mg up to 4 times a day not to exceed 4000 mg  She will avoid NSAIDs at this time as she just had her 1st dose of COVID vaccine  We discussed heat and ice as well  Patient will follow-up with her podiatrist in 1-2 weeks if symptoms not improved  She reports emergency room symptoms worsen  Patient Instructions     Patient Instructions     Ibuprofen 600 mg three times daily for the next 5-7 days as needed for pain  Hold until 2 weeks after 2nd COVID  Tylenol 1,000 mg every 6-8 hours, do not exceed 4,000 mg in 24 hours  Activity as tolerated  Lace up ankle brace to left ankle as needed  Discontinue after 2 weeks  If symptoms are not improved in 1-2 weeks follow-up with podiatry  Heat and ice 20 minutes on as needed  Metatarsal pads to ball of foot just below the toes  If symptoms worsen report to the emergency room  Plantar Fasciitis   AMBULATORY CARE:   Plantar fasciitis  PF is swelling of the plantar fascia   The plantar fascia is a thick band of SOB (shortness of breath)  Pt with one week history of SOB and productive cough. Former smoker. Some wheezing on decreased breath sounds on exam. Remains on O2.   Continue scheduled duonebs. Supplemental oxygen.    tissue that connects your heel bone to your toes  This part of your foot helps support the arch of your foot and absorbs shock  Tension and stress can cause small tears on the thick band of tissue  These small tears can grow larger with repeated stretching and tearing  The band of tissue can become swollen and painful  Signs and symptoms:   · Pain on the bottom of your foot near your heel    · Pain that is worse right after you get out of bed or after a long period of rest    · Pain after activity    · Stiffness in your foot    · Heel swelling    Call your doctor if:   · Your pain or swelling suddenly increase  · You develop knee, hip, or back pain  · You have questions or concerns about your condition or care  Treatment  may include any of the following:  · Medicines  may be given to decrease swelling and pain  · Shoe inserts, splints, or tape  help support your foot and decrease stress on your plantar fascia  A night splint may help stretch your plantar fascia while you sleep  · Stretches and exercises  can help decrease pain and swelling  They can also help strengthen the muscles that support your heel and foot  · Surgery  may be needed when other treatments do not work  During surgery, the plantar fascia is  from your heel  Self-care:   · Wear your splint or shoe inserts as directed  You may need to wear a splint at night to keep your foot stretched while you sleep  This will help prevent sharp pain first thing in the morning  Shoe inserts will help decrease stress on your plantar fascia when you walk or exercise  · Apply ice on your plantar fascia  Ice helps prevent tissue damage and decreases swelling and pain  Fill a water bottle with water and freeze it  Wrap a towel around the bottle or cover it with a pillow case  Roll the water bottle under your foot for 10 minutes in the morning and after work  · Massage your plantar fascia as directed    This may help decrease swelling and pain  Roll a golf ball under your foot for 10 minutes  Repeat 3 times each day  · Go to physical therapy as directed  A physical therapist teaches you exercises to help improve movement and strength, and to decrease pain  Prevent plantar fasciitis:   · Maintain a healthy weight  This will help decrease stress on your feet  Ask your healthcare provider how much you should weigh  Ask him to help you create a weight loss plan if you are overweight  · Do low-impact exercises  Low-impact exercises decrease stress on your plantar fascia  Examples include swimming or bicycling  · Start new activities slowly  Increase the intensity and time gradually  · Wear shoes that fit well and support your arch  Replace your shoes before the padding or shock absorption wears out  Do not walk or  bare feet or sandals for long periods of time  Follow up with your doctor as directed:  Write down your questions so you remember to ask them during your visits  © [employer] 2021 Information is for End User's use only and may not be sold, redistributed or otherwise used for commercial purposes  All illustrations and images included in CareNotes® are the copyrighted property of A D A M , Inc  or [address] [place] [address]  The above information is an  only  It is not intended as medical advice for individual conditions or treatments  Talk to your doctor, nurse or pharmacist before following any medical regimen to see if it is safe and effective for you  Ankle Sprain   AMBULATORY CARE:   An ankle sprain  happens when 1 or more ligaments in your ankle joint stretch or tear  Ligaments are tough tissues that connect bones  Ligaments support your joints and keep your bones in place    Common symptoms include the following:   · Trouble moving your ankle or foot    · Pain when you touch or put weight on your ankle    · Bruised, swollen, or misshapen ankle    Seek care immediately if:   · You have severe pain in your ankle  · Your foot or toes are cold or numb  · Your ankle becomes more weak or unstable (wobbly)  · You are unable to put any weight on your ankle or foot  · Your swelling has increased or returned  Call your doctor if:   · Your pain does not go away, even after treatment  · You have questions or concerns about your condition or care  Treatment:   · Medicines:      ? NSAIDs , such as ibuprofen, help decrease swelling, pain, and fever  This medicine is available with or without a doctor's order  NSAIDs can cause stomach bleeding or kidney problems in certain people  If you take blood thinner medicine, always ask your healthcare provider if NSAIDs are safe for you  Always read the medicine label and follow directions  ? Acetaminophen  decreases pain and fever  It is available without a doctor's order  Ask how much to take and how often to take it  Follow directions  Read the labels of all other medicines you are using to see if they also contain acetaminophen, or ask your doctor or pharmacist  Acetaminophen can cause liver damage if not taken correctly  Do not use more than 4 grams (4,000 milligrams) total of acetaminophen in one day  ? Prescription pain medicine  may be given  Ask your healthcare provider how to take this medicine safely  Some prescription pain medicines contain acetaminophen  Do not take other medicines that contain acetaminophen without talking to your healthcare provider  Too much acetaminophen may cause liver damage  Prescription pain medicine may cause constipation  Ask your healthcare provider how to prevent or treat constipation  · Surgery  may be needed to repair or replace a torn ligament if your sprain does not heal with other treatments  Your healthcare provider may use screws to attach the bones in your ankle together  The screws may help support your ankle and make it stable   Ask your healthcare provider for more information about surgery to treat your ankle sprain  Self-care:   · Use support devices,  such as a brace, cast, or splint, to limit your movement and protect your joint  You may need to use crutches to decrease your pain as you move around  · Go to physical therapy as directed  A physical therapist teaches you exercises to help improve movement and strength, and to decrease pain  · Rest  your ankle so that it can heal  Return to normal activities as directed  · Apply ice  on your ankle for 15 to 20 minutes every hour or as directed  Use an ice pack, or put crushed ice in a plastic bag  Cover it with a towel  Ice helps prevent tissue damage and decreases swelling and pain  · Compress  your ankle  Ask if you should wrap an elastic bandage around your injured ligament  An elastic bandage provides support and helps decrease swelling and movement so your joint can heal  Wear as long as directed  · Elevate  your ankle above the level of your heart as often as you can  This will help decrease swelling and pain  Prop your ankle on pillows or blankets to keep it elevated comfortably  Prevent another ankle sprain:   · Let your ankle heal   Find out how long your ligament needs to heal  Do not do any physical activity until your healthcare provider says it is okay  If you start activity too soon, you may develop a more serious injury  · Always warm up and stretch  before you exercise or play sports  · Use the right equipment  Always wear shoes that fit well and are made for the activity that you are doing  You may also need ankle supports, elbow and knee pads, or braces  Follow up with your doctor as directed:  Write down your questions so you remember to ask them during your visits  © Copyright [employer] 2021 Information is for End User's use only and may not be sold, redistributed or otherwise used for commercial purposes   All illustrations and images included in [facility] are the copyrighted property of A D A M , Inc  or Rui Palomo   The above information is an  only  It is not intended as medical advice for individual conditions or treatments  Talk to your doctor, nurse or pharmacist before following any medical regimen to see if it is safe and effective for you  Gonzalez Neuroma   AMBULATORY CARE:   Candida Angry neuroma  is inflammation of one of the nerves in your foot  It usually occurs in the ball of your foot, between your third and fourth toes  Common signs and symptoms:   · Pain in your foot, especially when you walk    · Achy or burning sensation    · Feeling like you are stepping on a small stone or a wrinkled sock    · Numbness, tingling, or prickling that may spread to your toes    Contact your healthcare provider if:   · Your symptoms spread to your toes  · Your symptoms do not improve after treatment  · You have questions or concerns about your condition or care  Treatment:   · NSAIDs  help decrease swelling and pain or fever  This medicine is available with or without a doctor's order  NSAIDs can cause stomach bleeding or kidney problems in certain people  If you take blood thinner medicine, always ask your healthcare provider if NSAIDs are safe for you  Always read the medicine label and follow directions  · An injection  of steroids, ethanol, or numbing medicine may decrease pain and swelling  · Surgery  may be needed if other treatments do not work  The tissues around the nerve may be cut to relieve pressure  The nerve may also be removed completely  Wear flat shoes with a wide toe box: This will decrease the pressure on the front of your foot  Wear orthotics, arch supports, or foot pads: These help relieve pressure and cushion the ball of your foot  You may need a medical shoe insert ordered by your healthcare provider    Do an ice massage to decrease pain and swelling:  Freeze a paper or foam cup filled with water and roll it under your foot  Do this for 20 minutes, 2 times each day  Follow up with your healthcare provider as directed:  Write down your questions so you remember to ask them during your visits  © Copyright [employer] 2021 Information is for End User's use only and may not be sold, redistributed or otherwise used for commercial purposes  All illustrations and images included in CareNotes® are the copyrighted property of A D A M , Inc  or [facility] Araceli Palomo   The above information is an  only  It is not intended as medical advice for individual conditions or treatments  Talk to your doctor, nurse or pharmacist before following any medical regimen to see if it is safe and effective for you  Follow up with PCP in 3-5 days  Proceed to  ER if symptoms worsen  Chief Complaint     Chief Complaint   Patient presents with    Leg Swelling     left one is worse thant the right  pt states when she rubbing her knees does help  pt states the pain is from her foot extending to her knee  History of Present Illness       49-year-old female presents complaining of bilateral foot and ankle pain and swelling  Patient states that symptoms have been ongoing for the past couple weeks and gradually worsening  Patient states most severe pain is along the ball of the foot and is worse on the left rather than the right  Patient is more interested in having the left looked at today  Patient notes some occasional tingling in the toes  She states that the swelling is improved when legs are elevated and gradually worsens over the course of the day  Patient denies any acute injuries  No other concerns or complaints today  Review of Systems   Review of Systems   Musculoskeletal: Positive for arthralgias, gait problem and joint swelling           Current Medications       Current Outpatient Medications:     Ascorbic Acid (VITAMIN C GUMMIE PO), Take by mouth daily, Disp: , Rfl:     ascorbic acid (VITAMIN C) 1000 MG tablet, Take 1,000 mg by mouth daily, Disp: , Rfl:     Calcium Citrate 200 MG TABS, One tablet daily, Disp: , Rfl:     cholecalciferol (VITAMIN D3) 1,000 units tablet, Take 2,000 Units by mouth daily , Disp: , Rfl:     Cyanocobalamin ER (HM VITAMIN B12) 1000 MCG TBCR, Take 1 tablet by mouth daily , Disp: , Rfl:     diphenhydrAMINE HCl (BENADRYL ALLERGY PO), Take by mouth, Disp: , Rfl:     Flaxseed Oil OIL, Take 1 capsule by mouth daily , Disp: , Rfl:     Glucos-Chondroit-Collag-Hyal (GLUCOSAMINE CHONDROIT-COLLAGEN PO), One tablet daily, Disp: , Rfl:     levothyroxine 88 mcg tablet, Take 88 mcg by mouth daily, Disp: , Rfl:     loratadine (CLARITIN) 10 mg tablet, Take 10 mg by mouth daily, Disp: , Rfl:     Lysine 1000 MG TABS, Take 1,000 mg by mouth daily , Disp: , Rfl:     Magnesium 400 MG TABS, Take 1 tablet by mouth daily , Disp: , Rfl:     Multiple Vitamin (DAILY VITAMINS PO), Take 1 tablet by mouth daily, Disp: , Rfl:     OMEGA-3 FATTY ACIDS-VITAMIN E PO, Take 1,000 mg by mouth daily , Disp: , Rfl:     pyridoxine (B-6) 100 MG tablet, One tablet daily, Disp: , Rfl:     tobramycin (TOBREX) 0 3 % SOLN, Place 1 drop in the affected eye q 4 hours while awake for 5-7 days  , Disp: 5 mL, Rfl: 0    Turmeric 500 MG CAPS, Take 1 tablet by mouth daily , Disp: , Rfl:     valACYclovir (VALTREX) 1,000 mg tablet, Take 1 tablet (1,000 mg total) by mouth 2 (two) times a day for 1 day, Disp: 4 tablet, Rfl: 1    Current Allergies     Allergies as of 08/09/2021 - Reviewed 08/09/2021   Allergen Reaction Noted    Bee venom Anaphylaxis 06/30/2013    Dust mite extract  06/30/2013    Gabapentin Dizziness 11/03/2014    Molds & smuts Sneezing 05/23/2019    Other  06/30/2013    Pollen extract  10/29/2019    Bernard grass pollen allergen  06/30/2013    Latex Rash 04/12/2019            The following portions of the patient's history were reviewed and updated as appropriate: allergies, current medications, past family history, past medical history, past social history, past surgical history and problem list      Past Medical History:   Diagnosis Date    Cervical neuritis 05/16/2013    C7- T1 Left, Last assessed    Endometriosis, cervix     GERD (gastroesophageal reflux disease)     Herpes     Hiatal hernia     R INGUINAL    Hypothyroidism     Thyroid cancer [facility])     THYROID       Past Surgical History:   Procedure Laterality Date    [facility]      [facility]      HYSTERECTOMY  2005    has ovaries    IN LAP,CHOLECYSTECTOMY N/A 5/23/2019    Procedure: LAP  ZANE , IOCG;  Surgeon: Manuel Durand MD;  Location: [facility];  Service: General    THYROIDECTOMY      early 2008 [place]    TUBAL LIGATION         Family History   Problem Relation Age of Onset    Thyroid disease Mother     Cancer Father     Thyroid disease Sister     Thyroid disease Brother     Diabetes Maternal Grandmother          Medications have been verified  Objective   /60   Pulse 80   Temp 98 °F (36 7 °C)   Resp 18   Ht 5' 5" (1 651 m)   Wt 81 2 kg (179 lb)   SpO2 100%   BMI 29 79 kg/m²   No LMP recorded  Patient has had a hysterectomy  Physical Exam     Physical Exam  Vitals and nursing note reviewed  Constitutional:       General: She is awake  She is not in acute distress  Appearance: Normal appearance  She is well-developed and well-groomed  She is not ill-appearing, toxic-appearing or diaphoretic  HENT:      Head: Normocephalic and atraumatic  Right Ear: Hearing and external ear normal       Left Ear: Hearing and external ear normal    Eyes:      General: Lids are normal  Vision grossly intact  Gaze aligned appropriately  Cardiovascular:      Rate and Rhythm: Normal rate  Pulmonary:      Effort: Pulmonary effort is normal       Comments: Patient is speaking in full sentences with no increased respiratory effort  No audible wheezing or stridor  Musculoskeletal:      Cervical back: Normal range of motion  Right ankle: Normal       Right Achilles Tendon: Normal       Left ankle: Swelling present  Tenderness present over the lateral malleolus  No medial malleolus, ATF ligament, AITF ligament, CF ligament, posterior TF ligament, base of 5th metatarsal or proximal fibula tenderness  Anterior drawer test negative  Normal pulse  Left Achilles Tendon: Normal       Right foot: Swelling ( mild) present  Left foot: Normal range of motion and normal capillary refill  Swelling ( moderate) and tenderness ( on the plantar surface between the 2nd and 3rd and 3rd and 4th metatarsal head along with a plantar fascial distribution) present  No deformity, bunion, Charcot foot, foot drop, prominent metatarsal heads, laceration, bony tenderness or crepitus  Normal pulse  Skin:     General: Skin is warm and dry  Neurological:      Mental Status: She is alert and oriented to person, place, and time  Coordination: Coordination is intact  Gait: Gait is intact  Psychiatric:         Attention and Perception: Attention and perception normal          Mood and Affect: Mood and affect normal          Speech: Speech normal          Behavior: Behavior normal  Behavior is cooperative  Note: Portions of this record may have been created with voice recognition software  Occasional wrong word or "sound a like" substitutions may have occurred due to the inherent limitations of voice recognition software  Please read the chart carefully and recognize, using context, where substitutions have occurred  *

## 2021-09-03 ENCOUNTER — RA CDI HCC (OUTPATIENT)
Dept: OTHER | Facility: HOSPITAL | Age: 51
End: 2021-09-03

## 2021-09-03 NOTE — PROGRESS NOTES
[facility]       Chart reviewed, no opportunity found: CHART REVIEWED, NO OPPORTUNITY FOUND                        Patients insurance company:  [employer] [facility] (Medicare Advantage and Commercial)

## 2021-09-14 ENCOUNTER — OFFICE VISIT (OUTPATIENT)
Dept: FAMILY MEDICINE CLINIC | Facility: CLINIC | Age: 51
End: 2021-09-14
Payer: COMMERCIAL

## 2021-09-14 VITALS
DIASTOLIC BLOOD PRESSURE: 82 MMHG | HEIGHT: 65 IN | HEART RATE: 86 BPM | TEMPERATURE: 97.6 F | RESPIRATION RATE: 18 BRPM | OXYGEN SATURATION: 96 % | WEIGHT: 187.6 LBS | BODY MASS INDEX: 31.25 KG/M2 | SYSTOLIC BLOOD PRESSURE: 104 MMHG

## 2021-09-14 DIAGNOSIS — Z12.12 SCREENING FOR COLORECTAL CANCER: ICD-10-CM

## 2021-09-14 DIAGNOSIS — C73 PAPILLARY CARCINOMA OF THYROID (HCC): ICD-10-CM

## 2021-09-14 DIAGNOSIS — R60.1 GENERALIZED EDEMA: Primary | ICD-10-CM

## 2021-09-14 DIAGNOSIS — Z23 ENCOUNTER FOR IMMUNIZATION: ICD-10-CM

## 2021-09-14 DIAGNOSIS — Z00.00 HEALTHCARE MAINTENANCE: ICD-10-CM

## 2021-09-14 DIAGNOSIS — E89.0 POSTOPERATIVE HYPOTHYROIDISM: ICD-10-CM

## 2021-09-14 DIAGNOSIS — Z12.11 SCREENING FOR COLORECTAL CANCER: ICD-10-CM

## 2021-09-14 PROCEDURE — 90471 IMMUNIZATION ADMIN: CPT | Performed by: FAMILY MEDICINE

## 2021-09-14 PROCEDURE — 90682 RIV4 VACC RECOMBINANT DNA IM: CPT | Performed by: FAMILY MEDICINE

## 2021-09-14 PROCEDURE — 1036F TOBACCO NON-USER: CPT | Performed by: FAMILY MEDICINE

## 2021-09-14 PROCEDURE — 3008F BODY MASS INDEX DOCD: CPT | Performed by: FAMILY MEDICINE

## 2021-09-14 PROCEDURE — 99396 PREV VISIT EST AGE 40-64: CPT | Performed by: FAMILY MEDICINE

## 2021-09-14 RX ORDER — ZINC GLUCONATE 50 MG
50 TABLET ORAL DAILY
COMMUNITY

## 2021-09-14 RX ORDER — HYDROCHLOROTHIAZIDE 12.5 MG/1
12.5 TABLET ORAL DAILY
Qty: 30 TABLET | Refills: 1 | Status: SHIPPED | OUTPATIENT
Start: 2021-09-14

## 2021-09-26 NOTE — PROGRESS NOTES
Assessment/Plan: yearly health maintenance completed  Risk factor modification with regards to obesity  Patient overdue for colonoscopy  Recommend contacting GI for updated examination  Full thyroid labs ordered  Flu shot given today  CMP and CBC added  Recommend yearly mammography  The recheck yearly pending test results  The HCTZ added p r n  as needed for edema  Salt reduction as well as weight reduction reviewed  Her recommend keep legs elevated as best as possible  Consider OTC compression stockings  Health Maintenance  Lifestyle Advice: begin progressive daily aerobic exercise program, follow a low fat, low cholesterol diet, attempt to lose weight and return for routine annual checkups  Diet: unhealthy diet  Exercise: infrequently  Dental: regular dental visits  Vision: no vision problems  Preventative Health: Female Preventative: Last mammogram was 8-9-21    No problem-specific Assessment & Plan notes found for this encounter  Diagnoses and all orders for this visit:    Generalized edema  -     CBC and differential; Future  -     Comprehensive metabolic panel; Future  -     hydrochlorothiazide (HYDRODIURIL) 12 5 mg tablet; Take 1 tablet (12 5 mg total) by mouth daily As needed for edema    Encounter for immunization  -     influenza vaccine, quadrivalent, recombinant, PF, 0 5 mL, for patients 18 yr+ (FLUBLOK)    Screening for colorectal cancer    Postoperative hypothyroidism  -     TSH, 3rd generation; Future  -     T4, free; Future  -     Thyroglobulin Panel; Future    Healthcare maintenance  -     Comprehensive metabolic panel; Future  -     Lipid panel; Future    Papillary carcinoma of thyroid ([facility] [address] )    Other orders  -     Cancel: TDAP VACCINE GREATER THAN OR EQUAL TO 6YO IM  -     Cancel: Ambulatory referral to Gastroenterology; Future  -     zinc gluconate 50 mg tablet; Take 50 mg by mouth daily                  Subjective:      Patient ID: Parveen Brooke is a 44 y o  female  70-year-old female known to me for many years here for yearly physical   Mentions joint complaints  No other issues  Working  COVID a up-to-date   Mentions occasional swelling of the ankles  The following portions of the patient's history were reviewed and updated as appropriate: allergies, current medications, past family history, past medical history, past social history, past surgical history and problem list     Review of Systems   Constitutional: Negative for appetite change, fatigue, fever and unexpected weight change  HENT: Negative for congestion, ear pain, postnasal drip, rhinorrhea, sinus pressure, sinus pain and sore throat  Eyes: Negative for redness and visual disturbance  Respiratory: Negative for chest tightness and shortness of breath  Cardiovascular: Negative for chest pain, palpitations and leg swelling  Gastrointestinal: Negative for abdominal distention, abdominal pain, diarrhea and nausea  Endocrine: Negative for cold intolerance and heat intolerance  Genitourinary: Negative for dysuria and hematuria  Musculoskeletal: Positive for arthralgias  Negative for gait problem and myalgias  Skin: Negative for pallor and rash  Neurological: Negative for dizziness, tremors, weakness, light-headedness and headaches  Psychiatric/Behavioral: Negative for behavioral problems  The patient is not nervous/anxious  Objective:    /82 (BP Location: Left arm, Patient Position: Sitting, Cuff Size: Adult)   Pulse 86   Temp 97 6 °F (36 4 °C) (Temporal)   Resp 18   Ht 5' 5" (1 651 m)   Wt 85 1 kg (187 lb 9 6 oz)   SpO2 96%   BMI 31 22 kg/m²          Physical Exam  Vitals and nursing note reviewed  Constitutional:       General: She is not in acute distress  Appearance: She is not diaphoretic  HENT:      Head: Normocephalic and atraumatic  Eyes:      General: No scleral icterus       Conjunctiva/sclera: Conjunctivae normal       Pupils: Pupils are equal, round, and reactive to light  Neck:      Thyroid: No thyromegaly  Cardiovascular:      Rate and Rhythm: Normal rate and regular rhythm  Pulses:           Carotid pulses are 0 on the right side and 0 on the left side  Heart sounds: Normal heart sounds  No murmur heard  Pulmonary:      Effort: Pulmonary effort is normal  No respiratory distress  Breath sounds: Normal breath sounds  No wheezing  Abdominal:      General: There is no distension  Palpations: Abdomen is soft  Musculoskeletal:         General: No swelling or deformity  Cervical back: Normal range of motion and neck supple  Lymphadenopathy:      Cervical: No cervical adenopathy  Skin:     General: Skin is warm  Coloration: Skin is not pale  Neurological:      Mental Status: She is alert and oriented to person, place, and time  Cranial Nerves: No cranial nerve deficit  Deep Tendon Reflexes: Reflexes are normal and symmetric  Psychiatric:         Behavior: Behavior normal          Thought Content: Thought content normal          Judgment: Judgment normal              Bee venom, Dust mite extract, Gabapentin, Molds & smuts, Other, Pollen extract, Bernard grass pollen allergen, and Latex    Negin MARTIN  Cheryle Gene had no medications administered during this visit    Health Maintenance   Topic Date Due    Hepatitis C Screening  Never done    DTaP,Tdap,and Td Vaccines (1 - Tdap) 10/01/1989    Colorectal Cancer Screening  12/03/2020    BMI: Followup Plan  01/06/2021    Annual Physical  09/03/2021    Breast Cancer Screening: Mammogram  09/09/2021    Depression Screening  11/05/2021    HIV Screening  06/18/2029 (Originally 10/1/1983)    BMI: Adult  09/14/2022    Influenza Vaccine  Completed    COVID-19 Vaccine  Completed    Pneumococcal Vaccine: Pediatrics (0 to 5 Years) and At-Risk Patients (6 to 59 Years)  Aged Out    HIB Vaccine  Aged Out    Hepatitis B Vaccine  Aged Out    IPV Vaccine Aged Out    Hepatitis A Vaccine  Aged Out    Meningococcal ACWY Vaccine  Aged Out    HPV Vaccine  Aged Out      Social History     Socioeconomic History    Marital status: /Civil Union     Spouse name: Not on file    Number of children: Not on file    Years of education: Not on file    Highest education level: Not on file   Occupational History    Not on file   Tobacco Use    Smoking status: Never Smoker    Smokeless tobacco: Never Used   Substance and Sexual Activity    Alcohol use: Yes     Comment: socially    Drug use: No    Sexual activity: Not Currently   Other Topics Concern    Not on file   Social History Narrative    Not on file     Social Determinants of Health     Financial Resource Strain:     Difficulty of Paying Living Expenses:    Food Insecurity:     Worried About Running Out of Food in the Last Year:     Ran Out of Food in the Last Year:    Transportation Needs:     Lack of Transportation (Medical):      Lack of Transportation (Non-Medical):    Physical Activity:     Days of Exercise per Week:     Minutes of Exercise per Session:    Stress:     Feeling of Stress :    Social Connections:     Frequency of Communication with Friends and Family:     Frequency of Social Gatherings with Friends and Family:     Attends [employer] Services:     Active Member of Clubs or Organizations:     Attends Club or Organization Meetings:     Marital Status:    Intimate Partner Violence:     Fear of Current or Ex-Partner:     Emotionally Abused:     Physically Abused:     Sexually Abused:       Family History   Problem Relation Age of Onset    Thyroid disease Mother     Cancer Father     Thyroid disease Sister     Thyroid disease Brother     Diabetes Maternal Grandmother       Past Medical History:   Diagnosis Date    Cervical neuritis 05/16/2013    C7- T1 Left, Last assessed    Endometriosis, cervix     GERD (gastroesophageal reflux disease)     Herpes     Hiatal hernia R INGUINAL    Hypothyroidism     Thyroid cancer ([facility] [place] [address] )     THYROID      has a past surgical history that includes Colonoscopy; Thyroidectomy; Hysterectomy (2005); Tubal ligation; Hernia repair; and pr lap,cholecystectomy (N/A, 5/23/2019)     Patient Active Problem List    Diagnosis Date Noted    Vitamin D deficiency 07/12/2017    Abnormal fasting glucose 07/11/2017    Seasonal allergies 08/29/2016    History of thyroid cancer 08/27/2015    Postoperative hypothyroidism 08/27/2015    Lumbar degenerative disc disease 01/06/2015    Herpes simplex type 1 infection 01/21/2013    [facility] 09/14/2012       Current Outpatient Medications:     Ascorbic Acid (VITAMIN C GUMMIE PO), Take by mouth daily, Disp: , Rfl:     Calcium Citrate 200 MG TABS, One tablet daily, Disp: , Rfl:     cholecalciferol (VITAMIN D3) 1,000 units tablet, Take 2,000 Units by mouth daily , Disp: , Rfl:     Cyanocobalamin ER (HM VITAMIN B12) 1000 MCG TBCR, Take 1 tablet by mouth daily , Disp: , Rfl:     Flaxseed Oil OIL, Take 1 capsule by mouth daily , Disp: , Rfl:     Glucos-Chondroit-Collag-Hyal (GLUCOSAMINE CHONDROIT-COLLAGEN PO), One tablet daily, Disp: , Rfl:     levothyroxine 88 mcg tablet, Take 88 mcg by mouth daily, Disp: , Rfl:     loratadine (CLARITIN) 10 mg tablet, Take 10 mg by mouth daily, Disp: , Rfl:     Lysine 1000 MG TABS, Take 1,000 mg by mouth daily , Disp: , Rfl:     Magnesium 400 MG TABS, Take 1 tablet by mouth daily , Disp: , Rfl:     Multiple Vitamin (DAILY VITAMINS PO), Take 1 tablet by mouth daily, Disp: , Rfl:     OMEGA-3 FATTY ACIDS-VITAMIN E PO, Take 1,000 mg by mouth daily , Disp: , Rfl:     pyridoxine (B-6) 100 MG tablet, One tablet daily, Disp: , Rfl:     Turmeric 500 MG CAPS, Take 1 tablet by mouth daily , Disp: , Rfl:     zinc gluconate 50 mg tablet, Take 50 mg by mouth daily, Disp: , Rfl:     ascorbic acid (VITAMIN C) 1000 MG tablet, Take 1,000 mg by mouth daily (Patient not taking: Reported on 9/14/2021), Disp: , Rfl:     diphenhydrAMINE HCl (BENADRYL ALLERGY PO), Take by mouth, Disp: , Rfl:     hydrochlorothiazide (HYDRODIURIL) 12 5 mg tablet, Take 1 tablet (12 5 mg total) by mouth daily As needed for edema, Disp: 30 tablet, Rfl: 1    valACYclovir (VALTREX) 1,000 mg tablet, Take 1 tablet (1,000 mg total) by mouth 2 (two) times a day for 1 day, Disp: 4 tablet, Rfl: 1              BMI Counseling: Body mass index is 31 22 kg/m²  The BMI is above normal  Nutrition recommendations include decreasing portion sizes, encouraging healthy choices of fruits and vegetables, decreasing fast food intake, consuming healthier snacks, limiting drinks that contain sugar, moderation in carbohydrate intake, increasing intake of lean protein, reducing intake of saturated and trans fat and reducing intake of cholesterol  Rationale for BMI follow-up plan is due to patient being overweight or obese

## 2021-10-02 ENCOUNTER — APPOINTMENT (OUTPATIENT)
Dept: LAB | Age: 51
End: 2021-10-02
Payer: COMMERCIAL

## 2021-10-02 DIAGNOSIS — E89.0 POSTOPERATIVE HYPOTHYROIDISM: ICD-10-CM

## 2021-10-02 DIAGNOSIS — R60.1 GENERALIZED EDEMA: ICD-10-CM

## 2021-10-02 DIAGNOSIS — Z00.00 HEALTHCARE MAINTENANCE: ICD-10-CM

## 2021-10-02 LAB
ALBUMIN SERPL BCP-MCNC: 4 G/DL (ref 3.5–5)
ALP SERPL-CCNC: 78 U/L (ref 46–116)
ALT SERPL W P-5'-P-CCNC: 49 U/L (ref 12–78)
ANION GAP SERPL CALCULATED.3IONS-SCNC: 3 MMOL/L (ref 4–13)
AST SERPL W P-5'-P-CCNC: 20 U/L (ref 5–45)
BASOPHILS # BLD AUTO: 0.06 THOUSANDS/ΜL (ref 0–0.1)
BASOPHILS NFR BLD AUTO: 1 % (ref 0–1)
BILIRUB SERPL-MCNC: 0.37 MG/DL (ref 0.2–1)
BUN SERPL-MCNC: 15 MG/DL (ref 5–25)
CALCIUM SERPL-MCNC: 9.2 MG/DL (ref 8.3–10.1)
CHLORIDE SERPL-SCNC: 108 MMOL/L (ref 100–108)
CHOLEST SERPL-MCNC: 215 MG/DL (ref 50–200)
CO2 SERPL-SCNC: 29 MMOL/L (ref 21–32)
CREAT SERPL-MCNC: 0.8 MG/DL (ref 0.6–1.3)
EOSINOPHIL # BLD AUTO: 0.17 THOUSAND/ΜL (ref 0–0.61)
EOSINOPHIL NFR BLD AUTO: 3 % (ref 0–6)
ERYTHROCYTE [DISTWIDTH] IN BLOOD BY AUTOMATED COUNT: 12.6 % (ref 11.6–15.1)
GFR SERPL CREATININE-BSD FRML MDRD: 84 ML/MIN/1.73SQ M
GLUCOSE P FAST SERPL-MCNC: 91 MG/DL (ref 65–99)
HCT VFR BLD AUTO: 46.6 % (ref 34.8–46.1)
HDLC SERPL-MCNC: 42 MG/DL
HGB BLD-MCNC: 15 G/DL (ref 11.5–15.4)
IMM GRANULOCYTES # BLD AUTO: 0.02 THOUSAND/UL (ref 0–0.2)
IMM GRANULOCYTES NFR BLD AUTO: 0 % (ref 0–2)
LDLC SERPL CALC-MCNC: 155 MG/DL (ref 0–100)
LYMPHOCYTES # BLD AUTO: 2.07 THOUSANDS/ΜL (ref 0.6–4.47)
LYMPHOCYTES NFR BLD AUTO: 33 % (ref 14–44)
MCH RBC QN AUTO: 30.5 PG (ref 26.8–34.3)
MCHC RBC AUTO-ENTMCNC: 32.2 G/DL (ref 31.4–37.4)
MCV RBC AUTO: 95 FL (ref 82–98)
MONOCYTES # BLD AUTO: 0.47 THOUSAND/ΜL (ref 0.17–1.22)
MONOCYTES NFR BLD AUTO: 8 % (ref 4–12)
NEUTROPHILS # BLD AUTO: 3.51 THOUSANDS/ΜL (ref 1.85–7.62)
NEUTS SEG NFR BLD AUTO: 55 % (ref 43–75)
NONHDLC SERPL-MCNC: 173 MG/DL
NRBC BLD AUTO-RTO: 0 /100 WBCS
PLATELET # BLD AUTO: 276 THOUSANDS/UL (ref 149–390)
PMV BLD AUTO: 11.2 FL (ref 8.9–12.7)
POTASSIUM SERPL-SCNC: 4.3 MMOL/L (ref 3.5–5.3)
PROT SERPL-MCNC: 7.6 G/DL (ref 6.4–8.2)
RBC # BLD AUTO: 4.91 MILLION/UL (ref 3.81–5.12)
SODIUM SERPL-SCNC: 140 MMOL/L (ref 136–145)
T4 FREE SERPL-MCNC: 0.84 NG/DL (ref 0.76–1.46)
TRIGL SERPL-MCNC: 88 MG/DL
TSH SERPL DL<=0.05 MIU/L-ACNC: 3.7 UIU/ML (ref 0.36–3.74)
WBC # BLD AUTO: 6.3 THOUSAND/UL (ref 4.31–10.16)

## 2021-10-02 PROCEDURE — 85025 COMPLETE CBC W/AUTO DIFF WBC: CPT

## 2021-10-02 PROCEDURE — 84439 ASSAY OF FREE THYROXINE: CPT

## 2021-10-02 PROCEDURE — 80053 COMPREHEN METABOLIC PANEL: CPT

## 2021-10-02 PROCEDURE — 80061 LIPID PANEL: CPT

## 2021-10-02 PROCEDURE — 36415 COLL VENOUS BLD VENIPUNCTURE: CPT

## 2021-10-02 PROCEDURE — 84443 ASSAY THYROID STIM HORMONE: CPT

## 2021-10-02 PROCEDURE — 86800 THYROGLOBULIN ANTIBODY: CPT

## 2021-10-02 PROCEDURE — 84432 ASSAY OF THYROGLOBULIN: CPT

## 2021-10-05 LAB
THYROGLOB AB SERPL-ACNC: <1 IU/ML (ref 0–0.9)
THYROGLOB SERPL-MCNC: <0.1 NG/ML (ref 1.5–38.5)

## 2021-10-19 ENCOUNTER — OFFICE VISIT (OUTPATIENT)
Dept: FAMILY MEDICINE CLINIC | Facility: CLINIC | Age: 51
End: 2021-10-19
Payer: COMMERCIAL

## 2021-10-19 VITALS
SYSTOLIC BLOOD PRESSURE: 110 MMHG | DIASTOLIC BLOOD PRESSURE: 82 MMHG | OXYGEN SATURATION: 97 % | HEART RATE: 87 BPM | HEIGHT: 65 IN | RESPIRATION RATE: 16 BRPM | BODY MASS INDEX: 30.62 KG/M2 | TEMPERATURE: 96.8 F | WEIGHT: 183.8 LBS

## 2021-10-19 DIAGNOSIS — E89.0 POSTOPERATIVE HYPOTHYROIDISM: Primary | ICD-10-CM

## 2021-10-19 DIAGNOSIS — T14.8XXA BRUISING: ICD-10-CM

## 2021-10-19 DIAGNOSIS — E66.09 CLASS 1 OBESITY DUE TO EXCESS CALORIES WITHOUT SERIOUS COMORBIDITY WITH BODY MASS INDEX (BMI) OF 30.0 TO 30.9 IN ADULT: ICD-10-CM

## 2021-10-19 DIAGNOSIS — Z12.11 COLON CANCER SCREENING: ICD-10-CM

## 2021-10-19 DIAGNOSIS — Z83.3 FAMILY HISTORY OF DIABETES MELLITUS IN MATERNAL GRANDMOTHER: ICD-10-CM

## 2021-10-19 DIAGNOSIS — R60.1 GENERALIZED EDEMA: ICD-10-CM

## 2021-10-19 DIAGNOSIS — Z12.12 SCREENING FOR COLORECTAL CANCER: ICD-10-CM

## 2021-10-19 DIAGNOSIS — Z12.11 SCREENING FOR COLORECTAL CANCER: ICD-10-CM

## 2021-10-19 PROCEDURE — 99213 OFFICE O/P EST LOW 20 MIN: CPT | Performed by: FAMILY MEDICINE

## 2021-10-19 PROCEDURE — 3008F BODY MASS INDEX DOCD: CPT | Performed by: FAMILY MEDICINE

## 2021-10-19 PROCEDURE — 3725F SCREEN DEPRESSION PERFORMED: CPT | Performed by: FAMILY MEDICINE

## 2021-10-19 RX ORDER — DICLOFENAC SODIUM 16.05 MG/ML
SOLUTION TOPICAL
COMMUNITY
Start: 2021-10-04

## 2021-10-19 RX ORDER — LEVOTHYROXINE SODIUM 88 UG/1
TABLET ORAL
COMMUNITY
Start: 2021-10-05

## 2021-11-08 ENCOUNTER — TELEPHONE (OUTPATIENT)
Dept: ADMINISTRATIVE | Facility: OTHER | Age: 51
End: 2021-11-08

## 2021-12-29 ENCOUNTER — OFFICE VISIT (OUTPATIENT)
Dept: URGENT CARE | Age: 51
End: 2021-12-29
Payer: COMMERCIAL

## 2021-12-29 VITALS
WEIGHT: 188.4 LBS | TEMPERATURE: 97.6 F | OXYGEN SATURATION: 99 % | BODY MASS INDEX: 31.39 KG/M2 | HEART RATE: 71 BPM | RESPIRATION RATE: 20 BRPM | DIASTOLIC BLOOD PRESSURE: 81 MMHG | SYSTOLIC BLOOD PRESSURE: 135 MMHG | HEIGHT: 65 IN

## 2021-12-29 DIAGNOSIS — H66.002 NON-RECURRENT ACUTE SUPPURATIVE OTITIS MEDIA OF LEFT EAR WITHOUT SPONTANEOUS RUPTURE OF TYMPANIC MEMBRANE: Primary | ICD-10-CM

## 2021-12-29 PROCEDURE — 99213 OFFICE O/P EST LOW 20 MIN: CPT

## 2021-12-29 RX ORDER — AMOXICILLIN 875 MG/1
875 TABLET, COATED ORAL 2 TIMES DAILY
Qty: 14 TABLET | Refills: 0 | Status: SHIPPED | OUTPATIENT
Start: 2021-12-29 | End: 2022-01-05

## 2021-12-29 RX ORDER — AMOXICILLIN 875 MG/1
875 TABLET, COATED ORAL 2 TIMES DAILY
Qty: 14 TABLET | Refills: 0 | Status: SHIPPED | OUTPATIENT
Start: 2021-12-29 | End: 2021-12-29

## 2022-01-06 ENCOUNTER — TELEPHONE (OUTPATIENT)
Dept: FAMILY MEDICINE CLINIC | Facility: CLINIC | Age: 52
End: 2022-01-06

## 2022-01-06 NOTE — TELEPHONE ENCOUNTER
Pt called the office she saw [address] now on 12/29/21 and was diagnosed with an ear infection in both ears  Pt finished the antibiotic this past Tuesday 01/04/21  When pt talks she has vibrations in now in her right it was the left  Pt also has a humming in her left ear  No pain at all and no pain when she saw urgent care until they looked in her ear  Pt is aware no availability today or tomorrow for appointments  Please advise  Pt uses [address] in [place]     Pt's number is [phone]

## 2022-01-07 DIAGNOSIS — H69.80 DYSFUNCTION OF EUSTACHIAN TUBE, UNSPECIFIED LATERALITY: Primary | ICD-10-CM

## 2022-01-07 RX ORDER — PREDNISONE 10 MG/1
TABLET ORAL
Qty: 21 TABLET | Refills: 0 | Status: SHIPPED | OUTPATIENT
Start: 2022-01-07

## 2022-06-09 ENCOUNTER — OFFICE VISIT (OUTPATIENT)
Dept: FAMILY MEDICINE CLINIC | Facility: CLINIC | Age: 52
End: 2022-06-09
Payer: COMMERCIAL

## 2022-06-09 VITALS
HEIGHT: 66 IN | DIASTOLIC BLOOD PRESSURE: 72 MMHG | HEART RATE: 84 BPM | RESPIRATION RATE: 16 BRPM | TEMPERATURE: 96.8 F | WEIGHT: 184.2 LBS | OXYGEN SATURATION: 98 % | SYSTOLIC BLOOD PRESSURE: 108 MMHG | BODY MASS INDEX: 29.6 KG/M2

## 2022-06-09 DIAGNOSIS — C73 PAPILLARY CARCINOMA OF THYROID (HCC): ICD-10-CM

## 2022-06-09 DIAGNOSIS — H60.392 OTHER INFECTIVE ACUTE OTITIS EXTERNA OF LEFT EAR: Primary | ICD-10-CM

## 2022-06-09 PROCEDURE — 3008F BODY MASS INDEX DOCD: CPT | Performed by: NURSE PRACTITIONER

## 2022-06-09 PROCEDURE — 3725F SCREEN DEPRESSION PERFORMED: CPT | Performed by: NURSE PRACTITIONER

## 2022-06-09 PROCEDURE — 99214 OFFICE O/P EST MOD 30 MIN: CPT | Performed by: NURSE PRACTITIONER

## 2022-06-09 PROCEDURE — 1036F TOBACCO NON-USER: CPT | Performed by: NURSE PRACTITIONER

## 2022-06-09 RX ORDER — OMEGA-3-ACID ETHYL ESTERS 1 G/1
2 CAPSULE, LIQUID FILLED ORAL 2 TIMES DAILY
COMMUNITY

## 2022-06-09 RX ORDER — CIPROFLOXACIN AND DEXAMETHASONE 3; 1 MG/ML; MG/ML
4 SUSPENSION/ DROPS AURICULAR (OTIC) 2 TIMES DAILY
Qty: 7.5 ML | Refills: 0 | Status: SHIPPED | OUTPATIENT
Start: 2022-06-09

## 2022-06-09 NOTE — PATIENT INSTRUCTIONS
Start ear drops, this is twice a day for 7 days in left ear  Please call the office if you are experiencing any worsening of symptoms or no symptom improvement

## 2022-06-09 NOTE — PROGRESS NOTES
Assessment/Plan:   Diagnosis ICD-10-CM Associated Orders   1  Other infective acute otitis externa of left ear  H60 392 ciprofloxacin-dexamethasone (CIPRODEX) otic suspension   2  Papillary carcinoma of thyroid ([facility])  C73        Otitis Externa  Start Ciprodex 4 drops BID x 7 days to left ear  Advised on red flag symptoms/ when to call  Please call the office if you are experiencing any worsening of symptoms or no symptom improvement  Papillary carcinoma of thyroid ([facility] [address] )  Follows with endocrinology at [facility] for surveillance/ treatment       Advised to call the office for any worsening of symptoms or no symptom improvement  Patient verbalizes understand and agrees with treatment plan  Diagnoses and all orders for this visit:    Other infective acute otitis externa of left ear  -     ciprofloxacin-dexamethasone (CIPRODEX) otic suspension; Administer 4 drops into the left ear 2 (two) times a day For 7 days  Papillary carcinoma of thyroid ([facility] [address] )    Other orders  -     omega-3-acid ethyl esters (LOVAZA) 1 g capsule; Take 2 g by mouth 2 (two) times a day              Subjective:        Patient ID: Bakari Car is a 46 y o  female  Chief Complaint   Patient presents with   Frutoso Perfect     left ear pain        Here for evaluation of ear pain  Left ear pain started last night  Now the outside of her ear hurts  No drainage that she noticed  No itching with her ear  No fevers/chills  No hearing changes that she knew of but her  noted it  She does use q tips for cleaning, Took Tylenol 10:30 am and it did help  No recent swimming  The following portions of the patient's history were reviewed and updated as appropriate: allergies, current medications, past family history, past social history and problem list     Review of Systems   Constitutional: Negative for chills and fever  HENT: Positive for ear pain  Eyes: Negative for discharge  Respiratory: Negative for shortness of breath  Cardiovascular: Negative for chest pain  Gastrointestinal: Negative for constipation and diarrhea  Genitourinary: Negative for difficulty urinating  Musculoskeletal: Negative for joint swelling  Skin: Negative for rash  Neurological: Negative for headaches  Hematological: Negative for adenopathy  Psychiatric/Behavioral: The patient is not nervous/anxious  Objective:  /72 (BP Location: Left arm, Patient Position: Sitting, Cuff Size: Adult)   Pulse 84   Temp (!) 96 8 °F (36 °C) (Temporal)   Resp 16   Ht 5' 5 5" (1 664 m)   Wt 83 6 kg (184 lb 3 2 oz)   SpO2 98%   BMI 30 19 kg/m²      Physical Exam  Vitals and nursing note reviewed  Constitutional:       General: She is not in acute distress  Appearance: She is well-developed  She is obese  She is not diaphoretic  HENT:      Head: Normocephalic and atraumatic  Right Ear: External ear normal  A middle ear effusion is present  Tympanic membrane is retracted  Tympanic membrane is not perforated or erythematous  Left Ear: External ear normal  A middle ear effusion is present  Tympanic membrane is retracted  Tympanic membrane is not perforated or erythematous  Ears:      Comments: Left ear canal erythema and mild swelling   Eyes:      General: Lids are normal          Right eye: No discharge  Left eye: No discharge  Conjunctiva/sclera: Conjunctivae normal    Pulmonary:      Effort: Pulmonary effort is normal  No respiratory distress  Musculoskeletal:         General: No deformity  Cervical back: Neck supple  Skin:     General: Skin is warm and dry  Neurological:      Mental Status: She is alert and oriented to person, place, and time  Psychiatric:         Speech: Speech normal          Behavior: Behavior normal          Thought Content:  Thought content normal          Judgment: Judgment normal              Depression Screening and Follow-up Plan: Patient was screened for depression during today's encounter  They screened negative with a PHQ-2 score of 0  Current Outpatient Medications:     Ascorbic Acid (VITAMIN C GUMMIE PO), Take by mouth daily, Disp: , Rfl:     Calcium Citrate 200 MG TABS, One tablet daily, Disp: , Rfl:     cholecalciferol (VITAMIN D3) 1,000 units tablet, Take 2,000 Units by mouth daily , Disp: , Rfl:     ciprofloxacin-dexamethasone (CIPRODEX) otic suspension, Administer 4 drops into the left ear 2 (two) times a day For 7 days  , Disp: 7 5 mL, Rfl: 0    Cyanocobalamin ER 1000 MCG TBCR, Take 1 tablet by mouth daily , Disp: , Rfl:     Diclofenac Sodium 1 5 % SOLN, APPLY 40 DROPS TO THE AFFECTED AREAS 3-4 TIMES DAILY AS NEEDED FOR PAIN, Disp: , Rfl:     Flaxseed Oil OIL, Take 1 capsule by mouth daily , Disp: , Rfl:     Glucos-Chondroit-Collag-Hyal (GLUCOSAMINE CHONDROIT-COLLAGEN PO), One tablet daily, Disp: , Rfl:     levothyroxine 88 mcg tablet, Tab 1 po daily plus extra tab 1/2 once weekly, Disp: , Rfl:     loratadine (CLARITIN) 10 mg tablet, Take 10 mg by mouth daily, Disp: , Rfl:     Lysine 1000 MG TABS, Take 1,000 mg by mouth daily , Disp: , Rfl:     Magnesium 400 MG TABS, Take 1 tablet by mouth daily , Disp: , Rfl:     Multiple Vitamin (DAILY VITAMINS PO), Take 1 tablet by mouth daily, Disp: , Rfl:     omega-3-acid ethyl esters (LOVAZA) 1 g capsule, Take 2 g by mouth 2 (two) times a day, Disp: , Rfl:     pyridoxine (B-6) 100 MG tablet, One tablet daily, Disp: , Rfl:     Turmeric 500 MG CAPS, Take 1 tablet by mouth daily , Disp: , Rfl:     valACYclovir (VALTREX) 1,000 mg tablet, Take 1 tablet (1,000 mg total) by mouth 2 (two) times a day for 1 day, Disp: 4 tablet, Rfl: 1    zinc gluconate 50 mg tablet, Take 50 mg by mouth daily, Disp: , Rfl:     hydrochlorothiazide (HYDRODIURIL) 12 5 mg tablet, Take 1 tablet (12 5 mg total) by mouth daily As needed for edema (Patient not taking: Reported on 6/9/2022), Disp: 30 tablet, Rfl: 1    predniSONE 10 mg tablet, 6 tabs Day 1, 5 tabs Day 2, 4 tabs Day 3, 3 tabs Day 4, 2 tabs Day 5, 1 tab Day 6  (Patient not taking: Reported on 6/9/2022), Disp: 21 tablet, Rfl: 0  Allergies   Allergen Reactions    Bee Venom Anaphylaxis    Dust Mite Extract     Gabapentin Dizziness     Annotation - [id number]: "felt like out of body experience"    Molds & Smuts Sneezing    Other      Annotation - [id number]: seasonal, Trees    Pollen Extract     Bernard Grass Pollen Allergen     Latex Rash

## 2022-06-14 ENCOUNTER — TELEPHONE (OUTPATIENT)
Dept: FAMILY MEDICINE CLINIC | Facility: CLINIC | Age: 52
End: 2022-06-14

## 2022-06-14 DIAGNOSIS — H60.392 OTHER INFECTIVE ACUTE OTITIS EXTERNA OF LEFT EAR: Primary | ICD-10-CM

## 2022-06-14 RX ORDER — AZITHROMYCIN 250 MG/1
TABLET, FILM COATED ORAL
Qty: 6 TABLET | Refills: 0 | Status: SHIPPED | OUTPATIENT
Start: 2022-06-14 | End: 2022-06-19

## 2022-06-14 NOTE — TELEPHONE ENCOUNTER
Pt returned the office's call se is aware of your message and recommendations  Pt expressed verbal understanding call complete

## 2022-06-14 NOTE — TELEPHONE ENCOUNTER
Pt  Was in the office last Thursday for ear pain and the drops that Lesa Yung gave her are not helping and now her other ear is starting to bother her  Is there anyway a prescribed for an antibiotic can be sent in to the [facility] on [address] in [place]?

## 2022-07-29 ENCOUNTER — TELEMEDICINE (OUTPATIENT)
Dept: FAMILY MEDICINE CLINIC | Facility: CLINIC | Age: 52
End: 2022-07-29
Payer: COMMERCIAL

## 2022-07-29 DIAGNOSIS — J01.10 ACUTE NON-RECURRENT FRONTAL SINUSITIS: Primary | ICD-10-CM

## 2022-07-29 PROCEDURE — 99213 OFFICE O/P EST LOW 20 MIN: CPT | Performed by: FAMILY MEDICINE

## 2022-07-29 RX ORDER — AMOXICILLIN 875 MG/1
875 TABLET, COATED ORAL 2 TIMES DAILY
Qty: 20 TABLET | Refills: 0 | Status: SHIPPED | OUTPATIENT
Start: 2022-07-29 | End: 2022-08-08

## 2022-07-29 NOTE — PROGRESS NOTES
COVID-19 Outpatient Progress Note    Assessment/Plan:    Problem List Items Addressed This Visit        Respiratory    Acute non-recurrent frontal sinusitis - Primary     Patient to conitnue nasal lavage I will also have her get some OTC tylenol sinus and advil cold and sinus I will also call in amoxil for her               Disposition:     After clarifying the patient's history, my suspicion for COVID-19 infection is very low  Discussed symptom directed medication options with patient  I have spent 7 minutes directly with the patient  Greater than 50% of this time was spent in counseling/coordination of care regarding: diagnostic results, risks and benefits of treatment options, instructions for management, patient and family education, risk factor reductions and impressions  Encounter provider Marcos Lopez DO    Provider located at [address] [place] [address] [address]  [facility] [id number] [phone]    Recent Visits  No visits were found meeting these conditions  Showing recent visits within past 7 days and meeting all other requirements  Today's Visits  Date Type Provider Dept   07/29/22 Telemedicine Macros Lopez, [facility]   Showing today's visits and meeting all other requirements  Future Appointments  No visits were found meeting these conditions  Showing future appointments within next 150 days and meeting all other requirements   Patient and I connected on amwell However she was unable to get her camera on so I could not see patient only hear them  This virtual check-in was done via telephone and she agrees to proceed  Patient agrees to participate in a virtual check in via telephone or video visit instead of presenting to the office to address urgent/immediate medical needs  Patient is aware this is a billable service      After connecting through Telephone, the patient was identified by name and date of birth  Ollie Colbert was informed that this was a telemedicine visit and that the exam was being conducted confidentially over secure lines  My office door was closed  No one else was in the room  Ollie Colbert acknowledged consent and understanding of privacy and security of the telemedicine visit  I informed the patient that I have reviewed her record in Epic and presented the opportunity for her to ask any questions regarding the visit today  The patient agreed to participate  It was my intent to perform this visit via video technology but the patient was not able to do a video connection so the visit was completed via audio telephone only  Verification of patient location:  Patient is located in the following state in which I hold an active license: [place]    Subjective:   Ollie Colbert is a 46 y o  female who is concerned about COVID-19  Patient's symptoms include chills, fatigue, malaise, nasal congestion, rhinorrhea, abdominal pain, diarrhea, myalgias and headache   Patient denies fever, sore throat, anosmia, loss of taste, cough, shortness of breath, chest tightness, nausea and vomiting      - Date of symptom onset: 7/26/2022      COVID-19 vaccination status: Fully vaccinated (primary series)    Exposure:   Contact with a person who is under investigation (PUI) for or who is positive for COVID-19 within the last 14 days?: No    Hospitalized recently for fever and/or lower respiratory symptoms?: No      Currently a healthcare worker that is involved in direct patient care?: No      Works in a special setting where the risk of COVID-19 transmission may be high? (this may include long-term care, correctional and [facility] facilities; homeless shelters; assisted-living facilities and group homes ): No      Resident in a special setting where the risk of COVID-19 transmission may be high? (this may include long-term care, correctional and [facility] facilities; homeless shelters; assisted-living facilities and group homes ): No      covid test done today and was negative     Lab Results   Component Value Date    SARSCOV2 Positive (A) 05/04/2021     Past Medical History:   Diagnosis Date    Cervical neuritis 05/16/2013    C7- T1 Left, Last assessed    Endometriosis, cervix     GERD (gastroesophageal reflux disease)     Herpes     Hiatal hernia     R INGUINAL    Hypothyroidism     Thyroid cancer [facility])     THYROID     Past Surgical History:   Procedure Laterality Date    COLONOSCOPY      2015  Anastasiya  2020   Pratibha  HERNIA REPAIR      HYSTERECTOMY  2005    has ovaries    NM LAP,CHOLECYSTECTOMY N/A 5/23/2019    Procedure: LAP  ZANE , IOCG;  Surgeon: Argentina Araiza MD;  Location: [facility];  Service: General    THYROIDECTOMY      early 2008 [facility]    TUBAL LIGATION       Current Outpatient Medications   Medication Sig Dispense Refill    Ascorbic Acid (VITAMIN C GUMMIE PO) Take by mouth daily      Calcium Citrate 200 MG TABS One tablet daily      cholecalciferol (VITAMIN D3) 1,000 units tablet Take 2,000 Units by mouth daily       ciprofloxacin-dexamethasone (CIPRODEX) otic suspension Administer 4 drops into the left ear 2 (two) times a day For 7 days   7 5 mL 0    Cyanocobalamin ER 1000 MCG TBCR Take 1 tablet by mouth daily       Diclofenac Sodium 1 5 % SOLN APPLY 40 DROPS TO THE AFFECTED AREAS 3-4 TIMES DAILY AS NEEDED FOR PAIN      Flaxseed Oil OIL Take 1 capsule by mouth daily       Glucos-Chondroit-Collag-Hyal (GLUCOSAMINE CHONDROIT-COLLAGEN PO) One tablet daily      hydrochlorothiazide (HYDRODIURIL) 12 5 mg tablet Take 1 tablet (12 5 mg total) by mouth daily As needed for edema (Patient not taking: Reported on 6/9/2022) 30 tablet 1    levothyroxine 88 mcg tablet Tab 1 po daily plus extra tab 1/2 once weekly      loratadine (CLARITIN) 10 mg tablet Take 10 mg by mouth daily      Lysine 1000 MG TABS Take 1,000 mg by mouth daily       Magnesium 400 MG TABS Take 1 tablet by mouth daily       Multiple Vitamin (DAILY VITAMINS PO) Take 1 tablet by mouth daily      omega-3-acid ethyl esters (LOVAZA) 1 g capsule Take 2 g by mouth 2 (two) times a day      predniSONE 10 mg tablet 6 tabs Day 1, 5 tabs Day 2, 4 tabs Day 3, 3 tabs Day 4, 2 tabs Day 5, 1 tab Day 6  (Patient not taking: Reported on 6/9/2022) 21 tablet 0    pyridoxine (B-6) 100 MG tablet One tablet daily      Turmeric 500 MG CAPS Take 1 tablet by mouth daily       valACYclovir (VALTREX) 1,000 mg tablet Take 1 tablet (1,000 mg total) by mouth 2 (two) times a day for 1 day 4 tablet 1    zinc gluconate 50 mg tablet Take 50 mg by mouth daily       No current facility-administered medications for this visit  Allergies   Allergen Reactions    Bee Venom Anaphylaxis    Dust Mite Extract     Gabapentin Dizziness     Annotation - [id number]: "felt like out of body experience"    Molds & Smuts Sneezing    Other      Annotation - [id number]: seasonal, Trees    Pollen Extract     Bernard Grass Pollen Allergen     Latex Rash       Review of Systems   Constitutional: Positive for chills and fatigue  Negative for fever  HENT: Positive for congestion and rhinorrhea  Negative for sore throat  Respiratory: Negative for cough, chest tightness and shortness of breath  Gastrointestinal: Positive for abdominal pain and diarrhea  Negative for nausea and vomiting  Musculoskeletal: Positive for myalgias  Neurological: Positive for headaches  Objective: There were no vitals filed for this visit  Physical Exam    [facility] verb[facility] to participate in [employer]  Pt is aware that [employer] could be limited without vital signs or the ability to perform a full hands-on physical Collette Southern understands she or the provider may request at any time to terminate the video visit and request the patient to seek care or treatment in person

## 2022-07-29 NOTE — ASSESSMENT & PLAN NOTE
Patient to conitnue nasal lavage I will also have her get some OTC tylenol sinus and advil cold and sinus I will also call in amoxil for her

## 2022-10-01 ENCOUNTER — HOSPITAL ENCOUNTER (OUTPATIENT)
Dept: RADIOLOGY | Facility: HOSPITAL | Age: 52
Discharge: HOME | End: 2022-10-01
Payer: COMMERCIAL

## 2022-10-01 ENCOUNTER — APPOINTMENT (OUTPATIENT)
Dept: LAB | Facility: HOSPITAL | Age: 52
End: 2022-10-01
Payer: COMMERCIAL

## 2022-10-01 ENCOUNTER — HOSPITAL ENCOUNTER (OUTPATIENT)
Dept: MRI IMAGING | Facility: HOSPITAL | Age: 52
Discharge: HOME | End: 2022-10-01
Attending: PODIATRIST
Payer: COMMERCIAL

## 2022-10-01 DIAGNOSIS — Z85.850 PERSONAL HISTORY OF MALIGNANT NEOPLASM OF THYROID: ICD-10-CM

## 2022-10-01 DIAGNOSIS — Z83.3 FAMILY HISTORY OF DIABETES MELLITUS IN MATERNAL GRANDMOTHER: ICD-10-CM

## 2022-10-01 DIAGNOSIS — R60.1 GENERALIZED EDEMA: ICD-10-CM

## 2022-10-01 DIAGNOSIS — E66.09 CLASS 1 OBESITY DUE TO EXCESS CALORIES WITHOUT SERIOUS COMORBIDITY WITH BODY MASS INDEX (BMI) OF 30.0 TO 30.9 IN ADULT: ICD-10-CM

## 2022-10-01 DIAGNOSIS — T14.8XXA BRUISING: ICD-10-CM

## 2022-10-01 DIAGNOSIS — G57.62 LESION OF LEFT PLANTAR NERVE: ICD-10-CM

## 2022-10-01 DIAGNOSIS — T15.90XA FOREIGN BODY ACCIDENTALLY ENTERING EYE AND ADNEXA: ICD-10-CM

## 2022-10-01 DIAGNOSIS — E89.0 POSTSURGICAL HYPOTHYROIDISM: ICD-10-CM

## 2022-10-01 LAB
ANION GAP SERPL CALCULATED.3IONS-SCNC: 6 MMOL/L (ref 4–13)
APTT PPP: 25 SECONDS (ref 23–37)
BUN SERPL-MCNC: 13 MG/DL (ref 5–25)
CALCIUM SERPL-MCNC: 9.5 MG/DL (ref 8.3–10.1)
CHLORIDE SERPL-SCNC: 102 MMOL/L (ref 96–108)
CO2 SERPL-SCNC: 31 MMOL/L (ref 21–32)
CREAT SERPL-MCNC: 0.76 MG/DL (ref 0.6–1.3)
EST. AVERAGE GLUCOSE BLD GHB EST-MCNC: 108 MG/DL
GFR SERPL CREATININE-BSD FRML MDRD: 89 ML/MIN/1.73SQ M
GLUCOSE P FAST SERPL-MCNC: 90 MG/DL (ref 65–99)
HBA1C MFR BLD: 5.4 %
INR PPP: 0.91 (ref 0.84–1.19)
POTASSIUM SERPL-SCNC: 4.3 MMOL/L (ref 3.5–5.3)
PROTHROMBIN TIME: 12.3 SECONDS (ref 11.6–14.5)
SODIUM SERPL-SCNC: 139 MMOL/L (ref 135–147)
T4 FREE SERPL-MCNC: 1.08 NG/DL (ref 0.76–1.46)
TSH SERPL DL<=0.05 MIU/L-ACNC: 3.96 UIU/ML (ref 0.45–4.5)

## 2022-10-01 PROCEDURE — 73718 MRI LOWER EXTREMITY W/O DYE: CPT

## 2022-10-01 PROCEDURE — 86800 THYROGLOBULIN ANTIBODY: CPT

## 2022-10-01 PROCEDURE — 83036 HEMOGLOBIN GLYCOSYLATED A1C: CPT

## 2022-10-01 PROCEDURE — 80048 BASIC METABOLIC PNL TOTAL CA: CPT

## 2022-10-01 PROCEDURE — G1004 CDSM NDSC: HCPCS

## 2022-10-01 PROCEDURE — 85610 PROTHROMBIN TIME: CPT

## 2022-10-01 PROCEDURE — 85730 THROMBOPLASTIN TIME PARTIAL: CPT

## 2022-10-01 PROCEDURE — 84439 ASSAY OF FREE THYROXINE: CPT

## 2022-10-01 PROCEDURE — 84432 ASSAY OF THYROGLOBULIN: CPT

## 2022-10-01 PROCEDURE — 36415 COLL VENOUS BLD VENIPUNCTURE: CPT

## 2022-10-01 PROCEDURE — 84443 ASSAY THYROID STIM HORMONE: CPT

## 2022-10-04 LAB
THYROGLOB AB SERPL-ACNC: <1 IU/ML (ref 0–0.9)
THYROGLOB SERPL-MCNC: <0.1 NG/ML (ref 1.5–38.5)

## 2022-10-11 PROBLEM — J01.10 ACUTE NON-RECURRENT FRONTAL SINUSITIS: Status: RESOLVED | Noted: 2022-07-29 | Resolved: 2022-10-11

## 2022-10-21 ENCOUNTER — TRANSCRIBE ORDERS (OUTPATIENT)
Dept: FAMILY MEDICINE CLINIC | Facility: CLINIC | Age: 52
End: 2022-10-21

## 2022-10-21 DIAGNOSIS — Z12.11 COLON CANCER SCREENING: Primary | ICD-10-CM

## 2023-01-14 ENCOUNTER — APPOINTMENT (OUTPATIENT)
Dept: LAB | Age: 53
End: 2023-01-14

## 2023-01-14 DIAGNOSIS — E89.0 POSTSURGICAL HYPOTHYROIDISM: ICD-10-CM

## 2023-01-14 LAB
T4 FREE SERPL-MCNC: 0.94 NG/DL (ref 0.76–1.46)
TSH SERPL DL<=0.05 MIU/L-ACNC: 2.58 UIU/ML (ref 0.45–4.5)

## 2023-02-22 ENCOUNTER — OFFICE VISIT (OUTPATIENT)
Dept: FAMILY MEDICINE CLINIC | Facility: CLINIC | Age: 53
End: 2023-02-22

## 2023-02-22 VITALS
HEART RATE: 96 BPM | BODY MASS INDEX: 31.63 KG/M2 | OXYGEN SATURATION: 96 % | DIASTOLIC BLOOD PRESSURE: 59 MMHG | SYSTOLIC BLOOD PRESSURE: 119 MMHG | RESPIRATION RATE: 16 BRPM | TEMPERATURE: 98 F | HEIGHT: 66 IN | WEIGHT: 196.8 LBS

## 2023-02-22 DIAGNOSIS — M54.50 CHRONIC RIGHT-SIDED LOW BACK PAIN WITHOUT SCIATICA: Primary | ICD-10-CM

## 2023-02-22 DIAGNOSIS — G89.29 CHRONIC RIGHT-SIDED LOW BACK PAIN WITHOUT SCIATICA: Primary | ICD-10-CM

## 2023-02-22 DIAGNOSIS — C73 PAPILLARY CARCINOMA OF THYROID (HCC): ICD-10-CM

## 2023-02-22 DIAGNOSIS — E78.00 PURE HYPERCHOLESTEROLEMIA: ICD-10-CM

## 2023-02-22 RX ORDER — CYCLOBENZAPRINE HCL 10 MG
10 TABLET ORAL
Qty: 20 TABLET | Refills: 0 | Status: SHIPPED | OUTPATIENT
Start: 2023-02-22

## 2023-02-24 NOTE — PROGRESS NOTES
Assessment/Plan: Treat as possible right-sided low back pain without sciatica  Start Flexeril at bedtime as needed  Range of motion exercises reviewed  Heat intermittently  Has only been using Tylenol  Has Celebrex  Recommend restart Celebrex  If not improving consider short steroid pack and potential physical therapy  Patient will keep us updated otherwise due for routine CMP and lipid panel and yearly eval   Blood pressure stable    No problem-specific Assessment & Plan notes found for this encounter  Diagnoses and all orders for this visit:    Chronic right-sided low back pain without sciatica  -     cyclobenzaprine (FLEXERIL) 10 mg tablet; Take 1 tablet (10 mg total) by mouth daily at bedtime As needed for muscle stiffness    Pure hypercholesterolemia  -     Comprehensive metabolic panel; Future  -     Lipid panel; Future    Papillary carcinoma of thyroid ([facility] [address] )        1  Chronic right-sided low back pain without sciatica  cyclobenzaprine (FLEXERIL) 10 mg tablet      2  Pure hypercholesterolemia  Comprehensive metabolic panel    Lipid panel      3  Papillary carcinoma of thyroid (HCC)            Subjective:        Patient ID: Ltanya Rowdy is a 45 y o  female  Chief Complaint   Patient presents with   • Leg Pain     Patient states pain scale 6/10 pain scale since 02/18/2023       Patient with right-sided low back pain  Sits a lot at work  No injury  Did start some [employer]    The following portions of the patient's history were reviewed and updated as appropriate: past medical history, past surgical history and problem list       Review of Systems   Constitutional: Negative for fatigue  Eyes: Negative for visual disturbance  Respiratory: Negative for cough and shortness of breath  Cardiovascular: Negative for chest pain, palpitations and leg swelling  Gastrointestinal: Negative for abdominal pain  Musculoskeletal: Positive for back pain     Neurological: Negative for dizziness and headaches  Psychiatric/Behavioral: The patient is not nervous/anxious  Objective:  /59 (BP Location: Left arm, Patient Position: Sitting, Cuff Size: Large)   Pulse 96   Temp 98 °F (36 7 °C) (Temporal)   Resp 16   Ht 5' 5 5" (1 664 m)   Wt 89 3 kg (196 lb 12 8 oz)   SpO2 96%   BMI 32 25 kg/m²        Physical Exam  Vitals and nursing note reviewed  Constitutional:       General: She is not in acute distress  HENT:      Head: Normocephalic  Eyes:      General: No scleral icterus  Pupils: Pupils are equal, round, and reactive to light  Cardiovascular:      Rate and Rhythm: Normal rate and regular rhythm  Heart sounds: Normal heart sounds  No murmur heard  Pulmonary:      Breath sounds: Normal breath sounds  Musculoskeletal:      Right lower leg: No edema  Left lower leg: No edema  Comments: Slightly reduced lumbar range of motion  Negative SLR   Lymphadenopathy:      Cervical: No cervical adenopathy  Skin:     Coloration: Skin is not pale  Neurological:      General: No focal deficit present  Mental Status: She is alert and oriented to person, place, and time  Psychiatric:         Behavior: Behavior normal          Thought Content:  Thought content normal

## 2023-03-01 ENCOUNTER — OFFICE VISIT (OUTPATIENT)
Dept: FAMILY MEDICINE CLINIC | Facility: CLINIC | Age: 53
End: 2023-03-01

## 2023-03-01 VITALS
TEMPERATURE: 97.1 F | WEIGHT: 198.4 LBS | DIASTOLIC BLOOD PRESSURE: 80 MMHG | OXYGEN SATURATION: 98 % | BODY MASS INDEX: 31.88 KG/M2 | HEIGHT: 66 IN | SYSTOLIC BLOOD PRESSURE: 118 MMHG

## 2023-03-01 DIAGNOSIS — H81.10 BENIGN PAROXYSMAL POSITIONAL VERTIGO, UNSPECIFIED LATERALITY: Primary | ICD-10-CM

## 2023-03-01 DIAGNOSIS — R07.89 OTHER CHEST PAIN: ICD-10-CM

## 2023-03-02 ENCOUNTER — TELEPHONE (OUTPATIENT)
Dept: FAMILY MEDICINE CLINIC | Facility: CLINIC | Age: 53
End: 2023-03-02

## 2023-03-03 ENCOUNTER — OFFICE VISIT (OUTPATIENT)
Dept: FAMILY MEDICINE CLINIC | Facility: CLINIC | Age: 53
End: 2023-03-03

## 2023-03-03 ENCOUNTER — HOSPITAL ENCOUNTER (EMERGENCY)
Facility: HOSPITAL | Age: 53
Discharge: HOME | End: 2023-03-03
Attending: EMERGENCY MEDICINE

## 2023-03-03 ENCOUNTER — APPOINTMENT (EMERGENCY)
Dept: CT IMAGING | Facility: HOSPITAL | Age: 53
End: 2023-03-03

## 2023-03-03 VITALS
BODY MASS INDEX: 31.76 KG/M2 | HEART RATE: 70 BPM | WEIGHT: 193.78 LBS | OXYGEN SATURATION: 99 % | DIASTOLIC BLOOD PRESSURE: 83 MMHG | TEMPERATURE: 97.6 F | SYSTOLIC BLOOD PRESSURE: 114 MMHG | RESPIRATION RATE: 16 BRPM

## 2023-03-03 VITALS
SYSTOLIC BLOOD PRESSURE: 118 MMHG | TEMPERATURE: 97.1 F | WEIGHT: 193 LBS | BODY MASS INDEX: 31.02 KG/M2 | HEIGHT: 66 IN | DIASTOLIC BLOOD PRESSURE: 82 MMHG

## 2023-03-03 DIAGNOSIS — R42 DIZZINESS: Primary | ICD-10-CM

## 2023-03-03 DIAGNOSIS — R42 VERTIGO: Primary | ICD-10-CM

## 2023-03-03 LAB
ANION GAP SERPL CALCULATED.3IONS-SCNC: 6 MMOL/L (ref 4–13)
ATRIAL RATE: 64 BPM
BASOPHILS # BLD AUTO: 0.06 THOUSANDS/ÂΜL (ref 0–0.1)
BASOPHILS NFR BLD AUTO: 1 % (ref 0–1)
BUN SERPL-MCNC: 17 MG/DL (ref 5–25)
CALCIUM SERPL-MCNC: 10.1 MG/DL (ref 8.4–10.2)
CARDIAC TROPONIN I PNL SERPL HS: <2 NG/L
CHLORIDE SERPL-SCNC: 105 MMOL/L (ref 96–108)
CO2 SERPL-SCNC: 29 MMOL/L (ref 21–32)
CREAT SERPL-MCNC: 0.8 MG/DL (ref 0.6–1.3)
EOSINOPHIL # BLD AUTO: 0.15 THOUSAND/ÂΜL (ref 0–0.61)
EOSINOPHIL NFR BLD AUTO: 2 % (ref 0–6)
ERYTHROCYTE [DISTWIDTH] IN BLOOD BY AUTOMATED COUNT: 12.6 % (ref 11.6–15.1)
GFR SERPL CREATININE-BSD FRML MDRD: 83 ML/MIN/1.73SQ M
GLUCOSE SERPL-MCNC: 91 MG/DL (ref 65–140)
HCT VFR BLD AUTO: 47.4 % (ref 34.8–46.1)
HGB BLD-MCNC: 15.6 G/DL (ref 11.5–15.4)
IMM GRANULOCYTES # BLD AUTO: 0.03 THOUSAND/UL (ref 0–0.2)
IMM GRANULOCYTES NFR BLD AUTO: 0 % (ref 0–2)
LYMPHOCYTES # BLD AUTO: 1.78 THOUSANDS/ÂΜL (ref 0.6–4.47)
LYMPHOCYTES NFR BLD AUTO: 19 % (ref 14–44)
MCH RBC QN AUTO: 30.6 PG (ref 26.8–34.3)
MCHC RBC AUTO-ENTMCNC: 32.9 G/DL (ref 31.4–37.4)
MCV RBC AUTO: 93 FL (ref 82–98)
MONOCYTES # BLD AUTO: 0.71 THOUSAND/ÂΜL (ref 0.17–1.22)
MONOCYTES NFR BLD AUTO: 7 % (ref 4–12)
NEUTROPHILS # BLD AUTO: 6.84 THOUSANDS/ÂΜL (ref 1.85–7.62)
NEUTS SEG NFR BLD AUTO: 71 % (ref 43–75)
NRBC BLD AUTO-RTO: 0 /100 WBCS
P AXIS: 35 DEGREES
PLATELET # BLD AUTO: 251 THOUSANDS/UL (ref 149–390)
PMV BLD AUTO: 10.5 FL (ref 8.9–12.7)
POTASSIUM SERPL-SCNC: 4.2 MMOL/L (ref 3.5–5.3)
PR INTERVAL: 166 MS
QRS AXIS: -13 DEGREES
QRSD INTERVAL: 146 MS
QT INTERVAL: 452 MS
QTC INTERVAL: 466 MS
RBC # BLD AUTO: 5.09 MILLION/UL (ref 3.81–5.12)
SL AMB POCT GLUCOSE BLD: 112
SODIUM SERPL-SCNC: 140 MMOL/L (ref 135–147)
T WAVE AXIS: 121 DEGREES
TSH SERPL DL<=0.05 MIU/L-ACNC: 1.19 UIU/ML (ref 0.45–4.5)
VENTRICULAR RATE: 64 BPM
WBC # BLD AUTO: 9.57 THOUSAND/UL (ref 4.31–10.16)

## 2023-03-03 RX ADMIN — IOHEXOL 85 ML: 350 INJECTION, SOLUTION INTRAVENOUS at 14:31

## 2023-03-03 NOTE — ED PROVIDER NOTES
History  Chief Complaint   Patient presents with   • Dizziness     Patient reporting dizziness, n/v, and chest pain since Tuesday  Patient was seen at her pcps on Wednesday and prescribed Meclizine on Thursday which the patient states improved her symptoms  She did not take any today because she states it makes her drowsy  Patient was seen by her pcp today where she had an ECG done  44 yo F c/o onset of a sharp left sided chest pain, exacerbated by moving her arm, and associated with a symptom she calls "dizziness", which she describes as feeling off balance, and woozy, with nausea, no lightheadedness and no syncope, first noticed 2/28/23, and has been continuously exacerbated standing, and walking she feels off balance  She has not previously experienced similar symptoms  She was prescribed meclizine outpatient for the symptom, and it was helping, then today she followed up with PCP, c/o some right ear pain, found to have mild cerumen impaction, which was removed  Afterward her dizziness was greatly exacerbated, then she was referred to the ED  Currently lying still she feels better, sensation is reproducible with sitting and standing  History provided by:  Patient      Prior to Admission Medications   Prescriptions Last Dose Informant Patient Reported? Taking?    Ascorbic Acid (VITAMIN C GUMMIE PO)   Yes No   Sig: Take by mouth daily   Calcium Citrate 200 MG TABS   Yes No   Sig: One tablet daily   Cyanocobalamin ER 1000 MCG TBCR   Yes No   Sig: Take 1 tablet by mouth daily   Flaxseed Oil OIL   Yes No   Sig: Take 1 capsule by mouth daily    Glucos-Chondroit-Collag-Hyal (GLUCOSAMINE CHONDROIT-COLLAGEN PO)   Yes No   Lysine 1000 MG TABS   Yes No   Sig: Take 1,000 mg by mouth daily    Magnesium 400 MG TABS   Yes No   Sig: Take 1 tablet by mouth daily    Turmeric 500 MG CAPS   Yes No   Sig: Take 1 tablet by mouth daily    celecoxib (CeleBREX) 200 mg capsule   Yes No   Sig: TAKE 1 CAPSULE EVERY DAY BY ORAL ROUTE    cholecalciferol (VITAMIN D3) 1,000 units tablet   Yes No   Sig: Take 2,000 Units by mouth daily    cyclobenzaprine (FLEXERIL) 10 mg tablet   No No   Sig: Take 1 tablet (10 mg total) by mouth daily at bedtime As needed for muscle stiffness   levothyroxine 88 mcg tablet   Yes No   Si mcg   loratadine (CLARITIN) 10 mg tablet   Yes No   Sig: Take 10 mg by mouth daily   Patient not taking: Reported on 3/3/2023   omega-3-acid ethyl esters (LOVAZA) 1 g capsule   Yes No   Sig: Take 2 g by mouth 2 (two) times a day   pyridoxine (B-6) 100 MG tablet   Yes No   Sig: One tablet daily   sodium picosulfate, magnesium oxide, citric acid (Clenpiq) 10-3 5-12 MG-GM -GM/160ML SOLN   No No   Sig: Take 1 kit by mouth see administration instructions   valACYclovir (VALTREX) 1,000 mg tablet   No No   Sig: Take 1 tablet (1,000 mg total) by mouth 2 (two) times a day for 1 day      Facility-Administered Medications: None       Past Medical History:   Diagnosis Date   • Cervical neuritis 2013    C7- T1 Left, Last assessed   • Endometriosis, cervix    • GERD (gastroesophageal reflux disease)    • Herpes    • Hiatal hernia     R INGUINAL   • Hypothyroidism    • Thyroid cancer [facility])     THYROID       Past Surgical History:   Procedure Laterality Date   • CHOLECYSTECTOMY  2020   • COLONOSCOPY  2015  Julieth Hinojosa  Normal exam, hemorrhoids  • COLONOSCOPY      Dr Julieth Hinojosa  Normal exam, hemorrhoids  • Nigel Clink    Dr Cheryl Uriarte  Normal exam   • HERNIA REPAIR     • HYSTERECTOMY      has ovaries   • MD LAPAROSCOPY SURG CHOLECYSTECTOMY N/A 2019    Procedure: LAP   ZANE , IOCG;  Surgeon: Carolyn Lake MD;  Location: [facility];  Service: General   • THYROIDECTOMY      early  [place]   • TUBAL LIGATION         Family History   Problem Relation Age of Onset   • Thyroid disease Mother    • Cancer Father    • Colon polyps Father    • Thyroid disease Sister    • Thyroid disease Brother    • Diabetes Maternal Grandmother      I have reviewed and agree with the history as documented  E-Cigarette/Vaping   • E-Cigarette Use Never User      E-Cigarette/Vaping Substances   • Nicotine No    • THC No    • CBD No    • Flavoring No    • Other No    • Unknown No      Social History     Tobacco Use   • Smoking status: Never   • Smokeless tobacco: Never   Vaping Use   • Vaping Use: Never used   Substance Use Topics   • Alcohol use: Not Currently     Comment: socially   • Drug use: No       Review of Systems    Physical Exam  Physical Exam  Vitals and nursing note reviewed  Constitutional:       Appearance: She is well-developed  She is not toxic-appearing or diaphoretic  HENT:      Head: Normocephalic and atraumatic  Right Ear: Tympanic membrane and external ear normal       Left Ear: Tympanic membrane and external ear normal       Nose: Nose normal    Eyes:      Conjunctiva/sclera: Conjunctivae normal       Pupils: Pupils are equal, round, and reactive to light  Neck:      Meningeal: Brudzinski's sign and Kernig's sign absent  Cardiovascular:      Rate and Rhythm: Normal rate and regular rhythm  Pulses: Normal pulses  Heart sounds: Normal heart sounds  No murmur heard  Pulmonary:      Effort: Pulmonary effort is normal  No tachypnea or respiratory distress  Breath sounds: Normal breath sounds  No wheezing  Abdominal:      General: Bowel sounds are normal  There is no distension  Palpations: Abdomen is soft  Abdomen is not rigid  Tenderness: There is no abdominal tenderness  There is no guarding or rebound  Musculoskeletal:         General: Normal range of motion  Cervical back: Full passive range of motion without pain, normal range of motion and neck supple  Right lower leg: No swelling  Left lower leg: No swelling  Lymphadenopathy:      Cervical: No cervical adenopathy  Skin:     General: Skin is warm and dry  Coloration: Skin is not pale  Findings: No rash  Neurological:      Mental Status: She is alert and oriented to person, place, and time  GCS: GCS eye subscore is 4  GCS verbal subscore is 5  GCS motor subscore is 6  Cranial Nerves: No cranial nerve deficit  Sensory: No sensory deficit  Motor: No weakness or pronator drift  Coordination: Romberg sign negative  Coordination normal  Finger-Nose-Finger Test normal       Gait: Gait abnormal (she is not acute ataxic, but prefers to walk slowly, possibly slightly wide stance due to subejctive sensation of being off balance)  Deep Tendon Reflexes: Reflexes are normal and symmetric  Psychiatric:         Speech: Speech normal          Behavior: Behavior normal          Thought Content: Thought content normal          Judgment: Judgment normal          Vital Signs  ED Triage Vitals [03/03/23 1237]   Temperature Pulse Respirations Blood Pressure SpO2   97 6 °F (36 4 °C) 73 18 112/65 98 %      Temp Source Heart Rate Source Patient Position - Orthostatic VS BP Location FiO2 (%)   Temporal Monitor Sitting Right arm --      Pain Score       1           Vitals:    03/03/23 1237 03/03/23 1618   BP: 112/65 114/83   Pulse: 73 70   Patient Position - Orthostatic VS: Sitting Lying         Visual Acuity  Visual Acuity    Flowsheet Row Most Recent Value   L Pupil Size (mm) 3   R Pupil Size (mm) 3          ED Medications  Medications   iohexol (OMNIPAQUE) 350 MG/ML injection (SINGLE-DOSE) 85 mL (85 mL Intravenous Given 3/3/23 1431)       Diagnostic Studies  Results Reviewed     Procedure Component Value Units Date/Time    TSH [[id number]]  (Normal) Collected: 03/03/23 1342    Lab Status: Final result Specimen: Blood from Arm, Left Updated: 03/03/23 1438     TSH [address] [place] [address] 1 187 uIU/mL     Narrative:      Patients undergoing fluorescein dye angiography may retain small amounts of fluorescein in the body for 48-72 hours post procedure   Samples containing fluorescein can produce falsely depressed TSH values  If the patient had this procedure,a specimen should be resubmitted post fluorescein clearance        HS Troponin 0hr (reflex protocol) [[id number]]  (Normal) Collected: 03/03/23 1342    Lab Status: Final result Specimen: Blood from Arm, Left Updated: 03/03/23 1415     hs TnI 0hr <2 ng/L     Basic metabolic panel [[id number]] Collected: 03/03/23 1342    Lab Status: Final result Specimen: Blood from Arm, Left Updated: 03/03/23 1406     Sodium 140 mmol/L      Potassium 4 2 mmol/L      Chloride 105 mmol/L      CO2 29 mmol/L      ANION GAP 6 mmol/L      BUN 17 mg/dL      Creatinine 0 80 mg/dL      Glucose 91 mg/dL      Calcium 10 1 mg/dL      eGFR 83 ml/min/1 73sq m     Narrative:      [place] guidelines for Chronic Kidney Disease (CKD):   •  Stage 1 with normal or high GFR (GFR > 90 mL/min/1 73 square meters)  •  Stage 2 Mild CKD (GFR = 60-89 mL/min/1 73 square meters)  •  Stage 3A Moderate CKD (GFR = 45-59 mL/min/1 73 square meters)  •  Stage 3B Moderate CKD (GFR = 30-44 mL/min/1 73 square meters)  •  Stage 4 Severe CKD (GFR = 15-29 mL/min/1 73 square meters)  •  Stage 5 End Stage CKD (GFR <15 mL/min/1 73 square meters)  Note: GFR calculation is accurate only with a steady state creatinine    CBC and differential [[id number]]  (Abnormal) Collected: 03/03/23 1342    Lab Status: Final result Specimen: Blood from Arm, Left Updated: 03/03/23 1352     WBC 9 57 Thousand/uL      RBC 5 09 Million/uL      Hemoglobin 15 6 g/dL      Hematocrit 47 4 %      MCV 93 fL      MCH 30 6 pg      MCHC 32 9 g/dL      RDW 12 6 %      MPV 10 5 fL      Platelets 862 Thousands/uL      nRBC 0 /100 WBCs      Neutrophils Relative 71 %      Immat GRANS % 0 %      Lymphocytes Relative 19 %      Monocytes Relative 7 %      Eosinophils Relative 2 %      Basophils Relative 1 %      Neutrophils Absolute 6 84 Thousands/µL      Immature Grans Absolute 0 03 Thousand/uL      Lymphocytes Absolute 1 78 Thousands/µL Monocytes Absolute 0 71 Thousand/µL      Eosinophils Absolute 0 15 Thousand/µL      Basophils Absolute 0 06 Thousands/µL                  CTA head and neck with and without contrast   Final Result by Deep Calvo DO (03/03 1456)      CT brain: No acute intracranial abnormality  CT angiography: No stenosis, occlusion or thrombosis  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 bilaterally with no significant stenosis, dissection or pseudoaneurysm  Workstation performed: [id number]                    Procedures  ECG 12 Lead Documentation Only    Date/Time: 3/3/2023 1:55 PM  Performed by: Jody Kulkarni MD  Authorized by: Jody Kulkarni MD     Previous ECG:     Previous ECG:  Compared to current    Comparison ECG info:  LBBB seen on EKG from [facility] dating back to 2019    Similarity:  No change  Rate:     ECG rate:  64  Rhythm:     Rhythm: sinus rhythm    Conduction:     Conduction: abnormal      Abnormal conduction: complete LBBB               ED Course  ED Course as of 03/03/23 1803   Fri Mar 03, 2023   1508 CTA head and neck with and without contrast  My independent review of imaging:   no intracranial hemorrhage, and vascular concur with radiologist review, no clinically significant obstructions  [id number] Reviewed results with patient at bedside and updated on the plan  ED workup is not revealing any acute abnormality requiring intervention, specifically not vascular lesion, and no intraparenchymal abnormality suggestive of acute or subacute infarct, with symptoms over 48 hours at this time  I am recommending followup with PCP, and to continue symptomatic control with meclizine                                                 Medical Decision Making  Differential includes BPV, central vertigo, CVA, vascular occlusion, hypo or hyperthyroid, cardiac ischemia    Amount and/or Complexity of Data Reviewed  Labs: ordered  Radiology: ordered  Disposition  Final diagnoses:   Vertigo     Time reflects when diagnosis was documented in both MDM as applicable and the Disposition within this note     Time User Action Codes Description Comment    3/3/2023  3:44 PM Tamara Cesar Add [R42] Vertigo       ED Disposition     ED Disposition   Discharge    Condition   Good    Date/Time   Fri Mar 3, 2023  3:44 PM    [address] discharge to home/self care                 Follow-up Information     Follow up With Specialties Details Why Adelina Smith MD Family Medicine Schedule an appointment as soon as possible for a visit  For followup [address]  [address] [place] [id number] [address]  [phone]            Discharge Medication List as of 3/3/2023  3:45 PM      CONTINUE these medications which have NOT CHANGED    Details   Ascorbic Acid (VITAMIN C GUMMIE PO) Take by mouth daily, Historical Med      Calcium Citrate 200 MG TABS One tablet daily, Historical Med      celecoxib (CeleBREX) 200 mg capsule TAKE 1 CAPSULE EVERY DAY BY ORAL ROUTE , Historical Med      cholecalciferol (VITAMIN D3) 1,000 units tablet Take 2,000 Units by mouth daily , Starting Fri 8/8/2014, Historical Med      Cyanocobalamin ER 1000 MCG TBCR Take 1 tablet by mouth daily, Historical Med      cyclobenzaprine (FLEXERIL) 10 mg tablet Take 1 tablet (10 mg total) by mouth daily at bedtime As needed for muscle stiffness, Starting Wed 2/22/2023, Normal      Flaxseed Oil OIL Take 1 capsule by mouth daily , Historical Med      Glucos-Chondroit-Collag-Hyal (GLUCOSAMINE CHONDROIT-COLLAGEN PO) Historical Med      levothyroxine 88 mcg tablet 100 mcg, Historical Med      loratadine (CLARITIN) 10 mg tablet Take 10 mg by mouth daily, Historical Med      Lysine 1000 MG TABS Take 1,000 mg by mouth daily , Historical Med      Magnesium 400 MG TABS Take 1 tablet by mouth daily , Historical Med      omega-3-acid ethyl esters (LOVAZA) 1 g capsule Take 2 g by mouth 2 (two) times a day, Historical Med      pyridoxine (B-6) 100 MG tablet One tablet daily, Historical Med      sodium picosulfate, magnesium oxide, citric acid (Clenpiq) 10-3 5-12 MG-GM -GM/160ML SOLN Take 1 kit by mouth see administration instructions, Starting Thu 12/1/2022, Normal      Turmeric 500 MG CAPS Take 1 tablet by mouth daily , Historical Med      valACYclovir (VALTREX) 1,000 mg tablet Take 1 tablet (1,000 mg total) by mouth 2 (two) times a day for 1 day, Starting Tue 9/29/2020, Normal             No discharge procedures on file      PDMP Review     None          ED Provider  Electronically Signed by           Tip Sanches MD  03/03/23 4295

## 2023-03-03 NOTE — DISCHARGE INSTRUCTIONS
Benign Paroxysmal Positional Vertigo   WHAT YOU NEED TO KNOW:   BPPV is an inner ear condition that causes you to suddenly feel dizzy  Benign means it is not serious or life-threatening  BPPV is caused by a problem with the nerves and structure of your inner ear  BPPV happens when small pieces of calcium break loose and lump together in one of your inner ear canals  DISCHARGE INSTRUCTIONS:   Return to the emergency department if:   You fall during a BPPV episode and are injured  You have a severe headache that does not go away  You have new changes in your vision or feel weak or confused  You have problems hearing, or you have ringing or buzzing in your ears  Contact your healthcare provider if:   Your BPPV symptoms do not go away or they return  You have problems with your balance, or you are falling often  You have new or increased nausea or vomiting with vertigo  You feel anxious or depressed and do not want to leave your home  You have questions or concerns about your condition or care  Prevent your symptoms:   Try to avoid sudden head movements  Stand up and lie down slowly  Raise and support your head when you lie down  Place pillows under your upper back and head or rest in a recliner  Change your position often when you are lying down  Try not to lie with your head on the same side for long periods of time  Roll over slowly

## 2023-03-03 NOTE — PROGRESS NOTES
Chief Complaint   Patient presents with   • Earache   • Dizziness     Right ear  x 2 days      Name: Galen Gill      : 1970      MRN: [id number]  Encounter Provider: Karrie Rodriguez MD  Encounter Date: 3/3/2023   Encounter department: [address] [place] [address]     Very small amount of wax obscuring view of right tympanic membrane  Was moved easily as it was at the entrance of the ear canal   Patient states that she felt dizzy afterwards  Had to lay down  Was unable to ambulate  States she was also feeling nauseous  Reports that a few days ago she had an episode of left-sided chest pain  Patient reports that she did have a bowel movement but did not vomit in the office  Blood sugar 112  Patient with continued dizziness while in office and continued inability to ambulate, recommend patient to be seen in the ER for further work-up and management of dizziness  ADT order placed RTC as needed    1  Dizziness  -     Ambulatory Referral to Physical Therapy; Future  -     Transfer to other facility  -     POCT blood glucose         Subjective      She presents today to discuss right ear pain for the past day  She was recently started on meclizine for dizziness and states that it did help  Denies any hearing loss or headaches  States that the dizziness occurs mostly when she leans forward  Denies any other complaints at this time  Review of Systems   Constitutional: Negative for activity change, appetite change, chills, fatigue and fever  HENT: Positive for ear pain  Negative for congestion, rhinorrhea, sneezing and sore throat  Respiratory: Negative for cough, chest tightness, shortness of breath and wheezing  Cardiovascular: Negative for chest pain and palpitations  Gastrointestinal: Positive for nausea  Negative for abdominal distention, abdominal pain, constipation, diarrhea and vomiting     Musculoskeletal: Negative for arthralgias, back pain, joint swelling and myalgias  Skin: Negative for rash  Neurological: Positive for dizziness  Negative for weakness, numbness and headaches  Psychiatric/Behavioral: Negative for dysphoric mood  All other systems reviewed and are negative  Current Outpatient Medications on File Prior to Visit   Medication Sig   • Ascorbic Acid (VITAMIN C GUMMIE PO) Take by mouth daily   • Calcium Citrate 200 MG TABS One tablet daily   • celecoxib (CeleBREX) 200 mg capsule TAKE 1 CAPSULE EVERY DAY BY ORAL ROUTE  • cholecalciferol (VITAMIN D3) 1,000 units tablet Take 2,000 Units by mouth daily    • Cyanocobalamin ER 1000 MCG TBCR Take 1 tablet by mouth daily   • cyclobenzaprine (FLEXERIL) 10 mg tablet Take 1 tablet (10 mg total) by mouth daily at bedtime As needed for muscle stiffness   • Flaxseed Oil OIL Take 1 capsule by mouth daily    • Glucos-Chondroit-Collag-Hyal (GLUCOSAMINE CHONDROIT-COLLAGEN PO)    • levothyroxine 88 mcg tablet 100 mcg   • Lysine 1000 MG TABS Take 1,000 mg by mouth daily    • Magnesium 400 MG TABS Take 1 tablet by mouth daily    • omega-3-acid ethyl esters (LOVAZA) 1 g capsule Take 2 g by mouth 2 (two) times a day   • pyridoxine (B-6) 100 MG tablet One tablet daily   • sodium picosulfate, magnesium oxide, citric acid (Clenpiq) 10-3 5-12 MG-GM -GM/160ML SOLN Take 1 kit by mouth see administration instructions   • Turmeric 500 MG CAPS Take 1 tablet by mouth daily    • valACYclovir (VALTREX) 1,000 mg tablet Take 1 tablet (1,000 mg total) by mouth 2 (two) times a day for 1 day   • loratadine (CLARITIN) 10 mg tablet Take 10 mg by mouth daily (Patient not taking: Reported on 3/3/2023)       Objective     /82   Temp (!) 97 1 °F (36 2 °C)   Ht 5' 5 5" (1 664 m)   Wt 87 5 kg (193 lb)   BMI 31 63 kg/m²     Physical Exam  Vitals reviewed  Constitutional:       General: She is not in acute distress  Appearance: Normal appearance  She is well-developed  She is not toxic-appearing or diaphoretic  Comments: Dizziness in room  Needing to lay down   HENT:      Head: Normocephalic and atraumatic  Right Ear: Tympanic membrane, ear canal and external ear normal  There is no impacted cerumen  Nose: Nose normal  No congestion or rhinorrhea  Mouth/Throat:      Pharynx: No oropharyngeal exudate or posterior oropharyngeal erythema  Eyes:      General: No scleral icterus  Right eye: No discharge  Left eye: No discharge  Conjunctiva/sclera: Conjunctivae normal    Cardiovascular:      Rate and Rhythm: Normal rate and regular rhythm  Heart sounds: Normal heart sounds  No murmur heard  Pulmonary:      Effort: Pulmonary effort is normal  No respiratory distress  Breath sounds: Normal breath sounds  No wheezing  Abdominal:      General: There is no distension  Palpations: Abdomen is soft  Tenderness: There is no abdominal tenderness  Musculoskeletal:         General: No tenderness  Normal range of motion  Skin:     General: Skin is warm and dry  Coloration: Skin is not pale  Findings: No erythema  Neurological:      Mental Status: She is alert     Psychiatric:         Mood and Affect: Mood normal          Behavior: Behavior normal        Louie Robertson MD

## 2023-03-05 NOTE — PROGRESS NOTES
Assessment/Plan: Treat as likely positional vertigo  Would like to hold on meclizine at this time per her wish  Recommend home and observation  Recommend slow movement and positional changes  Discussed possible VT  she will update me regularly as she has my home cell number  EKG normal sinus rhythm questionable left bundle branch block  No problem-specific Assessment & Plan notes found for this encounter  Diagnoses and all orders for this visit:    Other chest pain  -     POCT ECG        1  Other chest pain  POCT ECG          Subjective:        Patient ID: Sissy Burgos is a 45 y o  female  Chief Complaint   Patient presents with   • Chest Pain   • Dizziness     X  2 days        Positional dizziness over the last 2 3 days  ?  Sharp chest pain left upper chest   Denies any recent lifting injuries  Denies any increasing anxiety      The following portions of the patient's history were reviewed and updated as appropriate: past medical history, past surgical history and problem list       Review of Systems   Constitutional: Negative for diaphoresis, fatigue and fever  HENT: Negative for congestion  Eyes: Negative for visual disturbance  Respiratory: Negative for cough and shortness of breath  Cardiovascular: Positive for chest pain  Negative for palpitations and leg swelling  Gastrointestinal: Negative for abdominal pain  Neurological: Positive for dizziness  Negative for headaches  Psychiatric/Behavioral: The patient is not nervous/anxious  Objective:  /80   Temp (!) 97 1 °F (36 2 °C)   Ht 5' 5 5" (1 664 m)   Wt 90 kg (198 lb 6 4 oz)   SpO2 98%   BMI 32 51 kg/m²        Physical Exam  Vitals and nursing note reviewed  Constitutional:       General: She is not in acute distress  Appearance: Normal appearance  She is not ill-appearing  HENT:      Head: Normocephalic        Right Ear: Tympanic membrane and external ear normal       Left Ear: Tympanic membrane and external ear normal       Nose: No congestion  Mouth/Throat:      Pharynx: Oropharynx is clear  Eyes:      General: No scleral icterus  Pupils: Pupils are equal, round, and reactive to light  Cardiovascular:      Rate and Rhythm: Normal rate and regular rhythm  Heart sounds: Normal heart sounds  No murmur heard  Pulmonary:      Breath sounds: Normal breath sounds  Musculoskeletal:      Right lower leg: No edema  Left lower leg: No edema  Lymphadenopathy:      Cervical: No cervical adenopathy  Skin:     Coloration: Skin is not pale  Neurological:      General: No focal deficit present  Mental Status: She is alert and oriented to person, place, and time  Mental status is at baseline  Cranial Nerves: No cranial nerve deficit  Gait: Gait normal    Psychiatric:         Mood and Affect: Mood normal          Behavior: Behavior normal          Thought Content:  Thought content normal          Judgment: Judgment normal

## 2023-03-06 ENCOUNTER — TELEPHONE (OUTPATIENT)
Dept: FAMILY MEDICINE CLINIC | Facility: CLINIC | Age: 53
End: 2023-03-06

## 2023-03-06 DIAGNOSIS — H81.10 BENIGN PAROXYSMAL POSITIONAL VERTIGO, UNSPECIFIED LATERALITY: Primary | ICD-10-CM

## 2023-03-08 ENCOUNTER — EVALUATION (OUTPATIENT)
Dept: PHYSICAL THERAPY | Facility: REHABILITATION | Age: 53
End: 2023-03-08

## 2023-03-08 DIAGNOSIS — R42 DIZZINESS: ICD-10-CM

## 2023-03-08 NOTE — PROGRESS NOTES
PT Evaluation     Today's date: 3/8/2023  Patient name: Nathan Cesar  : 1970  MRN: [id number]  Referring provider: Lissa Purcell MD  Dx:   Encounter Diagnosis     ICD-10-CM    1  Dizziness  R42 Ambulatory Referral to Physical Therapy                     Assessment  Assessment details: Pt is a 45year old female referred with dizziness  Pt negative for positional testing today  Symptoms with VORx1 and head turns specifically with other testing  Balance WNL  DVA testing abnormal indicating potential vestibular hypofunction  Pt issued and reviewed HEP, communicated understanding  Advised to go to ENT if ear pain persists  Advised to see cardiologist due to LBBB  Pt to follow-up with PT as needed if symptoms return or worsen  Pt does not require PT services at this time  Plan  Plan details: 2 visits in POC and 30 day hold if pt needs a follow-up  Duration in visits: 2  Plan of Care beginning date: 3/8/2023  Plan of Care expiration date: 2023  Treatment plan discussed with: patient        Subjective Evaluation    History of Present Illness  Mechanism of injury: Started a week ago, spontaneously, no injury or specific cause  Dizzy and chest pain  Went to ER on Friday, EKG showed LBBB but not new  Had vertigo in the past, years ago, had therapy for a couple visits and it went away  Feels a lot of pressure in her right ear, does have some pain in her right ear  Still working and driving  Taking Meclizine  Pain  Location: right ear    Social Support  Lives with: spouse    Employment status: working ()    Diagnostic Tests  CT scan: normal  Patient Goals  Patient goal: decrease dizziness        Objective     Concurrent Complaints  Positive for nausea/motion sickness, visual change and aural fullness  Negative for headaches  Neuro Exam:     Dizziness  Positive for disequilibrium, vertigo, oscillopsia and light-headedness       Exacerbating factors  Positive for rolling in bed, turning head, supine to/from sitting and optokinetic movement  Headaches   Patient reports headaches: No        DHI: 50/100 (moderate)    VOMS (Vestibular/Ocular Motor Screen)     Smooth pursuit Normal     Saccades (horizontal) Normal     Saccades (vertical)  Normal     Convergence (near point)--Normal trial 1: 4 cm     VOR (horizontal) Abnormal     VOR (vertical) Abnormal     Visual Motion Sensitivity Normal    Gaze evoked nystagmus (room light): normal all directions    Modified Clinical Test of Sensory Interaction on Balance  30 eyes open firm surface  30 eyes closed firm surface  NT eyes open foam surface  NT eyes closed foam surface    Dynamic Visual Acuity: abnormal; logMAR change: 5 lines    Head Thrust: normal  bilateral    Positional Testing  Sharp-Hope: negative  Vertebral Artery Screen: negative  Cervical AROM:  DixHallpike: Right: negative; Left: negative  Roll Test: Right negative;  Left negative   *all positional testing tested twice, second time with loaded Callaway Hallpike R & L    4 Item Dynamic Gait Index  3/3 Gait level surface  3/3 Change in gait speed  2/3 Gait with horizontal head turns  2/3 Gait with vertical head turns  10/12 total score (less that 10/12 indicates increased risk of fall)       Precautions: unremarkable      Manuals 3/8                                                                Neuro Re-Ed             Walking with HT, H/V reviewed for HEP            VORx1, H/V standing reviewed for HEP            Semi tandem EC ground reviewed for HEP                                                                Ther Ex                                                                                                                     Ther Activity                                       Gait Training                                       Modalities

## 2023-03-28 ENCOUNTER — OFFICE VISIT (OUTPATIENT)
Dept: FAMILY MEDICINE CLINIC | Facility: CLINIC | Age: 53
End: 2023-03-28

## 2023-03-28 VITALS
DIASTOLIC BLOOD PRESSURE: 72 MMHG | HEIGHT: 66 IN | TEMPERATURE: 97.9 F | SYSTOLIC BLOOD PRESSURE: 110 MMHG | WEIGHT: 199.6 LBS | BODY MASS INDEX: 32.08 KG/M2

## 2023-03-28 DIAGNOSIS — N30.00 ACUTE CYSTITIS WITHOUT HEMATURIA: Primary | ICD-10-CM

## 2023-03-28 LAB
SL AMB  POCT GLUCOSE, UA: ABNORMAL
SL AMB LEUKOCYTE ESTERASE,UA: ABNORMAL
SL AMB POCT BILIRUBIN,UA: NEGATIVE
SL AMB POCT BLOOD,UA: NEGATIVE
SL AMB POCT CLARITY,UA: CLEAR
SL AMB POCT COLOR,UA: YELLOW
SL AMB POCT KETONES,UA: ABNORMAL
SL AMB POCT NITRITE,UA: NEGATIVE
SL AMB POCT PH,UA: 5
SL AMB POCT SPECIFIC GRAVITY,UA: 1
SL AMB POCT URINE PROTEIN: NEGATIVE
SL AMB POCT UROBILINOGEN: 0.2

## 2023-03-28 RX ORDER — PHENAZOPYRIDINE HYDROCHLORIDE 100 MG/1
100 TABLET, FILM COATED ORAL 3 TIMES DAILY PRN
Qty: 10 TABLET | Refills: 0 | Status: SHIPPED | OUTPATIENT
Start: 2023-03-28

## 2023-03-28 RX ORDER — NITROFURANTOIN 25; 75 MG/1; MG/1
100 CAPSULE ORAL 2 TIMES DAILY
Qty: 10 CAPSULE | Refills: 0 | Status: CANCELLED | OUTPATIENT
Start: 2023-03-28 | End: 2023-04-02

## 2023-03-28 RX ORDER — SULFAMETHOXAZOLE AND TRIMETHOPRIM 800; 160 MG/1; MG/1
1 TABLET ORAL 2 TIMES DAILY
Qty: 6 TABLET | Refills: 0 | Status: SHIPPED | OUTPATIENT
Start: 2023-03-28 | End: 2023-03-31

## 2023-03-28 NOTE — PROGRESS NOTES
Chief Complaint   Patient presents with   • Follow-up     Pt first 1 wk ago  UTI      Name: Moise Wilson      : 1970      MRN: [id number]  Encounter Provider: Sherley Alves MD  Encounter Date: 3/28/2023   Encounter department: [facility]    Assessment & Plan     UA in office did reveal leukocyte esterase  We will treat with Bactrim, she was previously treated with Macrobid  We will send urine culture  We will also send Pyridium for symptomatic relief  If patient does not have any improvement, will consider sending to urology  Increase p o  hydration in the meantime  RTC as needed otherwise    1  Acute cystitis without hematuria  -     sulfamethoxazole-trimethoprim (BACTRIM DS) 800-160 mg per tablet; Take 1 tablet by mouth 2 (two) times a day for 3 days  -     phenazopyridine (PYRIDIUM) 100 mg tablet; Take 1 tablet (100 mg total) by mouth 3 (three) times a day as needed for bladder spasms  -     POCT urine dip  -     Urine culture; Future  -     Urine culture         Subjective      She presents today to discuss urinary pressure and increased frequency  She was recently treated for UTI  States that she continues to have symptoms  Denies any burning on urination at this time or back pain  Review of Systems   Constitutional: Negative for activity change, appetite change, chills, fatigue and fever  HENT: Negative for congestion, rhinorrhea, sneezing and sore throat  Eyes: Negative for pain, discharge, redness and itching  Respiratory: Negative for cough, chest tightness, shortness of breath and wheezing  Cardiovascular: Negative for chest pain and palpitations  Gastrointestinal: Negative for abdominal distention, abdominal pain, constipation, diarrhea, nausea and vomiting  Genitourinary: Positive for frequency and urgency  Negative for decreased urine volume, difficulty urinating, dysuria, flank pain, hematuria, vaginal discharge and vaginal pain  "  Musculoskeletal: Negative for arthralgias, back pain, joint swelling and myalgias  Skin: Negative for rash  Neurological: Negative for dizziness, weakness, numbness and headaches  Hematological: Negative for adenopathy  Psychiatric/Behavioral: Negative for confusion and dysphoric mood  All other systems reviewed and are negative  Current Outpatient Medications on File Prior to Visit   Medication Sig   • Ascorbic Acid (VITAMIN C GUMMIE PO) Take by mouth daily   • Calcium Citrate 200 MG TABS One tablet daily   • celecoxib (CeleBREX) 200 mg capsule TAKE 1 CAPSULE EVERY DAY BY ORAL ROUTE  • cholecalciferol (VITAMIN D3) 1,000 units tablet Take 2,000 Units by mouth daily    • cyclobenzaprine (FLEXERIL) 10 mg tablet Take 1 tablet (10 mg total) by mouth daily at bedtime As needed for muscle stiffness   • Flaxseed Oil OIL Take 1 capsule by mouth daily    • Glucos-Chondroit-Collag-Hyal (GLUCOSAMINE CHONDROIT-COLLAGEN PO)    • levothyroxine 88 mcg tablet 100 mcg   • Lysine 1000 MG TABS Take 1,000 mg by mouth daily    • Magnesium 400 MG TABS Take 1 tablet by mouth daily    • omega-3-acid ethyl esters (LOVAZA) 1 g capsule Take 2 g by mouth 2 (two) times a day   • pyridoxine (B-6) 100 MG tablet One tablet daily   • sodium picosulfate, magnesium oxide, citric acid (Clenpiq) 10-3 5-12 MG-GM -GM/160ML SOLN Take 1 kit by mouth see administration instructions   • Turmeric 500 MG CAPS Take 1 tablet by mouth daily    • valACYclovir (VALTREX) 1,000 mg tablet Take 1 tablet (1,000 mg total) by mouth 2 (two) times a day for 1 day   • Cyanocobalamin ER 1000 MCG TBCR Take 1 tablet by mouth daily (Patient not taking: Reported on 3/28/2023)   • loratadine (CLARITIN) 10 mg tablet Take 10 mg by mouth daily (Patient not taking: Reported on 3/3/2023)       Objective     /72   Temp 97 9 °F (36 6 °C)   Ht 5' 5 5\" (1 664 m)   Wt 90 5 kg (199 lb 9 6 oz)   BMI 32 71 kg/m²     Physical Exam  Vitals reviewed     Constitutional: " General: She is not in acute distress  Appearance: Normal appearance  She is well-developed  She is not toxic-appearing or diaphoretic  HENT:      Head: Normocephalic and atraumatic  Nose: Nose normal  No congestion or rhinorrhea  Mouth/Throat:      Pharynx: No oropharyngeal exudate  Eyes:      General: No scleral icterus  Right eye: No discharge  Left eye: No discharge  Conjunctiva/sclera: Conjunctivae normal    Cardiovascular:      Rate and Rhythm: Normal rate and regular rhythm  Pulses: Normal pulses  Heart sounds: Normal heart sounds  No murmur heard  Pulmonary:      Effort: Pulmonary effort is normal  No respiratory distress  Breath sounds: Normal breath sounds  No wheezing  Abdominal:      General: There is no distension  Palpations: Abdomen is soft  Tenderness: There is no abdominal tenderness  Musculoskeletal:         General: No tenderness  Normal range of motion  Lymphadenopathy:      Cervical: No cervical adenopathy  Skin:     General: Skin is warm and dry  Coloration: Skin is not pale  Findings: No erythema  Neurological:      General: No focal deficit present  Mental Status: She is alert  Motor: No weakness     Psychiatric:         Mood and Affect: Mood normal          Behavior: Behavior normal        Jasbir Antonio MD

## 2023-03-30 LAB — BACTERIA UR CULT: NORMAL

## 2023-04-24 ENCOUNTER — TELEPHONE (OUTPATIENT)
Dept: FAMILY MEDICINE CLINIC | Facility: CLINIC | Age: 53
End: 2023-04-24

## 2023-04-24 DIAGNOSIS — R94.31 ABNORMAL EKG: Primary | ICD-10-CM

## 2023-04-24 NOTE — TELEPHONE ENCOUNTER
I spoke to the patient, she is asymptomatic, but the physician who read the EKG at the hospital did suggest she have a follow up with cardiology  Patient has scheduled an appointment to see Dr Maritza Alva on 5/19 and is asking if you can place a referral for this appointment    Thank you

## 2023-04-24 NOTE — TELEPHONE ENCOUNTER
Patient would like to follow up with cardiology regarding her previous abnormal EKG    She is asking if you can please make a recommendation and place appropriate referral, thank you

## 2023-05-18 ENCOUNTER — OFFICE VISIT (OUTPATIENT)
Dept: CARDIOLOGY CLINIC | Facility: CLINIC | Age: 53
End: 2023-05-18

## 2023-05-18 VITALS
DIASTOLIC BLOOD PRESSURE: 80 MMHG | HEART RATE: 73 BPM | WEIGHT: 200 LBS | BODY MASS INDEX: 32.78 KG/M2 | SYSTOLIC BLOOD PRESSURE: 120 MMHG

## 2023-05-18 DIAGNOSIS — I44.7 LEFT BUNDLE BRANCH BLOCK: Primary | ICD-10-CM

## 2023-05-18 DIAGNOSIS — R07.2 PRECORDIAL CHEST PAIN: ICD-10-CM

## 2023-05-18 DIAGNOSIS — R42 VERTIGO: ICD-10-CM

## 2023-05-18 NOTE — PROGRESS NOTES
[facility]  [address]  [id number]  Phone#  [phone]   Fax#  [phone]  *-*-*-*-*-*-*-*-*-*-*-*-*-*-*-*-*-*-*-*-*-*-*-*-*-*-*-*-*-*-*-*-*-*-*-*-*-*-*-*-*-*-*-*-*-*-*-*-*-*-*-*-*-*                                              Cardiology Consultation       ENCOUNTER DATE: 23 1:43 PM  PATIENT NAME: Piedad Zacairas   : 1970    MRN: [id number]  AGE:52 y o  SEX: female  [address] Crowell MD     PRIMARY CARE PHYSICIAN: Felisha Mejia DO    ACTIVE DIAGNOSIS THIS VISIT  1  Left bundle branch block  POCT ECG    Echo complete w/ contrast if indicated      2  Precordial chest pain  POCT ECG      3  Vertigo          ACTIVE PROBLEM LIST  Patient Active Problem List   Diagnosis   • Abnormal fasting glucose   • Herpes simplex type 1 infection   • History of thyroid cancer   • Lumbar degenerative disc disease   • Papillary carcinoma of thyroid (HCC)   • Postoperative hypothyroidism   • Seasonal allergies   • Vitamin D deficiency   • Left bundle branch block   • Precordial chest pain   • Vertigo       CARDIOLOGY SPECIALTY COMMENTS  Patient referred to cardiology by  Felisha Mejia DO following ED visit in which patient had an abnormal EKG and referral to cardiology was recommended by the ED practitioner  EKG demonstrates sinus rhythm with left bundle branch block pattern  According to ED physician, this is unchanged since 2019 although I cannot find previous EKGs in epic or [place]  3/3/2023 [facility] ED 41-year-old F with sharp left-sided chest pain exacerbated by moving her arm and associated with dizziness, feeling off balance, woozy with nausea  Was given meclizine by PCP which made her symptoms better but made her sleepy    Examination in the ED demonstrated an abnormal gait which was not acute ataxia but patient preferred to walk slow possibly slightly wide stance due to subjective sensation of being off balance    3/3/2023 CTA of the head and neck no acute intracranial abnormality, no stenosis occlusion or thrombosis mild fibromuscular dysplasia involving the distal aspect of the cervical internal carotid artery bilaterally with no stenosis, dissection or pseudoaneurysm    HPI: Patient has had chest discomfort which for the most part has been a pressure or squeezing sensation substernally  She has had some sharp pains also  These episodes occur at random and sometimes may be related to exercising earlier in the day or the previous day  The discomfort lasts for several hours to several days  They are not associated with other symptoms  Patient is a lifetime non-smoker  There is a family history of diabetes but not heart disease  Patient has a left bundle branch block pattern on EKG  She just recently learned about it but has had it previously but was not told  The oldest EKG I can find is from 2019 and that demonstrates a left bundle branch block pattern  Not certain when this occurred or if she had it from birth  Left bundle branch block patterns are more often acquired than congenital but are related to conduction disease more than other etiologies  For the last several years, patient has had increasing degrees of lower extremity edema  She does wear support stockings and tries to reduce her sodium intake  She is concerned that the lower extremity edema may reflect heart failure  DISCUSSION/PLAN:       · Obtain an echocardiogram and depending on her left ventricular systolic function, diastolic function and right ventricular function, decide on what course would be best for further evaluation    · Return in 6 weeks      Lab Studies:    Lab Results   Component Value Date    CHOLESTEROL 215 (H) 10/02/2021    CHOLESTEROL 194 03/07/2020    CHOLESTEROL 217 (H) 12/30/2019     Lab Results   Component Value Date    TRIG 88 10/02/2021    TRIG 90 03/07/2020    TRIG 171 (H) 12/30/2019     Lab Results   Component Value Date    HDL 42 10/02/2021    HDL 41 03/07/2020    HDL 44 12/30/2019     Lab Results   Component Value Date    LDLCALC 155 (H) 10/02/2021    LDLCALC 135 (H) 03/07/2020    LDLCALC 139 (H) 12/30/2019     Lab Results   Component Value Date    NTBNP 64 03/07/2020     Lab Results   Component Value Date    HGBA1C 5 4 10/01/2022      Lab Results   Component Value Date    EGFR 83 03/03/2023    EGFR 89 10/01/2022    EGFR 84 10/02/2021    SODIUM 140 03/03/2023    SODIUM 139 10/01/2022    SODIUM 140 10/02/2021    K 4 2 03/03/2023    K 4 3 10/01/2022    K 4 3 10/02/2021     03/03/2023     10/01/2022     10/02/2021    CO2 29 03/03/2023    CO2 31 10/01/2022    CO2 29 10/02/2021    ANIONGAP 8 05/03/2015    BUN 17 03/03/2023    BUN 13 10/01/2022    BUN 15 10/02/2021    CREATININE 0 80 03/03/2023    CREATININE 0 76 10/01/2022    CREATININE 0 80 10/02/2021     Lab Results   Component Value Date    WBC 9 57 03/03/2023    WBC 6 30 10/02/2021    WBC 6 38 12/30/2019    HGB 15 6 (H) 03/03/2023    HGB 15 0 10/02/2021    HGB 15 2 12/30/2019    HCT 47 4 (H) 03/03/2023    HCT 46 6 (H) 10/02/2021    HCT 47 7 (H) 12/30/2019    MCV 93 03/03/2023    MCV 95 10/02/2021    MCV 95 12/30/2019    MCH 30 6 03/03/2023    MCH 30 5 10/02/2021    MCH 30 1 12/30/2019    MCHC 32 9 03/03/2023    MCHC 32 2 10/02/2021    MCHC 31 9 12/30/2019     03/03/2023     10/02/2021     12/30/2019        Lab Results   Component Value Date    GLUCOSE 92 05/03/2015    CALCIUM 10 1 03/03/2023    CALCIUM 9 5 10/01/2022    CALCIUM 9 2 10/02/2021    AST 20 10/02/2021    AST 19 03/07/2020    AST 28 12/30/2019    ALT 49 10/02/2021    ALT 43 03/07/2020    ALT 66 12/30/2019    ALKPHOS 78 10/02/2021    ALKPHOS 79 03/07/2020    ALKPHOS 79 12/30/2019    PROT 6 9 05/03/2015    BILITOT 0 6 05/03/2015       Lab Results   Component Value Date    FERRITIN 297 12/30/2019     Results for orders placed or performed in visit on 05/18/23   POCT ECG    Narrative    Normal sinus rhythm at a rate of 73 bpm   Left bundle branch block pattern with left axis deviation and secondary ST-T wave abnormalities  Abnormal EKG           Current Outpatient Medications:   •  Ascorbic Acid (VITAMIN C GUMMIE PO), Take by mouth daily, Disp: , Rfl:   •  Calcium Citrate 200 MG TABS, One tablet daily, Disp: , Rfl:   •  cholecalciferol (VITAMIN D3) 1,000 units tablet, Take 2,000 Units by mouth daily , Disp: , Rfl:   •  Flaxseed Oil OIL, Take 1 capsule by mouth daily , Disp: , Rfl:   •  Glucos-Chondroit-Collag-Hyal (GLUCOSAMINE CHONDROIT-COLLAGEN PO), , Disp: , Rfl:   •  levothyroxine 88 mcg tablet, 100 mcg, Disp: , Rfl:   •  Lysine 1000 MG TABS, Take 1,000 mg by mouth daily , Disp: , Rfl:   •  Magnesium 400 MG TABS, Take 1 tablet by mouth daily , Disp: , Rfl:   •  Multiple Vitamins-Minerals (ZINC PO), Take by mouth, Disp: , Rfl:   •  pyridoxine (B-6) 100 MG tablet, One tablet daily, Disp: , Rfl:   •  Turmeric 500 MG CAPS, Take 1 tablet by mouth daily , Disp: , Rfl:   •  celecoxib (CeleBREX) 200 mg capsule, TAKE 1 CAPSULE EVERY DAY BY ORAL ROUTE  (Patient not taking: Reported on 5/18/2023), Disp: , Rfl:   •  Cyanocobalamin ER 1000 MCG TBCR, Take 1 tablet by mouth daily (Patient not taking: Reported on 3/28/2023), Disp: , Rfl:   •  cyclobenzaprine (FLEXERIL) 10 mg tablet, Take 1 tablet (10 mg total) by mouth daily at bedtime As needed for muscle stiffness (Patient not taking: Reported on 5/18/2023), Disp: 20 tablet, Rfl: 0  •  loratadine (CLARITIN) 10 mg tablet, Take 10 mg by mouth daily (Patient not taking: Reported on 3/3/2023), Disp: , Rfl:   •  omega-3-acid ethyl esters (LOVAZA) 1 g capsule, Take 2 g by mouth 2 (two) times a day (Patient not taking: Reported on 5/18/2023), Disp: , Rfl:   •  phenazopyridine (PYRIDIUM) 100 mg tablet, Take 1 tablet (100 mg total) by mouth 3 (three) times a day as needed for bladder spasms (Patient not taking: Reported on 5/18/2023), Disp: 10 tablet, Rfl: 0  •  sodium picosulfate, magnesium "oxide, citric acid (Clenpiq) 10-3 5-12 MG-GM -GM/160ML SOLN, Take 1 kit by mouth see administration instructions (Patient not taking: Reported on 5/18/2023), Disp: 160 mL, Rfl: 0  •  valACYclovir (VALTREX) 1,000 mg tablet, Take 1 tablet (1,000 mg total) by mouth 2 (two) times a day for 1 day (Patient not taking: Reported on 5/18/2023), Disp: 4 tablet, Rfl: 1  Allergies   Allergen Reactions   • Bee Venom Anaphylaxis   • Dust Mite Extract    • Gabapentin Dizziness     Annotation - [id number]: \"felt like out of body experience\"   • Molds & Smuts Sneezing   • Other      Annotation - [id number]: seasonal, Trees   • Pollen Extract    • Bernard Grass Pollen Allergen    • Latex Rash       Past Medical History:   Diagnosis Date   • Cervical neuritis 05/16/2013    C7- T1 Left, Last assessed   • Endometriosis, cervix    • GERD (gastroesophageal reflux disease)    • Herpes    • Hiatal hernia     R INGUINAL   • Hypothyroidism    • [facility]     THYROID     Social History     Socioeconomic History   • Marital status: /Civil Union     Spouse name: Not on file   • Number of children: Not on file   • Years of education: Not on file   • Highest education level: Not on file   Occupational History   • Occupation:    Tobacco Use   • Smoking status: Never   • Smokeless tobacco: Never   Vaping Use   • Vaping Use: Never used   Substance and Sexual Activity   • Alcohol use: Not Currently     Comment: socially   • Drug use: No   • Sexual activity: Not Currently   Other Topics Concern   • Not on file   Social History Narrative   • Not on file     Social Determinants of Health     Financial Resource Strain: Not on file   Food Insecurity: Not on file   Transportation Needs: Not on file   Physical Activity: Not on file   Stress: Not on file   Social Connections: Not on file   Intimate Partner Violence: Not on file   Housing Stability: Not on file      Family History   Problem Relation Age of Onset   • Thyroid " disease Mother    • Cancer Father    • Colon polyps Father    • Thyroid disease Sister    • Thyroid disease Brother    • Diabetes Maternal Grandmother      Past Surgical History:   Procedure Laterality Date   • CHOLECYSTECTOMY  05/2020   • COLONOSCOPY  12/2015 2015  Sharyn King  Normal exam, hemorrhoids  • COLONOSCOPY  2010    Dr Sharyn King  Normal exam, hemorrhoids  • Hillary Dawson  Normal exam   • HERNIA REPAIR     • HYSTERECTOMY  2005    has ovaries   • NM LAPAROSCOPY SURG CHOLECYSTECTOMY N/A 05/23/2019    Procedure: LAP  ZANE , IOCG;  Surgeon: Kristofer Rogers MD;  Location: [facility];  Service: General   • THYROIDECTOMY      early 2008 [place]   • TUBAL LIGATION         Review of Systems:  Review of Systems   Constitutional: Negative  HENT: Negative  Respiratory: Negative for chest tightness and shortness of breath  Cardiovascular: Negative for chest pain and leg swelling  Gastrointestinal: Negative  Endocrine: Negative  Genitourinary: Negative  Musculoskeletal: Negative  Skin: Negative  Allergic/Immunologic: Negative  Neurological: Negative  Hematological: Negative  Psychiatric/Behavioral: Negative  Physical Exam:  /80 (BP Location: Left arm, Patient Position: Sitting, Cuff Size: Adult)   Pulse 73   Wt 90 7 kg (200 lb)   BMI 32 78 kg/m²     PREVIOUS WEIGHTS:   Wt Readings from Last 10 Encounters:   05/18/23 90 7 kg (200 lb)   03/28/23 90 5 kg (199 lb 9 6 oz)   03/03/23 87 9 kg (193 lb 12 6 oz)   03/03/23 87 5 kg (193 lb)   03/01/23 90 kg (198 lb 6 4 oz)   02/22/23 89 3 kg (196 lb 12 8 oz)   12/01/22 88 kg (194 lb)   06/09/22 83 6 kg (184 lb 3 2 oz)   12/29/21 85 5 kg (188 lb 6 4 oz)   10/19/21 83 4 kg (183 lb 12 8 oz)      Physical Exam  Constitutional:       General: She is not in acute distress  Appearance: She is well-developed  HENT:      Head: Normocephalic and atraumatic  Neck:      Thyroid: No thyromegaly        Vascular: No "carotid bruit or JVD  Trachea: No tracheal deviation  Cardiovascular:      Rate and Rhythm: Normal rate and regular rhythm  Pulses: Normal pulses  Heart sounds: Normal heart sounds  No murmur heard  No friction rub  No gallop  Pulmonary:      Effort: Pulmonary effort is normal  No respiratory distress  Breath sounds: Normal breath sounds  No wheezing, rhonchi or rales  Chest:      Chest wall: No tenderness  Abdominal:      General: Bowel sounds are normal  There is no distension  Palpations: Abdomen is soft  Tenderness: There is no abdominal tenderness  Musculoskeletal:         General: Normal range of motion  Cervical back: Normal range of motion and neck supple  Comments: Trace bilateral edema at most   Patient is wearing support stockings and does eat that there is more swelling in her ankle and feet which were not examined  Skin:     General: Skin is warm and dry  Neurological:      General: No focal deficit present  Mental Status: She is alert and oriented to person, place, and time  Gait: Gait normal    Psychiatric:         Mood and Affect: Mood normal          Behavior: Behavior normal          Thought Content: Thought content normal          Judgment: Judgment normal      ======================================================   I have personally reviewed pertinent reports  I spent 50 minutes on the patient's office visit  This time was spent on the day of the visit  I had direct contact with the patient in the office on the day of the visit  Greater than 50% of the total time was spent obtaining a history, examining patient, answering all patient questions, arranging and discussing plan of care with patient as well as directly providing instructions  Additional time then spent on orders and office chart  Portions of the record may have been created with voice recognition software   Occasional wrong word or \"sound a like\" " substitutions may have occurred due to the inherent limitations of voice recognition software  Read the chart carefully and recognize, using context, where substitutions have occurred      SIGNATURES:   Raul Goldberg MD

## 2023-05-20 ENCOUNTER — APPOINTMENT (OUTPATIENT)
Dept: LAB | Age: 53
End: 2023-05-20

## 2023-05-20 DIAGNOSIS — E78.00 PURE HYPERCHOLESTEROLEMIA: ICD-10-CM

## 2023-05-20 LAB
ALBUMIN SERPL BCP-MCNC: 3.8 G/DL (ref 3.5–5)
ALP SERPL-CCNC: 84 U/L (ref 46–116)
ALT SERPL W P-5'-P-CCNC: 91 U/L (ref 12–78)
ANION GAP SERPL CALCULATED.3IONS-SCNC: 2 MMOL/L (ref 4–13)
AST SERPL W P-5'-P-CCNC: 40 U/L (ref 5–45)
BILIRUB SERPL-MCNC: 0.56 MG/DL (ref 0.2–1)
BUN SERPL-MCNC: 12 MG/DL (ref 5–25)
CALCIUM SERPL-MCNC: 9.1 MG/DL (ref 8.3–10.1)
CHLORIDE SERPL-SCNC: 110 MMOL/L (ref 96–108)
CHOLEST SERPL-MCNC: 238 MG/DL
CO2 SERPL-SCNC: 28 MMOL/L (ref 21–32)
CREAT SERPL-MCNC: 0.75 MG/DL (ref 0.6–1.3)
GFR SERPL CREATININE-BSD FRML MDRD: 90 ML/MIN/1.73SQ M
GLUCOSE P FAST SERPL-MCNC: 95 MG/DL (ref 65–99)
HDLC SERPL-MCNC: 41 MG/DL
LDLC SERPL CALC-MCNC: 170 MG/DL (ref 0–100)
NONHDLC SERPL-MCNC: 197 MG/DL
POTASSIUM SERPL-SCNC: 4 MMOL/L (ref 3.5–5.3)
PROT SERPL-MCNC: 7.1 G/DL (ref 6.4–8.4)
SODIUM SERPL-SCNC: 140 MMOL/L (ref 135–147)
TRIGL SERPL-MCNC: 135 MG/DL

## 2023-06-14 ENCOUNTER — HOSPITAL ENCOUNTER (OUTPATIENT)
Dept: NON INVASIVE DIAGNOSTICS | Facility: HOSPITAL | Age: 53
Discharge: HOME | End: 2023-06-14
Attending: INTERNAL MEDICINE
Payer: COMMERCIAL

## 2023-06-14 VITALS
DIASTOLIC BLOOD PRESSURE: 80 MMHG | BODY MASS INDEX: 33.32 KG/M2 | SYSTOLIC BLOOD PRESSURE: 120 MMHG | HEART RATE: 73 BPM | HEIGHT: 65 IN | WEIGHT: 200 LBS

## 2023-06-14 DIAGNOSIS — I44.7 LEFT BUNDLE BRANCH BLOCK: ICD-10-CM

## 2023-06-14 LAB
AORTIC ROOT: 2.2 CM
APICAL FOUR CHAMBER EJECTION FRACTION: 51 %
E WAVE DECELERATION TIME: 160 MS
FRACTIONAL SHORTENING: 35 (ref 28–44)
GLOBAL LONGITUIDAL STRAIN: -17 %
INTERVENTRICULAR SEPTUM IN DIASTOLE (PARASTERNAL SHORT AXIS VIEW): 0.7 CM
INTERVENTRICULAR SEPTUM: 0.7 CM (ref 0.6–1.1)
LAAS-AP2: 15.6 CM2
LAAS-AP4: 15 CM2
LEFT ATRIUM SIZE: 3 CM
LEFT INTERNAL DIMENSION IN SYSTOLE: 3 CM (ref 2.1–4)
LEFT VENTRICLE DIASTOLIC VOLUME (MOD BIPLANE): 90 ML
LEFT VENTRICLE SYSTOLIC VOLUME (MOD BIPLANE): 40 ML
LEFT VENTRICULAR INTERNAL DIMENSION IN DIASTOLE: 4.6 CM (ref 3.5–6)
LEFT VENTRICULAR POSTERIOR WALL IN END DIASTOLE: 0.6 CM
LEFT VENTRICULAR STROKE VOLUME: 62 ML
LV EF: 56 %
LVSV (TEICH): 62 ML
MV E'TISSUE VEL-SEP: 9 CM/S
MV PEAK A VEL: 0.7 M/S
MV PEAK E VEL: 85 CM/S
RA PRESSURE ESTIMATED: 3 MMHG
RIGHT ATRIAL 2D VOLUME: 23 ML
RIGHT ATRIUM AREA SYSTOLE A4C: 11 CM2
RIGHT VENTRICLE ID DIMENSION: 3.4 CM
RV PSP: 22 MMHG
SL CV LEFT ATRIUM LENGTH A2C: 4.7 CM
SL CV LV EF: 52
SL CV PED ECHO LEFT VENTRICLE DIASTOLIC VOLUME (MOD BIPLANE) 2D: 97 ML
SL CV PED ECHO LEFT VENTRICLE SYSTOLIC VOLUME (MOD BIPLANE) 2D: 35 ML
TR MAX PG: 19 MMHG
TR PEAK VELOCITY: 2.2 M/S
TRICUSPID ANNULAR PLANE SYSTOLIC EXCURSION: 2.2 CM
TRICUSPID VALVE PEAK REGURGITATION VELOCITY: 2.18 M/S

## 2023-06-14 PROCEDURE — 93306 TTE W/DOPPLER COMPLETE: CPT

## 2023-06-14 PROCEDURE — 93356 MYOCRD STRAIN IMG SPCKL TRCK: CPT

## 2023-06-15 ENCOUNTER — TELEPHONE (OUTPATIENT)
Dept: FAMILY MEDICINE CLINIC | Facility: CLINIC | Age: 53
End: 2023-06-15

## 2023-06-15 DIAGNOSIS — E78.00 PURE HYPERCHOLESTEROLEMIA: Primary | ICD-10-CM

## 2023-07-03 ENCOUNTER — OFFICE VISIT (OUTPATIENT)
Dept: FAMILY MEDICINE CLINIC | Facility: CLINIC | Age: 53
End: 2023-07-03
Payer: COMMERCIAL

## 2023-07-03 VITALS
BODY MASS INDEX: 32.15 KG/M2 | DIASTOLIC BLOOD PRESSURE: 78 MMHG | HEIGHT: 65 IN | WEIGHT: 193 LBS | SYSTOLIC BLOOD PRESSURE: 124 MMHG

## 2023-07-03 DIAGNOSIS — M70.62 TROCHANTERIC BURSITIS OF LEFT HIP: Primary | ICD-10-CM

## 2023-07-03 PROBLEM — M70.61 TROCHANTERIC BURSITIS OF RIGHT HIP: Status: ACTIVE | Noted: 2023-07-03

## 2023-07-03 PROCEDURE — 99213 OFFICE O/P EST LOW 20 MIN: CPT | Performed by: FAMILY MEDICINE

## 2023-07-03 RX ORDER — PREDNISONE 10 MG/1
TABLET ORAL
Qty: 30 TABLET | Refills: 0 | Status: SHIPPED | OUTPATIENT
Start: 2023-07-03

## 2023-07-03 NOTE — PATIENT INSTRUCTIONS
Hip Bursitis Exercises   AMBULATORY CARE:   Hip bursitis exercises  help strengthen the muscles in your hip and keep the joint flexible. Strong muscles can help reduce pain, prevent injury, and keep the joint stable. The exercises can also help increase the range of motion in your hip joint. Call your doctor or physical therapist if:   You have sharp pain during exercise or at rest.    You have questions or concerns about the stretches or exercises. What you need to know about exercise safety:   Move slowly and smoothly. Avoid fast or jerky motions. This will help prevent an injury. Breathe normally. Do not hold your breath. It is important to breathe in and out so you do not tense up during exercise. Tension could prevent you from moving your joint in a full range of motion. Do the exercises and stretches on both legs. Do this so both hips remain strong and flexible. Stop if you feel sharp pain or an increase in pain. Contact your healthcare provider or physical therapist. It is normal to feel some discomfort during exercise. Regular exercise will help decrease your discomfort over time. Warm up and cool down when you exercise. Walk or ride a stationary bike for 5 to 10 minutes before you start. This warms and relaxes your muscles to help prevent an injury. When you have finished the exercises, cool down by walking in place for a few minutes. You may also need to stretch as part of your warm up or cool down routine. Your provider or physical therapist will tell you which stretches to do. How to do hip stretches: Your healthcare provider or physical therapist will tell you how many times to do each stretch. Do the stretch on both sides before you move to the next stretch. Standing iliotibial band stretch:  Stand with the leg on your injured side behind your other leg. Bend sideways toward the side that is not injured. Stop when you feel a stretch in your outer hip.  Hold for 5 to 10 seconds. Then return to the starting position. Lying iliotibial band stretch:  Lie on your back. Bend the knee on your injured side toward your chest. Place your hands on the outside of your knee and thigh. Slowly pull the knee across your body. Stop when you feel a stretch in your hip and outer thigh. Hold for 5 to 10 seconds. Return your leg to the starting position. Hip stretch:  Lie on your back with both legs straight and on the ground. Bend the knee on your injured side toward your chest until you can reach your lower leg. Place both hands on your shin and pull your knee toward your chest. Hold for 5 to 10 seconds. Return your leg to the starting position. Knee to chest:  Lie on your back with both knees bent and feet flat on the floor. Bend the knee on your injured side toward your chest until you can reach your lower leg. Place both hands on your shin and pull your knee toward your chest. Hold for 5 to 10 seconds. Return your leg to the starting position. Internal hip rotator stretch:  You will do this exercise on a table. Lie on your side with the injured hip on top. You may be told to keep a pillow between your thighs. Move the top leg so the foot hangs below the edge of the table. Rotate your hip to raise your foot in the opposite direction of the bottom shoulder. Raise your foot as high as you can so you feel a stretch in the back of your thigh. Hold for 5 seconds. Then slowly lower your foot to the starting position. External hip rotator stretch:  You will do this exercise on a table. Lie on your side with the injured hip on the bottom. You do not need a pillow between your thighs for this exercise. Move the bottom leg so the foot is off the edge of the table. Rotate your hip to lift the foot in the opposite direction of the bottom shoulder. Raise your foot as high as you can so you feel a stretch in your buttock. Hold for 5 seconds.  Then slowly lower your foot to the starting position. Kneeling hip flexor stretch:  Kneel on your knee on the injured side. Place the foot of your other leg on the floor so the knee is bent. Put both hands on top of your thigh. Keep your back straight and abdominal muscles tight. Lean forward until you feel a stretch in your other thigh. Hold the stretch for 10 seconds. Return to the starting position. How to do hip strengthening exercises: Your healthcare provider or physical therapist will tell you how many times to do each exercise. Do the exercise on both sides before you move to the next exercise. Straight leg lift to the side: This may also be called hip abduction. Lie on your side with straight legs, with the injured hip on top. Slowly raise your top leg toward the ceiling as high as you can. Keep your foot pointed. Hold for 5 seconds. Then slowly lower your leg to the starting position. Inner thigh lift: This may also be called hip adduction. Lie on your side with straight legs, with the injured hip on the bottom. Cross your top leg over your bottom leg. Put the foot of your top leg on the floor in front of you. Raise your bottom leg until it touches the top leg. Hold for 5 seconds. Then slowly lower the leg to the floor. Clam exercise:  Lie on your side so your injured side is on top. Bend your knees. Keep your heels together during this exercise. Slowly raise your top knee toward the ceiling. Then lower your leg so your knees are together. Follow up with your doctor or physical therapist as directed:  Write down your questions so you remember to ask them during your visits. © Copyright [employer] 2022 Information is for End User's use only and may not be sold, redistributed or otherwise used for commercial purposes. The above information is an  only. It is not intended as medical advice for individual conditions or treatments.  Talk to your doctor, nurse or pharmacist before following any medical regimen to see if it is safe and effective for you. Hip Bursitis   AMBULATORY CARE:   Hip bursitis  is inflammation of the bursa in your hip. The bursa is a fluid-filled sac that acts as a cushion between a bone and a tendon. A tendon is a cord of strong tissue that connects muscles to bones. Common signs and symptoms of hip bursitis:   Pain on the side of your hip or at the base of your hips when you sit down    Decreased movement or stiffness of your hip    Crunching or popping when you move your hip    Redness or swelling of the skin on your hip    Call your doctor if:   Your pain and swelling increase. Your symptoms do not improve with treatment. You have a fever. You have questions or concerns about your condition or care. Treatment  may include any of the following:  Medicines:      NSAIDs , such as ibuprofen, help decrease swelling, pain, and fever. NSAIDs can cause stomach bleeding or kidney problems in certain people. If you take blood thinner medicine, always ask your healthcare provider if NSAIDs are safe for you. Always read the medicine label and follow directions. Aspirin  helps relieve pain and swelling. Take aspirin exactly as directed by your healthcare provider. Antibiotics  help fight an infection caused by bacteria. Steroids  help relieve pain and swelling. Steroid injections are given directly into the painful area. Steroid pills may be given for a short time. Surgery  may be needed to remove your bursa or part of your elbow bone. Surgery is only done when other treatments do not work. Manage hip bursitis:   Rest your hip as much as possible to decrease pain and swelling. Slowly start to do more each day. Return to your daily activities as directed. Apply ice to help decrease swelling and pain. Use an ice pack, or put crushed ice in a plastic bag. Cover the bag with a towel before you place it on your elbow.  Apply ice for 15 to 20 minutes, 3 to 4 times each day, as directed. Do not lie on your injured hip. You may be more comfortable if you sleep on your stomach or back. Go to physical therapy, if directed. A physical therapist teaches you exercises to help improve movement and strength, and to decrease pain. If you play sports, the therapist can show you ways to run or jump that will help prevent hip bursitis. Prevent hip bursitis:   Do not overuse your hips. Shorten the time you spend running, climbing stairs, or riding a bike. Take breaks as you do these activities. Try not to do the same activities each day. For example, run every other day or every 3 days instead of daily. Stretch, warm up, and cool down. Always stretch and do warm-up and cool-down exercises before and after you exercise. This will help loosen your muscles and decrease stress on your hip. Rest between workouts. Wear proper shoes. Wear shoes that fit properly and support your feet. You may need to wear shoe inserts called orthotics. Orthotics help position your foot correctly as you walk or exercise. Maintain a healthy weight. Ask your healthcare provider what a healthy weight is for you. Ask him or her to help you create a weight loss plan if you are overweight. Keep pressure off your hips. Do not stand or sit for long periods of time. Sit on padded surfaces, such as a cushion or pad, whenever possible. Do not sit with your legs crossed. Bend your knees when you  objects from the ground. Follow up with your doctor as directed:  Write down your questions so you remember to ask them during your visits. © Copyright [facility] 2022 Information is for End User's use only and may not be sold, redistributed or otherwise used for commercial purposes. The above information is an  only. It is not intended as medical advice for individual conditions or treatments.  Talk to your doctor, nurse or pharmacist before following any medical regimen to see if it is safe and effective for you.

## 2023-07-03 NOTE — PROGRESS NOTES
Chief Complaint   Patient presents with   • Hip Pain     Left hip pain x 4 days      Name: Natalie Marroquin      : 1970      MRN: [id number]  Encounter Provider: Ga Waldrop DO  Encounter Date: 7/3/2023   Encounter department: [facility] PRIMARY [facility]ARE    Assessment & Plan     1. Trochanteric bursitis of left hip  Assessment & Plan:  Prednisone and home exercises     Orders:  -     predniSONE 10 mg tablet; 4 tablets for 3 days then 3 tablets for 3 days then 2 tablets for 3 days then 1 tablet daily for 3 days         Subjective      Patient is here for left hip pain for last 4 days Patient noted for awhile that sometimes lying on left hip was painful Patient woke Saturday morning with severe left hip pain She had pain laterally Marisol notes that sitting and lying down are harder than walking for her She is taking advil and tylenol and using heating pad with slight improvement 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 The incident occurred at home. There was no injury mechanism. The pain is present in the left hip. The quality of the pain is described as aching. The pain is at a severity of 7/10. The pain is moderate. The pain has been constant since onset. Pertinent negatives include no inability to bear weight, loss of motion, loss of sensation, muscle weakness, numbness or tingling. She reports no foreign bodies present. Exacerbated by: lying down. She has tried acetaminophen, NSAIDs, rest and heat for the symptoms. The treatment provided mild relief. Review of Systems   Musculoskeletal: Positive for arthralgias. Negative for back pain, gait problem and joint swelling. Left hip pain   Skin: Negative for rash.    Neurological: Negative for tingling, weakness and numbness.        Current Outpatient Medications on File Prior to Visit   Medication Sig   • Ascorbic Acid (VITAMIN C GUMMIE PO) Take by mouth daily   • Calcium Citrate 200 MG TABS One tablet daily   • cholecalciferol (VITAMIN D3) 1,000 units tablet Take 2,000 Units by mouth daily    • Cyanocobalamin ER 1000 MCG TBCR Take 1 tablet by mouth daily   • cyclobenzaprine (FLEXERIL) 10 mg tablet Take 1 tablet (10 mg total) by mouth daily at bedtime As needed for muscle stiffness   • Flaxseed Oil OIL Take 1 capsule by mouth daily    • Glucos-Chondroit-Collag-Hyal (GLUCOSAMINE CHONDROIT-COLLAGEN PO)    • levothyroxine 88 mcg tablet 100 mcg   • Lysine 1000 MG TABS Take 1,000 mg by mouth daily    • Magnesium 400 MG TABS Take 1 tablet by mouth daily    • Multiple Vitamins-Minerals (ZINC PO) Take by mouth   • pyridoxine (B-6) 100 MG tablet One tablet daily   • Turmeric 500 MG CAPS Take 1 tablet by mouth daily    • loratadine (CLARITIN) 10 mg tablet Take 10 mg by mouth daily (Patient not taking: Reported on 3/3/2023)   • omega-3-acid ethyl esters (LOVAZA) 1 g capsule Take 2 g by mouth 2 (two) times a day (Patient not taking: Reported on 5/18/2023)   • phenazopyridine (PYRIDIUM) 100 mg tablet Take 1 tablet (100 mg total) by mouth 3 (three) times a day as needed for bladder spasms (Patient not taking: Reported on 5/18/2023)   • sodium picosulfate, magnesium oxide, citric acid (Clenpiq) 10-3.5-12 MG-GM -GM/160ML SOLN Take 1 kit by mouth see administration instructions (Patient not taking: Reported on 5/18/2023)   • valACYclovir (VALTREX) 1,000 mg tablet Take 1 tablet (1,000 mg total) by mouth 2 (two) times a day for 1 day (Patient not taking: Reported on 5/18/2023)   • [DISCONTINUED] celecoxib (CeleBREX) 200 mg capsule TAKE 1 CAPSULE EVERY DAY BY ORAL ROUTE. (Patient not taking: Reported on 5/18/2023)       Objective     /78   Ht 5' 5" (1.651 m)   Wt 87.5 kg (193 lb)   BMI 32.12 kg/m² Physical Exam  Vitals and nursing note reviewed. Constitutional:       Appearance: She is obese. Musculoskeletal:      Comments: Full ROM of lumbar spine in all planes Normal gait Normal toe and heel walk Patient with 5/5 stregnth both legs Patient does have pain with adbuction and adduction of the left hip Patient pain is left side over the trochanteric bursa   Skin:     Findings: No rash. Neurological:      General: No focal deficit present. Mental Status: She is alert and oriented to person, place, and time. Cranial Nerves: No cranial nerve deficit. Sensory: No sensory deficit. Motor: No weakness.       Gait: Gait normal.   Psychiatric:         Mood and Affect: Mood normal.         Behavior: Behavior normal.       Gurmeet Martinez DO

## 2023-07-07 ENCOUNTER — TELEPHONE (OUTPATIENT)
Dept: FAMILY MEDICINE CLINIC | Facility: CLINIC | Age: 53
End: 2023-07-07

## 2023-07-09 PROBLEM — E78.00 PURE HYPERCHOLESTEROLEMIA: Status: ACTIVE | Noted: 2023-07-09

## 2023-07-09 PROBLEM — I77.3 FIBROMUSCULAR DYSPLASIA OF BOTH CAROTID ARTERIES (HCC): Status: ACTIVE | Noted: 2023-07-09

## 2023-07-11 ENCOUNTER — OFFICE VISIT (OUTPATIENT)
Dept: CARDIOLOGY CLINIC | Facility: CLINIC | Age: 53
End: 2023-07-11
Payer: COMMERCIAL

## 2023-07-11 VITALS
BODY MASS INDEX: 32.08 KG/M2 | HEART RATE: 80 BPM | WEIGHT: 192.8 LBS | DIASTOLIC BLOOD PRESSURE: 70 MMHG | SYSTOLIC BLOOD PRESSURE: 118 MMHG

## 2023-07-11 DIAGNOSIS — I77.3 FIBROMUSCULAR DYSPLASIA OF BOTH CAROTID ARTERIES (HCC): ICD-10-CM

## 2023-07-11 DIAGNOSIS — R07.2 PRECORDIAL CHEST PAIN: ICD-10-CM

## 2023-07-11 DIAGNOSIS — R74.01 TRANSAMINITIS: ICD-10-CM

## 2023-07-11 DIAGNOSIS — I44.7 LEFT BUNDLE BRANCH BLOCK: Primary | ICD-10-CM

## 2023-07-11 DIAGNOSIS — E78.00 PURE HYPERCHOLESTEROLEMIA: ICD-10-CM

## 2023-07-11 DIAGNOSIS — R42 VERTIGO: ICD-10-CM

## 2023-07-11 PROCEDURE — 99214 OFFICE O/P EST MOD 30 MIN: CPT | Performed by: INTERNAL MEDICINE

## 2023-07-11 NOTE — PROGRESS NOTES
[facility]  [address] [place] [address] [address] [facility] [address]  Phone#  [phone]   Fax#  [phone]  *-*-*-*-*-*-*-*-*-*-*-*-*-*-*-*-*-*-*-*-*-*-*-*-*-*-*-*-*-*-*-*-*-*-*-*-*-*-*-*-*-*-*-*-*-*-*-*-*-*-*-*-*-*                                   Cardiology Follow Up      ENCOUNTER DATE: 23 8:43 AM  PATIENT NAME: Aniket Garza   : 1970    MRN: [id number]  AGE:52 y.o. SEX: female  [address] [facility] MD Chanel     PRIMARY CARE PHYSICIAN: Alden Bray DO    ACTIVE DIAGNOSIS THIS VISIT  1. Left bundle branch block        2. Fibromuscular dysplasia of both carotid arteries [facility])  Ambulatory referral to Vascular Surgery      3. Pure hypercholesterolemia        4. Precordial chest pain        5. Vertigo        6. Transaminitis  Comprehensive metabolic panel        ACTIVE PROBLEM LIST  Patient Active Problem List   Diagnosis   • Abnormal fasting glucose   • Herpes simplex type 1 infection   • History of thyroid cancer   • Lumbar degenerative disc disease   • Papillary carcinoma of thyroid (HCC)   • Postoperative hypothyroidism   • Seasonal allergies   • Vitamin D deficiency   • Left bundle branch block   • Precordial chest pain   • Vertigo   • Trochanteric bursitis of left hip   • Fibromuscular dysplasia of both carotid arteries ([address])   • Pure hypercholesterolemia       CARDIOLOGY SPECIALTY COMMENTS  Patient referred to cardiology by  Alden Bray DO following ED visit in which patient had an abnormal EKG and referral to cardiology was recommended by the ED practitioner. EKG demonstrates sinus rhythm with left bundle branch block pattern. According to ED physician, this is unchanged since 2019 although I cannot find previous EKGs in epic or [place]. 3/3/2023 [facility] ED 78-year-old F with sharp left-sided chest pain exacerbated by moving her arm and associated with dizziness, feeling off balance, woozy with nausea.   Was given meclizine by PCP which made her symptoms better but made her sleepy. Examination in the ED demonstrated an abnormal gait which was not acute ataxia but patient preferred to walk slow possibly slightly wide stance due to subjective sensation of being off balance    3/3/2023 CTA of the head and neck no acute intracranial abnormality, no stenosis occlusion or thrombosis, mild fibromuscular dysplasia involving the distal aspect of the cervical internal carotid artery bilaterally with no stenosis, dissection or pseudoaneurysm    6/14/2023 echocardiogram: LV systolic function low normal, EF 52%. Borderline GLS at -17% mild global hypokinesis. Normal diastolic function. Normal RV size and function. Mild MR and TR with normal PASP, 22 mmHg    INTERVAL HISTORY:        Patient with a history of atypical chest pain which is probably musculoskeletal in nature and leg edema returns. Since her last visit she had her cholesterol checked which demonstrated an elevation of the LDL at 170 and a decrease of the HDL at 41. She went on a diet with restriction of her sugar and her salt. She has lost 8 pounds. She has minimal lower extremity edema and none today. She has had minimal chest discomfort. She was exercising but has developed a hip bursitis and is presently on prednisone and resting her hip. Her ALT is slightly elevated at 91 previously being 49. She has not had it rechecked. She does not drink much alcohol    She had a CTA of the head and neck which demonstrated mild fibromuscular dysplasia of the internal carotids bilaterally. An echocardiogram demonstrated low normal left ventricular systolic function with borderline GLS. Left ventricular diastolic function was normal and left ventricular size was normal.  Right ventricular size and function was normal.  Pulmonary artery pressures were normal.    DISCUSSION/PLAN:          · Recommend patient have a repeat echocardiogram with GLS in 1 year.   · Recommend the patient have her CMP repeated to see if her ALT is improved the same or worse. · If ALT remains elevated would recommend referral to a GI specialist to evaluate the etiology and give us an opinion on use of statins for her cholesterol  · Referral to vascular surgery for evaluation of mild bilateral fibromuscular dysplasia of the internal carotids arteries. Would like their recommendations concerning future follow-up.   · Return in 6 months  · Patient to call office if she has an increase in chest pain or lower extremity edema  · Will call patient following her recent ALT test  · I hope patient can eventually take a statin with her presently elevated cholesterol      Lab Studies:    Lab Results   Component Value Date    CHOLESTEROL 238 (H) 05/20/2023    CHOLESTEROL 215 (H) 10/02/2021    CHOLESTEROL 194 03/07/2020     Lab Results   Component Value Date    TRIG 135 05/20/2023    TRIG 88 10/02/2021    TRIG 90 03/07/2020     Lab Results   Component Value Date    HDL 41 (L) 05/20/2023    HDL 42 10/02/2021    HDL 41 03/07/2020     Lab Results   Component Value Date    LDLCALC 170 (H) 05/20/2023    LDLCALC 155 (H) 10/02/2021    LDLCALC 135 (H) 03/07/2020       Lab Results   Component Value Date    NTBNP 64 03/07/2020     Lab Results   Component Value Date    HGBA1C 5.4 10/01/2022      Lab Results   Component Value Date    EGFR 90 05/20/2023    EGFR 83 03/03/2023    EGFR 89 10/01/2022    SODIUM 140 05/20/2023    SODIUM 140 03/03/2023    SODIUM 139 10/01/2022    K 4.0 05/20/2023    K 4.2 03/03/2023    K 4.3 10/01/2022     (H) 05/20/2023     03/03/2023     10/01/2022    CO2 28 05/20/2023    CO2 29 03/03/2023    CO2 31 10/01/2022    ANIONGAP 8 05/03/2015    BUN 12 05/20/2023    BUN 17 03/03/2023    BUN 13 10/01/2022    CREATININE 0.75 05/20/2023    CREATININE 0.80 03/03/2023    CREATININE 0.76 10/01/2022     Lab Results   Component Value Date    WBC 9.57 03/03/2023    WBC 6.30 10/02/2021    WBC 6.38 12/30/2019    HGB 15.6 (H) 03/03/2023    HGB 15.0 10/02/2021 HGB 15.2 12/30/2019    HCT 47.4 (H) 03/03/2023    HCT 46.6 (H) 10/02/2021    HCT 47.7 (H) 12/30/2019    MCV 93 03/03/2023    MCV 95 10/02/2021    MCV 95 12/30/2019    MCH 30.6 03/03/2023    MCH 30.5 10/02/2021    MCH 30.1 12/30/2019    MCHC 32.9 03/03/2023    MCHC 32.2 10/02/2021    MCHC 31.9 12/30/2019     03/03/2023     10/02/2021     12/30/2019      Lab Results   Component Value Date    GLUCOSE 92 05/03/2015    CALCIUM 9.1 05/20/2023    CALCIUM 10.1 03/03/2023    CALCIUM 9.5 10/01/2022    AST 40 05/20/2023    AST 20 10/02/2021    AST 19 03/07/2020    ALT 91 (H) 05/20/2023    ALT 49 10/02/2021    ALT 43 03/07/2020    ALKPHOS 84 05/20/2023    ALKPHOS 78 10/02/2021    ALKPHOS 79 03/07/2020    PROT 6.9 05/03/2015    BILITOT 0.6 05/03/2015     Lab Results   Component Value Date    FREET4 0.94 01/14/2023    FREET4 1.08 10/01/2022       Lab Results   Component Value Date    FERRITIN 297 12/30/2019     No results found for this visit on 07/11/23.       Current Outpatient Medications:   •  Ascorbic Acid (VITAMIN C GUMMIE PO), Take by mouth daily, Disp: , Rfl:   •  Calcium Citrate 200 MG TABS, One tablet daily, Disp: , Rfl:   •  cholecalciferol (VITAMIN D3) 1,000 units tablet, Take 2,000 Units by mouth daily , Disp: , Rfl:   •  Cyanocobalamin ER 1000 MCG TBCR, Take 1 tablet by mouth daily, Disp: , Rfl:   •  Flaxseed Oil OIL, Take 1 capsule by mouth daily , Disp: , Rfl:   •  Glucos-Chondroit-Collag-Hyal (GLUCOSAMINE CHONDROIT-COLLAGEN PO), , Disp: , Rfl:   •  levothyroxine 88 mcg tablet, 100 mcg, Disp: , Rfl:   •  Lysine 1000 MG TABS, Take 1,000 mg by mouth daily , Disp: , Rfl:   •  Magnesium 400 MG TABS, Take 1 tablet by mouth daily , Disp: , Rfl:   •  Multiple Vitamins-Minerals (ZINC PO), Take by mouth, Disp: , Rfl:   •  omega-3-acid ethyl esters (LOVAZA) 1 g capsule, Take 2 g by mouth 2 (two) times a day, Disp: , Rfl:   •  predniSONE 10 mg tablet, 4 tablets for 3 days then 3 tablets for 3 days then 2 tablets for 3 days then 1 tablet daily for 3 days, Disp: 30 tablet, Rfl: 0  •  sodium picosulfate, magnesium oxide, citric acid (Clenpiq) 10-3.5-12 MG-GM -GM/160ML SOLN, Take 1 kit by mouth see administration instructions, Disp: 160 mL, Rfl: 0  •  Turmeric 500 MG CAPS, Take 1 tablet by mouth daily , Disp: , Rfl:   •  cyclobenzaprine (FLEXERIL) 10 mg tablet, Take 1 tablet (10 mg total) by mouth daily at bedtime As needed for muscle stiffness, Disp: 20 tablet, Rfl: 0  •  loratadine (CLARITIN) 10 mg tablet, Take 10 mg by mouth daily (Patient not taking: Reported on 3/3/2023), Disp: , Rfl:   •  phenazopyridine (PYRIDIUM) 100 mg tablet, Take 1 tablet (100 mg total) by mouth 3 (three) times a day as needed for bladder spasms (Patient not taking: Reported on 5/18/2023), Disp: 10 tablet, Rfl: 0  •  pyridoxine (B-6) 100 MG tablet, One tablet daily, Disp: , Rfl:   •  valACYclovir (VALTREX) 1,000 mg tablet, Take 1 tablet (1,000 mg total) by mouth 2 (two) times a day for 1 day (Patient not taking: Reported on 5/18/2023), Disp: 4 tablet, Rfl: 1  Allergies   Allergen Reactions   • Bee Venom Anaphylaxis   • Dust Mite Extract    • Gabapentin Dizziness     Annotation - [id number]: "felt like out of body experience"   • Molds & Smuts Sneezing   • Other      Annotation - [id number]: seasonal, Trees   • Pollen Extract    • Bernard Grass Pollen Allergen    • Latex Rash       Past Medical History:   Diagnosis Date   • Cervical neuritis 05/16/2013    C7- T1 Left, Last assessed   • Endometriosis, cervix    • GERD (gastroesophageal reflux disease)    • Herpes    • Hiatal hernia     R INGUINAL   • Hypothyroidism    • Thyroid cancer ([address])     THYROID     Social History     Socioeconomic History   • Marital status: /Civil Union     Spouse name: Not on file   • Number of children: Not on file   • Years of education: Not on file   • Highest education level: Not on file   Occupational History   • Occupation:    Tobacco Use • Smoking status: Never   • Smokeless tobacco: Never   Vaping Use   • Vaping Use: Never used   Substance and Sexual Activity   • Alcohol use: Not Currently     Comment: socially   • Drug use: No   • Sexual activity: Not Currently   Other Topics Concern   • Not on file   Social History Narrative   • Not on file     Social Determinants of Health     Financial Resource Strain: Not on file   Food Insecurity: Not on file   Transportation Needs: Not on file   Physical Activity: Not on file   Stress: Not on file   Social Connections: Not on file   Intimate Partner Violence: Not on file   Housing Stability: Not on file      Family History   Problem Relation Age of Onset   • Thyroid disease Mother    • Cancer Father    • Colon polyps Father    • Thyroid disease Sister    • Thyroid disease Brother    • Diabetes Maternal Grandmother      Past Surgical History:   Procedure Laterality Date   • CHOLECYSTECTOMY  05/2020   • COLONOSCOPY  12/2015 2015  Shannan Lentz. Normal exam, hemorrhoids. • COLONOSCOPY  2010    Dr. Shannan Lentz. Normal exam, hemorrhoids. • Loyd Dixon. Normal exam   • HERNIA REPAIR     • HYSTERECTOMY  2005    has ovaries   • [facility] LAPAROSCOPY SURG CHOLECYSTECTOMY N/A 05/23/2019    Procedure: LAP. ZANE., IOCG;  Surgeon: Astrid Godwin MD;  Location: [facility];  Service: General   • THYROIDECTOMY      early 2008 [place]   • TUBAL LIGATION         PREVIOUS WEIGHTS:   Wt Readings from Last 10 Encounters:   07/11/23 87.5 kg (192 lb 12.8 oz)   07/03/23 87.5 kg (193 lb)   06/14/23 90.7 kg (200 lb)   05/18/23 90.7 kg (200 lb)   03/28/23 90.5 kg (199 lb 9.6 oz)   03/03/23 87.9 kg (193 lb 12.6 oz)   03/03/23 87.5 kg (193 lb)   03/01/23 90 kg (198 lb 6.4 oz)   02/22/23 89.3 kg (196 lb 12.8 oz)   12/01/22 88 kg (194 lb)        Review of Systems:  Review of Systems   Respiratory: Negative for cough, choking, chest tightness, shortness of breath and wheezing.     Cardiovascular: Negative for chest pain, palpitations and leg swelling. Musculoskeletal: Negative for gait problem. Skin: Negative for rash. Neurological: Negative for dizziness, tremors, syncope, weakness, light-headedness, numbness and headaches. Psychiatric/Behavioral: Negative for agitation and behavioral problems. The patient is not hyperactive. Physical Exam:  /70 (BP Location: Left arm, Patient Position: Sitting, Cuff Size: Adult)   Pulse 80   Wt 87.5 kg (192 lb 12.8 oz)   BMI 32.08 kg/m²     Physical Exam  Constitutional:       General: She is not in acute distress. Appearance: She is well-developed. HENT:      Head: Normocephalic and atraumatic. Neck:      Thyroid: No thyromegaly. Vascular: No carotid bruit or JVD. Trachea: No tracheal deviation. Cardiovascular:      Rate and Rhythm: Normal rate and regular rhythm. Pulses: Normal pulses. Heart sounds: Normal heart sounds. No murmur heard. No friction rub. No gallop. Pulmonary:      Effort: Pulmonary effort is normal. No respiratory distress. Breath sounds: Normal breath sounds. No wheezing, rhonchi or rales. Chest:      Chest wall: No tenderness. Musculoskeletal:         General: Normal range of motion. Cervical back: Normal range of motion and neck supple. Right lower leg: No edema. Left lower leg: No edema. Skin:     General: Skin is warm and dry. Neurological:      General: No focal deficit present. Mental Status: She is alert and oriented to person, place, and time. Psychiatric:         Mood and Affect: Mood normal.         Behavior: Behavior normal.         Thought Content: Thought content normal.         Judgment: Judgment normal.         ======================================================  Imaging:   I have personally reviewed pertinent reports. I spent 30 minutes on the patient's office visit. This time was spent on the day of the visit.  I had direct contact with the patient in the office on the day of the visit. Greater than 50% of the total time was spent obtaining a history, examining patient, answering all patient questions, arranging and discussing plan of care with patient as well as directly providing instructions. Additional time then spent on orders and office chart. Portions of the record may have been created with voice recognition software. Occasional wrong word or "sound a like" substitutions may have occurred due to the inherent limitations of voice recognition software. Read the chart carefully and recognize, using context, where substitutions have occurred.     SIGNATURES:   Michelle Gibbs MD

## 2023-07-31 ENCOUNTER — OFFICE VISIT (OUTPATIENT)
Dept: FAMILY MEDICINE CLINIC | Facility: CLINIC | Age: 53
End: 2023-07-31
Payer: COMMERCIAL

## 2023-07-31 VITALS
WEIGHT: 189.8 LBS | BODY MASS INDEX: 30.5 KG/M2 | DIASTOLIC BLOOD PRESSURE: 62 MMHG | OXYGEN SATURATION: 95 % | TEMPERATURE: 97.8 F | HEART RATE: 95 BPM | SYSTOLIC BLOOD PRESSURE: 124 MMHG | HEIGHT: 66 IN

## 2023-07-31 DIAGNOSIS — M17.12 PRIMARY OSTEOARTHRITIS OF LEFT KNEE: Primary | ICD-10-CM

## 2023-07-31 DIAGNOSIS — M23.92 LOCKING OF LEFT KNEE: ICD-10-CM

## 2023-07-31 DIAGNOSIS — M25.362 INSTABILITY OF LEFT KNEE JOINT: ICD-10-CM

## 2023-07-31 PROCEDURE — 99213 OFFICE O/P EST LOW 20 MIN: CPT | Performed by: FAMILY MEDICINE

## 2023-07-31 RX ORDER — NAPROXEN 500 MG/1
500 TABLET ORAL 2 TIMES DAILY WITH MEALS
Qty: 60 TABLET | Refills: 1 | Status: SHIPPED | OUTPATIENT
Start: 2023-07-31

## 2023-07-31 NOTE — PROGRESS NOTES
Chief Complaint   Patient presents with   • Knee Pain     Pt states she has arthritis in her left knee but she started getting sharp pain since Friday. Name: Sandra Elizondo      : 1970      MRN: [id number]  Encounter Provider: Zita Pinon DO  Encounter Date: 2023   Encounter department: [facility] PRIMARY CARE    Assessment & Plan     1. Primary osteoarthritis of left knee  Assessment & Plan:  Reviewed the xray from     Orders:  -     naproxen (Naprosyn) 500 mg tablet; Take 1 tablet (500 mg total) by mouth 2 (two) times a day with meals  -     MRI knee left  wo contrast; Future; Expected date: 2023  -     Ambulatory Referral to Orthopedic Surgery; Future    2. Locking of left knee  Assessment & Plan:  Suspect meniscus tear will check MRI and refer to ortho Patient to take naprosyn as directed     Orders:  -     naproxen (Naprosyn) 500 mg tablet; Take 1 tablet (500 mg total) by mouth 2 (two) times a day with meals  -     MRI knee left  wo contrast; Future; Expected date: 2023  -     Ambulatory Referral to Orthopedic Surgery; Future    3. Instability of left knee joint  Assessment & Plan:  Check MRI and refer to ortho    Orders:  -     naproxen (Naprosyn) 500 mg tablet; Take 1 tablet (500 mg total) by mouth 2 (two) times a day with meals  -     MRI knee left  wo contrast; Future; Expected date: 2023  -     Ambulatory Referral to Orthopedic Surgery;  Future         Subjective      Patient has history of DJD of the left knee diagnosed in  Patient was walking last week with her neighbor and started with sharp pain in the left lateral knee Patient stopped what she was doing and rested She was able to get back home but it was difficult Patient since then has had locking of the knee and the left knee won't hold her weight She is not able to full extend or flex the knee Patient is taking advil and tylenol with no relief Patient does not have any swelling of the knee Patient is not able to use left leg to step up on the stairs and she feels it won't hold her weight     Knee Pain   The incident occurred 3 to 5 days ago. The incident occurred at home. There was no injury mechanism. The pain is present in the left knee. The quality of the pain is described as aching. The pain is at a severity of 7/10. The pain is moderate. The pain has been constant since onset. Associated symptoms include an inability to bear weight and a loss of motion. Pertinent negatives include no loss of sensation, muscle weakness, numbness or tingling. The symptoms are aggravated by movement and weight bearing. She has tried ice, acetaminophen, NSAIDs and rest for the symptoms. The treatment provided mild relief. Review of Systems   Neurological: Negative for tingling and numbness.        Current Outpatient Medications on File Prior to Visit   Medication Sig   • Ascorbic Acid (VITAMIN C GUMMIE PO) Take by mouth daily   • Calcium Citrate 200 MG TABS One tablet daily   • cholecalciferol (VITAMIN D3) 1,000 units tablet Take 2,000 Units by mouth daily    • Cyanocobalamin ER 1000 MCG TBCR Take 1 tablet by mouth daily   • Flaxseed Oil OIL Take 1 capsule by mouth daily    • Glucos-Chondroit-Collag-Hyal (GLUCOSAMINE CHONDROIT-COLLAGEN PO)    • levothyroxine 88 mcg tablet 100 mcg   • Lysine 1000 MG TABS Take 1,000 mg by mouth daily    • Magnesium 400 MG TABS Take 1 tablet by mouth daily    • Multiple Vitamins-Minerals (ZINC PO) Take by mouth   • omega-3-acid ethyl esters (LOVAZA) 1 g capsule Take 2 g by mouth 2 (two) times a day   • sodium picosulfate, magnesium oxide, citric acid (Clenpiq) 10-3.5-12 MG-GM -GM/160ML SOLN Take 1 kit by mouth see administration instructions   • Turmeric 500 MG CAPS Take 1 tablet by mouth daily    • predniSONE 10 mg tablet 4 tablets for 3 days then 3 tablets for 3 days then 2 tablets for 3 days then 1 tablet daily for 3 days (Patient not taking: Reported on 7/31/2023)   • pyridoxine (B-6) 100 MG tablet One tablet daily (Patient not taking: Reported on 7/31/2023)   • valACYclovir (VALTREX) 1,000 mg tablet Take 1 tablet (1,000 mg total) by mouth 2 (two) times a day for 1 day (Patient not taking: Reported on 5/18/2023)   • [DISCONTINUED] cyclobenzaprine (FLEXERIL) 10 mg tablet Take 1 tablet (10 mg total) by mouth daily at bedtime As needed for muscle stiffness (Patient not taking: Reported on 7/31/2023)   • [DISCONTINUED] loratadine (CLARITIN) 10 mg tablet Take 10 mg by mouth daily (Patient not taking: Reported on 3/3/2023)   • [DISCONTINUED] phenazopyridine (PYRIDIUM) 100 mg tablet Take 1 tablet (100 mg total) by mouth 3 (three) times a day as needed for bladder spasms (Patient not taking: Reported on 5/18/2023)       Objective     /62 (BP Location: Left arm, Patient Position: Sitting, Cuff Size: Large)   Pulse 95   Temp 97.8 °F (36.6 °C) (Temporal)   Ht 5' 6" (1.676 m)   Wt 86.1 kg (189 lb 12.8 oz)   SpO2 95%   BMI 30.63 kg/m²     Physical Exam  Vitals and nursing note reviewed. Constitutional:       Appearance: Normal appearance. HENT:      Head: Normocephalic. Eyes:      Extraocular Movements: Extraocular movements intact. Pupils: Pupils are equal, round, and reactive to light. Musculoskeletal:      Comments: Patient with severe pain with valgus and varus strain Patient has pain lateral joint line to palpation Patient is able to flex to 90 degrees Patient cannot full extend the knee Patient is limping    Skin:     General: Skin is warm. Findings: No rash. Neurological:      General: No focal deficit present. Mental Status: She is alert and oriented to person, place, and time.    Psychiatric:         Mood and Affect: Mood normal.         Behavior: Behavior normal.       Zeb Penn DO

## 2023-08-01 ENCOUNTER — TELEPHONE (OUTPATIENT)
Dept: FAMILY MEDICINE CLINIC | Facility: CLINIC | Age: 53
End: 2023-08-01

## 2023-08-01 NOTE — TELEPHONE ENCOUNTER
Pt called and is wondering if she can weight bear on her left knee? Pt has an outing tonight at [employer] as wondering if she can go or not. Pt does not want to injure her knee anymore. Pt has an appt w/ Ortho this week. Please advise.  Thanks

## 2023-08-02 RX ORDER — NAPROXEN 500 MG/1
TABLET, DELAYED RELEASE ORAL
Qty: 60 TABLET | Refills: 4 | OUTPATIENT
Start: 2023-08-02

## 2023-08-03 ENCOUNTER — CONSULT (OUTPATIENT)
Dept: OBGYN CLINIC | Facility: CLINIC | Age: 53
End: 2023-08-03
Payer: COMMERCIAL

## 2023-08-03 VITALS
BODY MASS INDEX: 30.98 KG/M2 | SYSTOLIC BLOOD PRESSURE: 107 MMHG | HEIGHT: 66 IN | HEART RATE: 60 BPM | WEIGHT: 192.8 LBS | DIASTOLIC BLOOD PRESSURE: 71 MMHG

## 2023-08-03 DIAGNOSIS — M23.92 INTERNAL DERANGEMENT OF LEFT KNEE: Primary | ICD-10-CM

## 2023-08-03 DIAGNOSIS — M25.562 LEFT KNEE PAIN, UNSPECIFIED CHRONICITY: ICD-10-CM

## 2023-08-03 DIAGNOSIS — M17.12 PRIMARY OSTEOARTHRITIS OF LEFT KNEE: ICD-10-CM

## 2023-08-03 DIAGNOSIS — Z01.89 ENCOUNTER FOR LOWER EXTREMITY COMPARISON IMAGING STUDY: ICD-10-CM

## 2023-08-03 DIAGNOSIS — M25.362 INSTABILITY OF LEFT KNEE JOINT: ICD-10-CM

## 2023-08-03 PROCEDURE — 99204 OFFICE O/P NEW MOD 45 MIN: CPT | Performed by: ORTHOPAEDIC SURGERY

## 2023-08-03 NOTE — PROGRESS NOTES
Assessment/Plan     1. Internal derangement of left knee    2. Primary osteoarthritis of left knee    3. Instability of left knee joint    4. Left knee pain, unspecified chronicity    5. Encounter for lower extremity comparison imaging study      Orders Placed This Encounter   Procedures   • XR knee 4+ vw left injury   • XR knee 1 or 2 vw right   • Ambulatory referral to Physical Therapy     · Reviewed today's physical exam findings and x-ray findings with patient at time of visit  · Today's findings are most consistent with acute exacerbation of mild to moderate osteoarthritis of the left knee versus acute meniscal pathology  · Discussed conservative treatment via physical therapy, oral NSAIDs, activity modification, bracing, and CS injection  · She will continue with left knee MRI as previously referred  · Patient declined CS injection today  · Referral provided for formal physical therapy to address range of motion, pain reduction, swelling reduction, and strengthening  · Continue naproxen as previously prescribed  · Patient will follow-up after MRI is complete      Return in 2 weeks (on 8/17/2023), or after MRI is complete, for Re-evaluation, MRI Review. I answered all of the patient's questions during the visit and provided education of the patient's condition during the visit. The patient verbalized understanding of the information given and agrees with the plan. This note was dictated using Tripvisto software. It may contain errors including improperly dictated words. Please contact physician directly for any questions. History of Present Illness   Chief complaint:   Chief Complaint   Patient presents with   • Left Knee - Pain       HPI: Marysol Webb is a 45 y.o. female that c/o left knee pain. Patient reports that she has had waxing and waning symptoms for 2+ years, but in the last week she experienced an acute exacerbation of her left knee pain without injury.  She states that over the past few months, she has been working on increasing her exercise activity, and has been walking in increasing amounts starting with 1 mile and has recently reached 2-1/2 miles. She describes her pain as being mostly lateral and posterolateral and exacerbated with weightbearing activity. On Friday, 7/28/2023 she had an acute exacerbation of her pain that was so significant she discontinued weightbearing activity for the following few days. She managed conservatively with OTC NSAIDs, elevation, and ice. On Monday, 7/31/2023, she contacted her doctor because her symptoms were not significantly improving. Today, she states that her pain has reduced with oral naproxen as prescribed by her PCP. Today she reports pain exacerbation with knee flexion beyond 90 degrees. She denies any associated bruising, swelling, numbness, or tingling. ROS:    See HPI for musculoskeletal review. All other systems reviewed are negative     Historical Information   Past Medical History:   Diagnosis Date   • Cervical neuritis 05/16/2013    C7- T1 Left, Last assessed   • Endometriosis, cervix    • GERD (gastroesophageal reflux disease)    • Herpes    • Hiatal hernia     R INGUINAL   • Hypothyroidism    • Thyroid cancer [facility])     THYROID     Past Surgical History:   Procedure Laterality Date   • CHOLECYSTECTOMY  05/2020   • COLONOSCOPY  12/2015 2015  Tiffany Bashir. Normal exam, hemorrhoids. • COLONOSCOPY  2010    Dr. Tiffayn Bashir. Normal exam, hemorrhoids. • [place]    Dr. Vonnie Valerio. Normal exam   • HERNIA REPAIR     • HYSTERECTOMY  2005    has ovaries   • ND LAPAROSCOPY SURG CHOLECYSTECTOMY N/A 05/23/2019    Procedure: LAP.  ZANE., IOCG;  Surgeon: Radha Kelly MD;  Location: [facility];  Service: General   • THYROIDECTOMY      early 2008 [place]   • TUBAL LIGATION       Social History   Social History     Substance and Sexual Activity   Alcohol Use Not Currently    Comment: socially     Social History     Substance and Sexual Activity Drug Use No     Social History     Tobacco Use   Smoking Status Never   Smokeless Tobacco Never     Family History:   Family History   Problem Relation Age of Onset   • Thyroid disease Mother    • Cancer Father    • Colon polyps Father    • Thyroid disease Sister    • Thyroid disease Brother    • Diabetes Maternal Grandmother        Current Outpatient Medications on File Prior to Visit   Medication Sig Dispense Refill   • Ascorbic Acid (VITAMIN C GUMMIE PO) Take by mouth daily     • Calcium Citrate 200 MG TABS One tablet daily     • cholecalciferol (VITAMIN D3) 1,000 units tablet Take 2,000 Units by mouth daily      • Cyanocobalamin ER 1000 MCG TBCR Take 1 tablet by mouth daily     • Flaxseed Oil OIL Take 1 capsule by mouth daily      • Glucos-Chondroit-Collag-Hyal (GLUCOSAMINE CHONDROIT-COLLAGEN PO)      • levothyroxine 88 mcg tablet 100 mcg     • Lysine 1000 MG TABS Take 1,000 mg by mouth daily      • Magnesium 400 MG TABS Take 1 tablet by mouth daily      • Multiple Vitamins-Minerals (ZINC PO) Take by mouth     • naproxen (Naprosyn) 500 mg tablet Take 1 tablet (500 mg total) by mouth 2 (two) times a day with meals 60 tablet 1   • omega-3-acid ethyl esters (LOVAZA) 1 g capsule Take 2 g by mouth 2 (two) times a day     • sodium picosulfate, magnesium oxide, citric acid (Clenpiq) 10-3.5-12 MG-GM -GM/160ML SOLN Take 1 kit by mouth see administration instructions 160 mL 0   • Turmeric 500 MG CAPS Take 1 tablet by mouth daily      • predniSONE 10 mg tablet 4 tablets for 3 days then 3 tablets for 3 days then 2 tablets for 3 days then 1 tablet daily for 3 days (Patient not taking: Reported on 7/31/2023) 30 tablet 0   • pyridoxine (B-6) 100 MG tablet One tablet daily (Patient not taking: Reported on 7/31/2023)     • valACYclovir (VALTREX) 1,000 mg tablet Take 1 tablet (1,000 mg total) by mouth 2 (two) times a day for 1 day (Patient not taking: Reported on 5/18/2023) 4 tablet 1     No current facility-administered medications on file prior to visit.      Allergies   Allergen Reactions   • Bee Venom Anaphylaxis   • Dust Mite Extract    • Gabapentin Dizziness     Annotation - [id number]: "felt like out of body experience"   • Molds & Smuts Sneezing   • Other      Annotation - [id number]: seasonal, Trees   • Pollen Extract    • Bernard Grass Pollen Allergen    • Latex Rash       Current Outpatient Medications on File Prior to Visit   Medication Sig Dispense Refill   • Ascorbic Acid (VITAMIN C GUMMIE PO) Take by mouth daily     • Calcium Citrate 200 MG TABS One tablet daily     • cholecalciferol (VITAMIN D3) 1,000 units tablet Take 2,000 Units by mouth daily      • Cyanocobalamin ER 1000 MCG TBCR Take 1 tablet by mouth daily     • Flaxseed Oil OIL Take 1 capsule by mouth daily      • Glucos-Chondroit-Collag-Hyal (GLUCOSAMINE CHONDROIT-COLLAGEN PO)      • levothyroxine 88 mcg tablet 100 mcg     • Lysine 1000 MG TABS Take 1,000 mg by mouth daily      • Magnesium 400 MG TABS Take 1 tablet by mouth daily      • Multiple Vitamins-Minerals (ZINC PO) Take by mouth     • naproxen (Naprosyn) 500 mg tablet Take 1 tablet (500 mg total) by mouth 2 (two) times a day with meals 60 tablet 1   • omega-3-acid ethyl esters (LOVAZA) 1 g capsule Take 2 g by mouth 2 (two) times a day     • sodium picosulfate, magnesium oxide, citric acid (Clenpiq) 10-3.5-12 MG-GM -GM/160ML SOLN Take 1 kit by mouth see administration instructions 160 mL 0   • Turmeric 500 MG CAPS Take 1 tablet by mouth daily      • predniSONE 10 mg tablet 4 tablets for 3 days then 3 tablets for 3 days then 2 tablets for 3 days then 1 tablet daily for 3 days (Patient not taking: Reported on 7/31/2023) 30 tablet 0   • pyridoxine (B-6) 100 MG tablet One tablet daily (Patient not taking: Reported on 7/31/2023)     • valACYclovir (VALTREX) 1,000 mg tablet Take 1 tablet (1,000 mg total) by mouth 2 (two) times a day for 1 day (Patient not taking: Reported on 5/18/2023) 4 tablet 1     No current facility-administered medications on file prior to visit. Objective   Vitals: Blood pressure 107/71, pulse 60, height 5' 6" (1.676 m), weight 87.5 kg (192 lb 12.8 oz). ,Body mass index is 31.12 kg/m². PE:  AAOx 3  WDWN  Hearing intact, no drainage from eyes  Regular rate  no audible wheezing  no abdominal distension  LE compartments soft, skin intact    leftknee:    Appearance:  no swelling   No ecchymosis  no obvious joint deformity   No effusion  Palpation/Tenderness:  +TTP over medial joint line  + TTP over lateral joint line   No TTP over patella  No TTP over patellar tendon  + TTP over pes anserine bursa  + TTP over distal lateral hamstring tendon  Active Range of Motion:  AROM: 3-110  PROM: 0-120  Special Tests:  Medial Flakita's Test: Postivie  Lateral Flakita's Test:  Negative  Apley's compression test:  Negative  Lachman's Test:  negative  Anterior Drawer Test:  Negative  Patellar grind:  Negative  Valgus Stress Test:  negative  Varus Stress Test:  negative     No ipsilateral hip pain with ROM    left LE:    Sensation grossly intact L2-S1  Palpable TP pulse  AT/GS/EHL intact    Imaging Studies:  Attending Physician has personally reviewed pertinent imaging in PACS, impression is as follows:    Review of radiographic series taken 8/3/2023 of the left knee shows moderate osteoarthritis with osteophytes of the medial and anterior commpartments.       Scribe Attestation    I,:  Micheal Best am acting as a scribe while in the presence of the attending physician.:       I,:  Andy Sever, DO personally performed the services described in this documentation    as scribed in my presence.:

## 2023-08-14 ENCOUNTER — HOSPITAL ENCOUNTER (OUTPATIENT)
Facility: MEDICAL CENTER | Age: 53
Discharge: HOME | End: 2023-08-14
Payer: COMMERCIAL

## 2023-08-14 DIAGNOSIS — M17.12 PRIMARY OSTEOARTHRITIS OF LEFT KNEE: ICD-10-CM

## 2023-08-14 DIAGNOSIS — M23.92 LOCKING OF LEFT KNEE: ICD-10-CM

## 2023-08-14 DIAGNOSIS — M25.362 INSTABILITY OF LEFT KNEE JOINT: ICD-10-CM

## 2023-08-14 PROCEDURE — 73721 MRI JNT OF LWR EXTRE W/O DYE: CPT

## 2023-08-14 PROCEDURE — G1004 CDSM NDSC: HCPCS

## 2023-08-17 ENCOUNTER — CONSULT (OUTPATIENT)
Dept: VASCULAR SURGERY | Facility: CLINIC | Age: 53
End: 2023-08-17
Payer: COMMERCIAL

## 2023-08-17 VITALS
HEIGHT: 66 IN | HEART RATE: 71 BPM | WEIGHT: 188 LBS | OXYGEN SATURATION: 97 % | BODY MASS INDEX: 30.22 KG/M2 | DIASTOLIC BLOOD PRESSURE: 84 MMHG | SYSTOLIC BLOOD PRESSURE: 122 MMHG

## 2023-08-17 DIAGNOSIS — I77.3 FIBROMUSCULAR DYSPLASIA OF BOTH CAROTID ARTERIES (HCC): ICD-10-CM

## 2023-08-17 PROCEDURE — 99203 OFFICE O/P NEW LOW 30 MIN: CPT | Performed by: SURGERY

## 2023-08-17 RX ORDER — ASPIRIN 81 MG/1
81 TABLET, CHEWABLE ORAL DAILY
Start: 2023-08-17

## 2023-08-17 NOTE — ASSESSMENT & PLAN NOTE
38-year-old female presents to the office with incidental findings of FMD of the bilateral internal carotid arteries on CTA. Patient had a episode of vertigo which led to a emergency room visit back in March 2023. At that time they did a CTA of the head and neck and it was noted that there was subtle luminal caliber changes in the mid to distal portion of the extracranial internal carotid artery at the C1-C2 level suggestive of fibromuscular dysplasia. Patient has no history of TIA or strokes. She is not on any current antiplatelet therapy. I reviewed the CT scan and the findings are subtle and mild. We discussed the pathophysiology of FMD and the signs and symptoms of stroke. Recommend the patient's start aspirin 81 mg daily and we will monitor this with annual carotid duplex with a plan for a duplex in March 2024 (1 year after the initial CTA). I will have the patient follow-up at that time.

## 2023-08-17 NOTE — PROGRESS NOTES
Assessment/Plan:    Fibromuscular dysplasia of both carotid arteries [facility])  66-year-old female presents to the office with incidental findings of FMD of the bilateral internal carotid arteries on CTA. Patient had a episode of vertigo which led to a emergency room visit back in March 2023. At that time they did a CTA of the head and neck and it was noted that there was subtle luminal caliber changes in the mid to distal portion of the extracranial internal carotid artery at the C1-C2 level suggestive of fibromuscular dysplasia. Patient has no history of TIA or strokes. She is not on any current antiplatelet therapy. I reviewed the CT scan and the findings are subtle and mild. We discussed the pathophysiology of FMD and the signs and symptoms of stroke. Recommend the patient's start aspirin 81 mg daily and we will monitor this with annual carotid duplex with a plan for a duplex in March 2024 (1 year after the initial CTA). I will have the patient follow-up at that time. Diagnoses and all orders for this visit:    Fibromuscular dysplasia of both carotid arteries ([address])  -     Ambulatory referral to Vascular Surgery  -     aspirin 81 mg chewable tablet; Chew 1 tablet (81 mg total) daily  -     VAS carotid complete study; Future          Subjective:      Patient ID: Maryse Mir is a 45 y.o. female. Patient presents for consult of carotid arteries. She had a CTA head/neck done 3/3/2023. She denies TIA/CVA symptoms. She is a non smoker.       HPI    The following portions of the patient's history were reviewed and updated as appropriate: allergies, current medications, past family history, past medical history, past social history, past surgical history and problem list.    I have spent a total time of 35 minutes on 08/17/23 in caring for this patient including Diagnostic results, Prognosis, Risks and benefits of tx options, Instructions for management, Patient and family education, Importance of tx compliance, Risk factor reductions, Impressions, Counseling / Coordination of care, Documenting in the medical record, Reviewing / ordering tests, medicine, procedures   and Obtaining or reviewing history  . Review of Systems   Eyes: Negative for visual disturbance. Neurological: Negative for dizziness, facial asymmetry, speech difficulty, weakness, light-headedness and headaches. Objective:      /84 (BP Location: Left arm, Patient Position: Sitting, Cuff Size: Standard)   Pulse 71   Ht 5' 6" (1.676 m)   Wt 85.3 kg (188 lb)   SpO2 97%   BMI 30.34 kg/m²          Physical Exam  Constitutional:       Appearance: Normal appearance. HENT:      Head: Normocephalic. Mouth/Throat:      Mouth: Mucous membranes are moist.      Pharynx: Oropharynx is clear. Eyes:      Extraocular Movements: Extraocular movements intact. Pupils: Pupils are equal, round, and reactive to light. Cardiovascular:      Rate and Rhythm: Normal rate and regular rhythm. Pulses:           Carotid pulses are 2+ on the right side and 2+ on the left side. Radial pulses are 2+ on the right side and 2+ on the left side. Pulmonary:      Effort: Pulmonary effort is normal.   Abdominal:      General: Abdomen is flat. Tenderness: There is no abdominal tenderness. Musculoskeletal:      Cervical back: Normal range of motion. Right lower leg: No edema. Left lower leg: No edema. Skin:     General: Skin is warm and dry. Neurological:      General: No focal deficit present. Mental Status: She is alert and oriented to person, place, and time.    Psychiatric:         Mood and Affect: Mood normal.         Behavior: Behavior normal.

## 2023-08-18 ENCOUNTER — OFFICE VISIT (OUTPATIENT)
Dept: OBGYN CLINIC | Facility: MEDICAL CENTER | Age: 53
End: 2023-08-18
Payer: COMMERCIAL

## 2023-08-18 VITALS
BODY MASS INDEX: 30.18 KG/M2 | WEIGHT: 187.8 LBS | HEIGHT: 66 IN | SYSTOLIC BLOOD PRESSURE: 127 MMHG | HEART RATE: 77 BPM | DIASTOLIC BLOOD PRESSURE: 72 MMHG

## 2023-08-18 DIAGNOSIS — M25.562 CHRONIC PAIN OF LEFT KNEE: ICD-10-CM

## 2023-08-18 DIAGNOSIS — G89.29 CHRONIC PAIN OF LEFT KNEE: ICD-10-CM

## 2023-08-18 DIAGNOSIS — M17.12 PRIMARY OSTEOARTHRITIS OF LEFT KNEE: Primary | ICD-10-CM

## 2023-08-18 PROCEDURE — 20610 DRAIN/INJ JOINT/BURSA W/O US: CPT | Performed by: ORTHOPAEDIC SURGERY

## 2023-08-18 PROCEDURE — 99214 OFFICE O/P EST MOD 30 MIN: CPT | Performed by: ORTHOPAEDIC SURGERY

## 2023-08-18 RX ORDER — TRIAMCINOLONE ACETONIDE 40 MG/ML
40 INJECTION, SUSPENSION INTRA-ARTICULAR; INTRAMUSCULAR
Status: COMPLETED | OUTPATIENT
Start: 2023-08-18 | End: 2023-08-18

## 2023-08-18 RX ORDER — BUPIVACAINE HYDROCHLORIDE 2.5 MG/ML
2 INJECTION, SOLUTION INFILTRATION; PERINEURAL
Status: COMPLETED | OUTPATIENT
Start: 2023-08-18 | End: 2023-08-18

## 2023-08-18 RX ADMIN — BUPIVACAINE HYDROCHLORIDE 2 ML: 2.5 INJECTION, SOLUTION INFILTRATION; PERINEURAL at 09:45

## 2023-08-18 RX ADMIN — TRIAMCINOLONE ACETONIDE 40 MG: 40 INJECTION, SUSPENSION INTRA-ARTICULAR; INTRAMUSCULAR at 09:45

## 2023-08-18 NOTE — PROGRESS NOTES
Assessment/Plan:  1. Primary osteoarthritis of left knee      No orders of the defined types were placed in this encounter. · Patient's MRI of her left knee was reviewed in detail during her office visit. Patient did not have any meniscal pathology but has severe patellofemoral OA with cartilage loss of the patella and trochlea. · Discussed at length her treatment options at this time consisting of continued oral NSAIDs, physical therapy, bracing, corticosteroid injection, viscosupplementation injection, and possible future total knee arthroplasty if she does not have improvement from the conservative treatment options. · After a discussion of risks and benefits the patient elected to proceed with a L knee steroid injection today. Patient should ice and avoid strenuous activity for 1-2 days if needed. Patient should avoid vaccines for 2 weeks if possible. If patient is diabetic should also monitor glucose over the next 7 to 10 days. · Patient is to schedule PT   · Continue Naproxen pain control    Return in about 3 months (around 11/18/2023) for Recheck. I answered all of the patient's questions during the visit and provided education of the patient's condition during the visit. The patient verbalized understanding of the information given and agrees with the plan. This note was dictated using Spotwish software. It may contain errors including improperly dictated words. Please contact physician directly for any questions. Subjective   Chief Complaint:   Chief Complaint   Patient presents with   • Left Knee - Follow-up       HPI  Scarlet Newsome is a 45 y.o. female who presents for follow up to review Left knee MRI due left knee pain. Patient states that her knee pain has remained the same since her last appointment 2 weeks ago. Patient states that the majority of her pain is concentrated to the anterior aspect of the knee pointing to her kneecap and posteriorly.   Patient states that she has mild pain at rest and the pain becomes worse with weightbearing activities. Patient states that stairs are the most difficult activity stating that she has significant increase of pain anteriorly and posteriorly as well. Patient denies any clicking catching or locking sensations. Patient has continued to take her naproxen which she does provide some benefit but her activity is still limited. Patient reports that if she stands for too long period of time she has to sit down due to increased pain and if she sits for too long the period time she has to stand up due to increased pain. Patient denies starting physical therapy stating she is having difficulty with availability at the PT location that she would like to attend. Patient would like to consider corticosteroid injection at this time. Review of Systems  ROS:    See HPI for musculoskeletal review. All other systems reviewed are negative     History:  Past Medical History:   Diagnosis Date   • Cervical neuritis 05/16/2013    C7- T1 Left, Last assessed   • Endometriosis, cervix    • GERD (gastroesophageal reflux disease)    • Herpes    • Hiatal hernia     R INGUINAL   • Hypothyroidism    • Thyroid cancer [facility])     THYROID     Past Surgical History:   Procedure Laterality Date   • CHOLECYSTECTOMY  05/2020   • COLONOSCOPY  12/2015 2015  Frances Cristina. Normal exam, hemorrhoids. • COLONOSCOPY  2010    Dr. Frances Cristina. Normal exam, hemorrhoids. • Walt Fabian. Normal exam   • HERNIA REPAIR     • HYSTERECTOMY  2005    has ovaries   • [place] LAPAROSCOPY SURG CHOLECYSTECTOMY N/A 05/23/2019    Procedure: LAP.  ZANE., IOCG;  Surgeon: Roman Goldstein MD;  Location[facility];  Service: General   • THYROIDECTOMY      early 2008 [place]   • TUBAL LIGATION       Social History   Social History     Substance and Sexual Activity   Alcohol Use Not Currently    Comment: socially     Social History     Substance and Sexual Activity   Drug Use No     Social History     Tobacco Use   Smoking Status Never   Smokeless Tobacco Never     Family History:   Family History   Problem Relation Age of Onset   • Thyroid disease Mother    • Cancer Father    • Colon polyps Father    • Thyroid disease Sister    • Thyroid disease Brother    • Diabetes Maternal Grandmother        Current Outpatient Medications on File Prior to Visit   Medication Sig Dispense Refill   • Ascorbic Acid (VITAMIN C GUMMIE PO) Take by mouth daily     • aspirin 81 mg chewable tablet Chew 1 tablet (81 mg total) daily     • Calcium Citrate 200 MG TABS One tablet daily     • cholecalciferol (VITAMIN D3) 1,000 units tablet Take 2,000 Units by mouth daily      • Cyanocobalamin ER 1000 MCG TBCR Take 1 tablet by mouth daily     • Flaxseed Oil OIL Take 1 capsule by mouth daily      • Glucos-Chondroit-Collag-Hyal (GLUCOSAMINE CHONDROIT-COLLAGEN PO)      • levothyroxine 88 mcg tablet 100 mcg     • Lysine 1000 MG TABS Take 1,000 mg by mouth daily      • Magnesium 400 MG TABS Take 1 tablet by mouth daily      • Multiple Vitamins-Minerals (ZINC PO) Take by mouth     • naproxen (Naprosyn) 500 mg tablet Take 1 tablet (500 mg total) by mouth 2 (two) times a day with meals 60 tablet 1   • omega-3-acid ethyl esters (LOVAZA) 1 g capsule Take 2 g by mouth 2 (two) times a day     • predniSONE 10 mg tablet 4 tablets for 3 days then 3 tablets for 3 days then 2 tablets for 3 days then 1 tablet daily for 3 days (Patient not taking: Reported on 7/31/2023) 30 tablet 0   • pyridoxine (B-6) 100 MG tablet One tablet daily (Patient not taking: Reported on 7/31/2023)     • sodium picosulfate, magnesium oxide, citric acid (Clenpiq) 10-3.5-12 MG-GM -GM/160ML SOLN Take 1 kit by mouth see administration instructions 160 mL 0   • Turmeric 500 MG CAPS Take 1 tablet by mouth daily      • valACYclovir (VALTREX) 1,000 mg tablet Take 1 tablet (1,000 mg total) by mouth 2 (two) times a day for 1 day (Patient not taking: Reported on 5/18/2023) 4 tablet 1     No current facility-administered medications on file prior to visit. Allergies   Allergen Reactions   • Bee Venom Anaphylaxis   • Dust Mite Extract    • Gabapentin Dizziness     Annotation - [id number]: "felt like out of body experience"   • Molds & Smuts Sneezing   • Other      Annotation - [id number]: seasonal, Trees   • Pollen Extract    • Bernard Grass Pollen Allergen    • Latex Rash        Objective     /72   Pulse 77   Ht 5' 6" (1.676 m)   Wt 85.2 kg (187 lb 12.8 oz)   BMI 30.31 kg/m²      PE:  AAOx 3  WDWN  Hearing intact, no drainage from eyes  no audible wheezing  no abdominal distension  LE compartments soft, skin intact    Ortho Exam:  left Knee:   Appearance:  no swelling   No ecchymosis  no obvious joint deformity   No effusion  Palpation/Tenderness:  +TTP over anterior medial joint line  + TTP over lateral joint line    + TTP over medial and lateral patellar facet  No TTP over patellar tendon  + TTP over pes anserine bursa  + TTP over distal medial hamstring tendon  +TTP over distal IT band and lateral epicondyle  Active Range of Motion:  AROM: 3-110  PROM: 0-120  Special Tests:  Medial Flakita's Test: Negative  Lateral Flakita's Test:  Negative  Apley's compression test:  Negative  Lachman's Test:  negative  Anterior Drawer Test:  Negative  Patellar grind:  Positive  Valgus Stress Test:  negative  Varus Stress Test:  negative      No ipsilateral hip pain with ROM     left LE:    Sensation grossly intact L2-S1  Palpable TP pulse  AT/GS/EHL intact    Imaging Studies: I have personally reviewed pertinent films in PACS  And interpretation is as follows MRI of the left knee was reviewed and demonstrates no ligamentous or meniscal injuries. Patient does have cartilage loss concentrated over the medial facet of the patella and trochlea I have reviewed the radiologist report and agree with their impression number.     Large joint arthrocentesis: L knee  Universal Protocol:  Consent given by: patient  Time out: Immediately prior to procedure a "time out" was called to verify the correct patient, procedure, equipment, support staff and site/side marked as required.   Site marked: the operative site was marked  Supporting Documentation  Indications: pain   Procedure Details  Location: knee - L knee  Preparation: Patient was prepped and draped in the usual sterile fashion  Needle size: 22 G  Ultrasound guidance: no  Approach: anterolateral  Medications administered: 2 mL bupivacaine 0.25 %; 40 mg triamcinolone acetonide 40 mg/mL    Patient tolerance: patient tolerated the procedure well with no immediate complications  Dressing:  Sterile dressing applied

## 2023-08-24 ENCOUNTER — EVALUATION (OUTPATIENT)
Dept: PHYSICAL THERAPY | Facility: REHABILITATION | Age: 53
End: 2023-08-24
Payer: COMMERCIAL

## 2023-08-24 DIAGNOSIS — M23.92 INTERNAL DERANGEMENT OF LEFT KNEE: ICD-10-CM

## 2023-08-24 DIAGNOSIS — M17.12 PRIMARY OSTEOARTHRITIS OF LEFT KNEE: Primary | ICD-10-CM

## 2023-08-24 DIAGNOSIS — M25.562 LEFT KNEE PAIN, UNSPECIFIED CHRONICITY: ICD-10-CM

## 2023-08-24 PROCEDURE — 97161 PT EVAL LOW COMPLEX 20 MIN: CPT

## 2023-08-24 PROCEDURE — 97110 THERAPEUTIC EXERCISES: CPT

## 2023-08-24 NOTE — PROGRESS NOTES
PT Evaluation     Today's date: 2023  Patient name: Ashish Zambrano  : 1970  MRN: [id number]  Referring provider: Rad Ley  Dx:   Encounter Diagnosis     ICD-10-CM    1. Primary osteoarthritis of left knee  M17.12 Ambulatory referral to Physical Therapy      2. Internal derangement of left knee  M23.92 Ambulatory referral to Physical Therapy      3. Left knee pain, unspecified chronicity  M25.562 Ambulatory referral to Physical Therapy          Start Time: 730  Stop Time: 815  Total time in clinic (min): 45 minutes    Assessment  Assessment details: Patient is a 45 y.o. female presenting to initial examination with chief complaint of L knee pain that has been progressively worsening over the past few months. Signs and symptoms are consistent with referred diagnosis. Primary impairments include restricted and painful knee/hip ROM, gross weakness in the B/L LEs (L>R), restrictions in  HS extensibility, and poor biomechanics with functional transfers. As a result of impairments patient experiences limitations with functional/daily activities including those listed below. Educated patient regarding plan of care and answered all patient questions to patient satisfaction. Patient would benefit from skilled PT interventions to address above impairments, achieve goals, and to maximize function.  Thank you for the referral.   Impairments: abnormal muscle tone, abnormal or restricted ROM, activity intolerance, impaired physical strength, lacks appropriate home exercise program, pain with function, weight-bearing intolerance and poor body mechanics  Functional limitations: prolonged standing, prolonged walking, stair navigation, bending/squating, pushing vaccum, performing household cleaning, lifting laundry, sleeping on L side, exercising on elliptical  Symptom irritability: moderateUnderstanding of Dx/Px/POC: good   Prognosis: good    Goals  Impairment Goals: 4-6 weeks  - Patient to decrease pain to 1-2/10  - Patient to improve knee AROM to equal to uninvolved side  - Patient to increase knee strength to 5/5 throughout  - Patient to increase hip strength to 5/5 throughout    Functional Goals: by discharge  - Patient to discharge to independent HEP  - Patient to return to prior level of function  - Patient to ambulate in home and community without increased pain or difficulty   - Patient to ascend and descend stairs without increased pain or difficulty  - Patient to complete sit to stand transfers without increased pain or difficulty  - Patient to be able to perform household cleaning without increased pain or difficulty  - Patient to resume exercise on the elliptical without increased pain or difficulty      Plan  Patient would benefit from: skilled physical therapy  Planned modality interventions: thermotherapy: hydrocollator packs and cryotherapy  Planned therapy interventions: joint mobilization, manual therapy, abdominal trunk stabilization, balance/weight bearing training, neuromuscular re-education, patient education, postural training, home exercise program, therapeutic exercise, therapeutic activities, stretching, strengthening, body mechanics training and functional ROM exercises  Frequency: 1x week  Duration in visits: 4  Duration in weeks: 4  Plan of Care beginning date: 8/24/2023  Treatment plan discussed with: patient        Subjective Evaluation    History of Present Illness  Mechanism of injury: Amber Lu reports to PT with complaints of L knee pain that has been progressively getting worse over the past few months.   She describes the following regarding her symptoms:    - has been having pain in hip and went to doctor, told she has bursitis, then got a standing desk and this improved symptoms but hen L knee started to bother her   - has had L knee pain for years that would come and go, feelings of pain and stiffness  - 2-3 months ago the pain in the knee increased around knee cap and behind the knee and around outside of knee, couldn't even weight bear and was limping  - went to the doctor, initially thought there might be torn meniscus  - Got MRI that showed a loss of cartilage in the L knee, was told she has arthritis in both knees  - Just recently got cortisone shot on last Friday with some relief and has has been taking naproxen as well which has also assisted  - Now, pain in knee is primarily surrounding kneecap and slightly up the leg  - used to be primarily in the back of knee but now that is a stretching sensation  - strain in back of knee with bending  - pain is constant  -  Has been using ice and wears knee brace    Describes the following difficulties/restrictions:  - can sleep on side (is side sleeper) and this increases pain  - used to do elliptical for exercises and hasn't been able to, and has also been walking with neighbor (1-2 miles a night) and now she has been unable to do so (can only tolerate around the block now and it is tough - 0.5 miles)  - Bending down to do household cleaning very difficult  - pushing vacuum difficult  - has to go easy with stairs, wasn't able to do at all prior to shot  - works as a  and needs to use standing desk for hip pain which results in increased knee pain              Recurrent probem    Quality of life: good    Patient Goals  Patient goals for therapy: increased strength, independence with ADLs/IADLs, return to sport/leisure activities, increased motion, improved balance and decreased pain    Pain  Current pain rating: 3  At best pain rating: 3  At worst pain rating: 10  Quality: tight and dull ache  Relieving factors: medications and ice  Aggravating factors: walking, standing, stair climbing and lifting  Progression: no change    Social Support  Steps to enter house: yes  Stairs in house: yes   Lives in: multiple-level home    Employment status: working  Treatments  Previous treatment: injection treatment        Objective Palpation   Left   Hypertonic in the distal biceps femoris, lateral gastrocnemius, medial gastrocnemius and vastus lateralis. Tenderness of the distal biceps femoris, lateral gastrocnemius, medial gastrocnemius and vastus lateralis. Tenderness   Left Knee   Tenderness in the inferior patella, ITB, lateral joint line, lateral patella, medial joint line, medial patella, popliteal fossa and superior patella.      Active Range of Motion   Left Knee   Flexion: 115 degrees with pain  Extension: -1 degrees with pain    Right Knee   Flexion: 130 degrees   Extension: 1 degrees     Additional Active Range of Motion Details  Lacking 1 degree from neutral L knee EXT    Passive Range of Motion   Left Hip   Flexion: 130 degrees   External rotation (90/90): 40 degrees   Internal rotation (90/90): 35 degrees     Right Hip   Flexion: 130 degrees   External rotation (90/90): 50 degrees   Internal rotation (90/90): 40 degrees   Left Knee   Flexion: 120 degrees with pain  Extension: 0 degrees with pain    Right Knee   Flexion: 135 degrees   Extension: 2 degrees     Additional Passive Range of Motion Details  L HS 90/90: Lacking 40 degrees  R HS 90/90: Lacking 10 degrees      Mobility   Patellar Mobility:   Left Knee   Hypermobile: left superior and left inferior      Additional Mobility Details  Pain primarily noted with medial/lateral glides of L patella (medial > lateral)    Strength/Myotome Testing     Left Hip   Planes of Motion   Extension: 4-  Abduction: 4-  External rotation: 4-  Internal rotation: 4-    Right Hip   Planes of Motion   Extension: 4-  Abduction: 4  External rotation: 4  Internal rotation: 4+    Left Knee   Flexion: 4-  Extension: 4  Quadriceps contraction: good    Right Knee   Flexion: 4-  Extension: 5  Quadriceps contraction: good    Left Ankle/Foot   Dorsiflexion: 4+  Plantar flexion: 4+  Inversion: 4  Eversion: 4    Right Ankle/Foot   Dorsiflexion: 4+  Plantar flexion: 4+  Inversion: 4  Eversion: 4    General Comments:      Knee Comments  Swelling currently not present in the L knee though patient notes a decent amount of swelling by the end of the day after loading the leg all day             Precautions: N/A      Manuals 8/24                                                                Neuro Re-Ed             Wes Seal + Eccentric Lower PLAN            Clamshells vs TB PLAN            Chair Squats @ Bar + Hinge Cues PLAN            Standing Hip EXT PLAN                                                   Ther Ex             Seated HS Stretch 3x10"            Standing Hip ABD 2x10            Supine Figure 4 Stretch PLAN            Calf Stretch PLAN            Leg Press PLAN                                                   Ther Activity                                       Gait Training                                       Modalities

## 2023-08-25 ENCOUNTER — OFFICE VISIT (OUTPATIENT)
Dept: PHYSICAL THERAPY | Facility: REHABILITATION | Age: 53
End: 2023-08-25
Payer: COMMERCIAL

## 2023-08-25 DIAGNOSIS — M25.562 LEFT KNEE PAIN, UNSPECIFIED CHRONICITY: ICD-10-CM

## 2023-08-25 DIAGNOSIS — M17.12 PRIMARY OSTEOARTHRITIS OF LEFT KNEE: Primary | ICD-10-CM

## 2023-08-25 DIAGNOSIS — M23.92 INTERNAL DERANGEMENT OF LEFT KNEE: ICD-10-CM

## 2023-08-25 PROCEDURE — 97112 NEUROMUSCULAR REEDUCATION: CPT

## 2023-08-25 PROCEDURE — 97110 THERAPEUTIC EXERCISES: CPT

## 2023-08-25 RX ORDER — NAPROXEN 500 MG/1
TABLET, DELAYED RELEASE ORAL
Qty: 60 TABLET | Refills: 1 | Status: SHIPPED | OUTPATIENT
Start: 2023-08-25

## 2023-08-25 NOTE — PROGRESS NOTES
Daily Note     Today's date: 2023  Patient name: Raulito Marcum  : 1970  MRN: [id number]  Referring provider: Walt Moody  Dx:   Encounter Diagnosis     ICD-10-CM    1. Primary osteoarthritis of left knee  M17.12       2. Internal derangement of left knee  M23.92       3. Left knee pain, unspecified chronicity  M25.562           Start Time: 1100  Stop Time: 1145  Total time in clinic (min): 45 minutes    Subjective: Baltimore Brochure notes that the knee is a little ramped up and she mention that her hips were particularly sore after initiating some of her hip strengthening. Other than that she is doing a bit better today. Objective: See treatment diary below      Assessment: Tolerated treatment well. Patient demonstrated fatigue post treatment, exhibited good technique with therapeutic exercises and would benefit from continued PT. Patient was capable of initiating LE strengthening as well as biomechanics work with appropriate symptom response. Exercises reviewed in clinic provided to HEP. Patient educated regarding appropriate form, dosage, and symptom response. Will continue to progress as tolerated. Plan: Continue per plan of care.       Precautions: N/A      Manuals                                                                Neuro Re-Ed             Keke Bernstein + Eccentric Lower PLAN 2x10           Clamshells vs TB PLAN 2x10 (RTB)           Chair Squats @ Bar + Hinge Cues PLAN 2x10           Standing Hip EXT PLAN                                                   Ther Ex             Seated HS Stretch 3x10" 3x30"           Standing Hip ABD 2x10 2x10 (SL)           Supine Figure 4 Stretch PLAN 3x30"           Calf Stretch PLAN            Leg Press PLAN                                                   Ther Activity                                       Gait Training                                       Modalities

## 2023-08-30 ENCOUNTER — OFFICE VISIT (OUTPATIENT)
Dept: PHYSICAL THERAPY | Facility: REHABILITATION | Age: 53
End: 2023-08-30
Payer: COMMERCIAL

## 2023-08-30 DIAGNOSIS — M23.92 INTERNAL DERANGEMENT OF LEFT KNEE: ICD-10-CM

## 2023-08-30 DIAGNOSIS — M25.562 LEFT KNEE PAIN, UNSPECIFIED CHRONICITY: ICD-10-CM

## 2023-08-30 DIAGNOSIS — M17.12 PRIMARY OSTEOARTHRITIS OF LEFT KNEE: Primary | ICD-10-CM

## 2023-08-30 PROCEDURE — 97112 NEUROMUSCULAR REEDUCATION: CPT

## 2023-08-30 PROCEDURE — 97110 THERAPEUTIC EXERCISES: CPT

## 2023-08-30 NOTE — PROGRESS NOTES
Daily Note     Today's date: 2023  Patient name: Valdo Benites  : 1970  MRN: [id number]  Referring provider: Ayala Lewis  Dx:   Encounter Diagnosis     ICD-10-CM    1. Primary osteoarthritis of left knee  M17.12       2. Internal derangement of left knee  M23.92       3. Left knee pain, unspecified chronicity  M25.562                      Subjective: Shala Poole notes that her knee is slightly ramped up today below her knee cap. Last 2 days have been trying not to take any medication and in general has been holding up okay. Went walking last night and did a little over a mile and held up okay though the knee began to become aggravated     Objective: See treatment diary below      Assessment: Tolerated treatment well. Patient demonstrated fatigue post treatment, exhibited good technique with therapeutic exercises and would benefit from continued PT. Band color regressed for clamshells to avoid symptom aggravation at the hip.  walk and calf raises added to todays session with appropriate symptom response. Will continue to progress as tolerated. Plan: Continue per plan of care.       Precautions: N/A      Manuals                                                               Neuro Re-Ed             Taye Cardona + Eccentric Lower PLAN 2x10 3x10          Clamshells vs TB PLAN 2x10 (RTB) 2x10 (YTB)          Chair Squats @ Bar + Hinge Cues PLAN 2x10 3x10 (YTB)          Standing Hip EXT PLAN             Walks   3x30" (YTB)          Eccentric Calf Raises   2x10                       Ther Ex             Seated HS Stretch 3x10" 3x30"           Standing Hip ABD 2x10 2x10 (SL) 2x10 (SL)          Supine Figure 4 Stretch PLAN 3x30" 3x30"          Calf Stretch PLAN            Leg Press PLAN                                                   Ther Activity                                       Gait Training                                       Modalities

## 2023-09-08 ENCOUNTER — OFFICE VISIT (OUTPATIENT)
Dept: PHYSICAL THERAPY | Facility: REHABILITATION | Age: 53
End: 2023-09-08
Payer: COMMERCIAL

## 2023-09-08 DIAGNOSIS — M17.12 PRIMARY OSTEOARTHRITIS OF LEFT KNEE: Primary | ICD-10-CM

## 2023-09-08 DIAGNOSIS — M25.562 LEFT KNEE PAIN, UNSPECIFIED CHRONICITY: ICD-10-CM

## 2023-09-08 DIAGNOSIS — M23.92 INTERNAL DERANGEMENT OF LEFT KNEE: ICD-10-CM

## 2023-09-08 PROCEDURE — 97112 NEUROMUSCULAR REEDUCATION: CPT

## 2023-09-08 PROCEDURE — 97110 THERAPEUTIC EXERCISES: CPT

## 2023-09-08 NOTE — PROGRESS NOTES
Daily Note     Today's date: 2023  Patient name: Segundo Coronel  : 1970  MRN: [id number]  Referring provider: Guero Martinez  Dx:   Encounter Diagnosis     ICD-10-CM    1. Primary osteoarthritis of left knee  M17.12       2. Internal derangement of left knee  M23.92       3. Left knee pain, unspecified chronicity  M25.562           Start Time: 730  Stop Time: 0815  Total time in clinic (min): 45 minutes    Subjective: Anita Santos reports that things have been going well. Did a lot of house cleaning over the weekend into Monday and reports this increased knee pain but not as significantly as it normally would, and would simmer down with breaks where prior it would stay ramped up. She is happy with this. States that she has also been able to stay off any kind of medication for pain except for once over the weekend with her increased level of activity. Objective: See treatment diary below      Assessment: Tolerated treatment well. Patient demonstrated fatigue post treatment, exhibited good technique with therapeutic exercises and would benefit from continued PT. Additional WB interventions added today with appropriate symptom response and reduced symptom irritability compared to last session. Capable of progressing quad strengthening with addition of leg press today. Slight increase in knee pain post session (4/10) though patient plans to follow up with ice at home. Plan to re-assess at time of NV       Plan: Progress note during next visit.       Precautions: N/A      Manuals                                                              Neuro Re-Ed             Jonatan Lime + Eccentric Lower PLAN 2x10 3x10          Clamshells vs TB PLAN 2x10 (RTB) 2x10 (YTB)          Chair Squats @ Bar + Hinge Cues PLAN 2x10 3x10 (YTB) 3x10 (YTB) to tap         Standing Hip EXT PLAN             Walks   3x30" (YTB) 3x30" (GTB)         Eccentric Calf Raises   2x10 2x10         Monster Walks    6 Laps (GTB)         Ther Ex             Seated HS Stretch 3x10" 3x30"           Standing Hip ABD 2x10 2x10 (SL) 2x10 (SL)          Supine Figure 4 Stretch PLAN 3x30" 3x30"          Calf Stretch PLAN            Leg Press PLAN            Rec Bike    5min         Elliptical    5min         Leg Press + Eccentric Lower    2x10 (#105)         Ther Activity                                       Gait Training                                       Modalities

## 2023-09-13 ENCOUNTER — OFFICE VISIT (OUTPATIENT)
Dept: PHYSICAL THERAPY | Facility: REHABILITATION | Age: 53
End: 2023-09-13
Payer: COMMERCIAL

## 2023-09-13 DIAGNOSIS — M17.12 PRIMARY OSTEOARTHRITIS OF LEFT KNEE: Primary | ICD-10-CM

## 2023-09-13 DIAGNOSIS — M25.562 LEFT KNEE PAIN, UNSPECIFIED CHRONICITY: ICD-10-CM

## 2023-09-13 DIAGNOSIS — M23.92 INTERNAL DERANGEMENT OF LEFT KNEE: ICD-10-CM

## 2023-09-13 PROCEDURE — 97110 THERAPEUTIC EXERCISES: CPT

## 2023-09-13 PROCEDURE — 97112 NEUROMUSCULAR REEDUCATION: CPT

## 2023-09-13 NOTE — PROGRESS NOTES
Daily Note     Today's date: 2023  Patient name: Sanjana Lee  : 1970  MRN: [id number]  Referring provider: Dom Saenz  Dx:   Encounter Diagnosis     ICD-10-CM    1. Primary osteoarthritis of left knee  M17.12       2. Internal derangement of left knee  M23.92       3. Left knee pain, unspecified chronicity  M25.562                      Subjective: Amber Lu reports that she is doing well today thinking that next visit will be her last. Notes that she was sore for about a day or so following last session but overall happy with her progress. She does note a dull ache in her L hip at times depending on what she is doing. She went for a 1 mile walk last night with no complaints. Objective: See treatment diary below      Assessment: Tolerated treatment well. Patient demonstrated fatigue post treatment, exhibited good technique with therapeutic exercises and would benefit from continued PT. Continued to focus on knees mobility and strengthening with good functional form present throughout. She was bale to perform the leg press with less difficulty compared to last session. Plan to re-assess at time of NV       Plan: Progress note during next visit. Precautions: N/A      Manuals                                                             Neuro Re-Ed             Majo Lo + Eccentric Lower PLAN 2x10 3x10          Clamshells vs TB PLAN 2x10 (RTB) 2x10 (YTB)          Chair Squats @ Bar + [employer]. Iline Meri  PLAN 2x10 3x10 (YTB) 3x10 (YTB) to tap 3x10 (YTB) to tap        Standing Hip EXT PLAN             Walks   3x30" (YTB) 3x30" (GTB) 3x30" (GTB)          Eccentric Calf Raises   2x10 2x10 2x10        Monster Walks    6 Laps (GTB) 6 laps (GTB)        Ther Ex             Seated HS Stretch 3x10" 3x30"           Standing Hip ABD 2x10 2x10 (SL) 2x10 (SL)          Supine Figure 4 Stretch PLAN 3x30" 3x30"          Calf Stretch PLAN            Leg Press PLAN Rec Bike    5min 10 min        Elliptical    5min 5 min        Leg Press + Eccentric Lower    2x10 (#105) 2x10 (#105) (#115)       Ther Activity                                       Gait Training                                       Modalities

## 2023-09-18 ENCOUNTER — APPOINTMENT (OUTPATIENT)
Dept: PHYSICAL THERAPY | Facility: REHABILITATION | Age: 53
End: 2023-09-18
Payer: COMMERCIAL

## 2023-09-20 ENCOUNTER — APPOINTMENT (OUTPATIENT)
Dept: PHYSICAL THERAPY | Facility: REHABILITATION | Age: 53
End: 2023-09-20
Payer: COMMERCIAL

## 2023-09-21 DIAGNOSIS — M17.12 PRIMARY OSTEOARTHRITIS OF LEFT KNEE: ICD-10-CM

## 2023-09-21 RX ORDER — NAPROXEN 500 MG/1
TABLET, DELAYED RELEASE ORAL
Qty: 60 TABLET | Refills: 1 | Status: SHIPPED | OUTPATIENT
Start: 2023-09-21

## 2023-09-22 ENCOUNTER — EVALUATION (OUTPATIENT)
Dept: PHYSICAL THERAPY | Facility: REHABILITATION | Age: 53
End: 2023-09-22
Payer: COMMERCIAL

## 2023-09-22 DIAGNOSIS — M23.92 INTERNAL DERANGEMENT OF LEFT KNEE: ICD-10-CM

## 2023-09-22 DIAGNOSIS — M17.12 PRIMARY OSTEOARTHRITIS OF LEFT KNEE: Primary | ICD-10-CM

## 2023-09-22 DIAGNOSIS — M25.562 LEFT KNEE PAIN, UNSPECIFIED CHRONICITY: ICD-10-CM

## 2023-09-22 PROCEDURE — 97112 NEUROMUSCULAR REEDUCATION: CPT

## 2023-09-22 PROCEDURE — 97110 THERAPEUTIC EXERCISES: CPT

## 2023-09-22 NOTE — PROGRESS NOTES
PT Evaluation     Today's date: 2023  Patient name: Monica Villagomez  : 1970  MRN: [id number]  Referring provider: Myles Armijo  Dx:   Encounter Diagnosis     ICD-10-CM    1. Primary osteoarthritis of left knee  M17.12       2. Internal derangement of left knee  M23.92       3. Left knee pain, unspecified chronicity  M25.562           Start Time: 0145  Stop Time: 0230  Total time in clinic (min): 45 minutes    Assessment  Assessment details: Patient is a 45 y.o. female presenting to initial examination with chief complaint of L knee pain that has been progressively worsening over the past few months. Patient has been compliant with physical therapy and has achieved functional goals at this time. Patient exhibits functional improvements including no longer experiencing limitations with the below listed previous functional limitations concurrent with improvements in B/L knee and hip strength, ROM, and functional strength/endurance. Patient is discharged to independent [facility]. Reviewed HEP, educated patient regarding discharge plan, and answered all patient questions to satisfaction. Good prognosis.     Functional limitations: prolonged standing, prolonged walking, stair navigation, bending/squating, pushing vaccum, performing household cleaning, lifting laundry, sleeping on L side, exercising on elliptical  Symptom irritability: lowUnderstanding of Dx/Px/POC: good   Prognosis: good    Goals  Impairment Goals: 4-6 weeks  - Patient to decrease pain to 1-2/10 (85% MET)  - Patient to improve knee AROM to equal to uninvolved side (MET)  - Patient to increase knee strength to 5/5 throughout (MET)  - Patient to increase hip strength to 5/5 throughout (MET)    Functional Goals: by discharge  - Patient to discharge to independent HEP (MET)  - Patient to return to prior level of function (MET)  - Patient to ambulate in home and community without increased pain or difficulty (MET)  - Patient to ascend and descend stairs without increased pain or difficulty (MET)  - Patient to complete sit to stand transfers without increased pain or difficulty (MET)  - Patient to be able to perform household cleaning without increased pain or difficulty (MET)  - Patient to resume exercise on the elliptical without increased pain or difficulty (NOT MET)      Plan  Patient would benefit from: skilled physical therapy  Treatment plan discussed with: patient        Subjective Evaluation    History of Present Illness  Mechanism of injury: Davina Grissom reports that she has seen significant improvements since starting PT and that she feels as though she is ready to make today her last day. She notes that she is no longer experiencing restrictions in her day to day activities and she has been able to consistently progress her exercises and activities at home, most notably with increasing her walking distance without her knee being a limitation. She feels as though she can continue to make strides forward with her HEP independently at home and she can hold off getting a knee replacement with the program she currently has in place          Recurrent probem    Quality of life: good    Patient Goals  Patient goals for therapy: increased strength, independence with ADLs/IADLs, return to sport/leisure activities, increased motion, improved balance and decreased pain    Pain  Current pain ratin  At best pain ratin  At worst pain ratin  Quality: tight and dull ache  Relieving factors: medications and ice  Aggravating factors: walking, standing, stair climbing and lifting  Progression: improved    Social Support  Steps to enter house: yes  Stairs in house: yes   Lives in: multiple-level home    Employment status: working  Treatments  Previous treatment: injection treatment        Objective     Palpation   Left   Hypertonic in the distal biceps femoris, lateral gastrocnemius and medial gastrocnemius.    Tenderness of the distal biceps femoris, lateral gastrocnemius and medial gastrocnemius. Tenderness   Left Knee   Tenderness in the lateral joint line, lateral patella, medial joint line, medial patella and popliteal fossa. No tenderness in the inferior patella, ITB and superior patella. Active Range of Motion   Left Knee   Flexion: 125 degrees   Extension: 1 degrees     Right Knee   Flexion: 130 degrees   Extension: 1 degrees     Passive Range of Motion   Left Hip   Flexion: 130 degrees   External rotation (90/90): 60 degrees   Internal rotation (90/90): 35 degrees     Right Hip   Flexion: 130 degrees   External rotation (90/90): 60 degrees   Internal rotation (90/90): 40 degrees   Left Knee   Flexion: 130 degrees   Extension: 1 degrees     Right Knee   Flexion: 135 degrees   Extension: 2 degrees     Additional Passive Range of Motion Details  L HS 90/90: Lacking 5 degrees  R HS 90/90: Lacking 5 degrees      Mobility   Patellar Mobility:   Left Knee   WFL: medial, lateral, superior and inferior. Strength/Myotome Testing     Left Hip   Planes of Motion   Extension: 4+  Abduction: 4+  External rotation: 4+  Internal rotation: 4+    Right Hip   Planes of Motion   Extension: 4+  Abduction: 4+  External rotation: 4+  Internal rotation: 4+    Left Knee   Flexion: 5  Extension: 5  Quadriceps contraction: good    Right Knee   Flexion: 5  Extension: 5  Quadriceps contraction: good    Left Ankle/Foot   Dorsiflexion: 5  Plantar flexion: 5  Inversion: 4+  Eversion: 4+    Right Ankle/Foot   Dorsiflexion: 5  Plantar flexion: 5  Inversion: 4+  Eversion: 4+    Functional Assessment        Comments  30sec STS Assessment:  - x14 repetitions [facility])             Precautions: N/A      Manuals 8/24 8/25 8/30 9/08 9/13 9/22                                                          Neuro Re-Ed             Dulcie Bamberger + Eccentric Lower PLAN 2x10 3x10          Clamshells vs TB PLAN 2x10 (RTB) 2x10 (YTB)          Chair Squats @ Bar + [employer]. Caffie Fortino  PLAN 2x10 3x10 (YTB) 3x10 (YTB) to tap 3x10 (YTB) to tap        Standing Hip EXT PLAN             Walks   3x30" (YTB) 3x30" (GTB) 3x30" (GTB)          Eccentric Calf Raises   2x10 2x10 2x10        Monster Walks    6 Laps (GTB) 6 laps ([place] Lo)        Wu Day M.T.       Ther Ex             Seated HS Stretch 3x10" 3x30"           Standing Hip ABD 2x10 2x10 (SL) 2x10 (SL)          Supine Figure 4 Stretch PLAN 3x30" 3x30"          Calf Stretch PLAN            Leg Press PLAN            Rec Bike    5min 10 min  10min      Elliptical    5min 5 min        Leg Press + Eccentric Lower    2x10 (#105) 2x10 (#105) (#115)       Ther Activity                                       Gait Training                                       Modalities

## 2023-10-05 ENCOUNTER — TELEPHONE (OUTPATIENT)
Dept: ADMINISTRATIVE | Facility: OTHER | Age: 53
End: 2023-10-05

## 2023-10-05 NOTE — TELEPHONE ENCOUNTER
----- Message from [facility] sent at 10/4/2023  3:45 PM EDT -----  10/04/23 3:45 PM    Hello, our patient Ra Lee has had Mammogram completed/performed. Please assist in updating the patient chart by pulling the Care Everywhere (CE) document. The date of service is 9/28/2023.      Thank you,  Samara Sommer

## 2023-10-06 NOTE — TELEPHONE ENCOUNTER
Upon review of the In Basket request we were able to locate, review, and update the patient chart as requested for Mammogram.    Any additional questions or concerns should be emailed to the Practice Liaisons via the appropriate education email address, please do not reply via In Basket.     Thank you  Ramírez Carrera

## 2023-11-02 ENCOUNTER — TELEPHONE (OUTPATIENT)
Dept: FAMILY MEDICINE CLINIC | Facility: CLINIC | Age: 53
End: 2023-11-02

## 2023-11-02 DIAGNOSIS — M70.62 TROCHANTERIC BURSITIS OF LEFT HIP: Primary | ICD-10-CM

## 2023-11-02 NOTE — TELEPHONE ENCOUNTER
Patient called stating since the last time she was in with you, her hip bursitis is getting really bady, she has been icing and using heat with no relieve, it is constant pain when she stands, sits, and lays down. What would you suggest that she needs to do next. Please advise patient at [phone].

## 2023-11-09 ENCOUNTER — OFFICE VISIT (OUTPATIENT)
Dept: OBGYN CLINIC | Facility: CLINIC | Age: 53
End: 2023-11-09
Payer: COMMERCIAL

## 2023-11-09 VITALS
SYSTOLIC BLOOD PRESSURE: 110 MMHG | BODY MASS INDEX: 30.6 KG/M2 | HEIGHT: 66 IN | WEIGHT: 190.4 LBS | DIASTOLIC BLOOD PRESSURE: 74 MMHG | HEART RATE: 78 BPM

## 2023-11-09 DIAGNOSIS — M70.62 TROCHANTERIC BURSITIS OF LEFT HIP: Primary | ICD-10-CM

## 2023-11-09 DIAGNOSIS — M25.552 PAIN IN LEFT HIP: ICD-10-CM

## 2023-11-09 PROCEDURE — 99214 OFFICE O/P EST MOD 30 MIN: CPT | Performed by: ORTHOPAEDIC SURGERY

## 2023-11-09 NOTE — PROGRESS NOTES
Assessment/Plan     1. Trochanteric bursitis of left hip    2. Pain in left hip      Orders Placed This Encounter   Procedures    XR hip/pelv 2-3 vws left if performed     Patient is experiencing left greater trochanteric bursitis   Diclofenac was prescribed to patient today  Can consider steroid injection  Can take Tylenol 1,000mg by mouth every 8 hours as needed for pain. Do not exceed 3,000mg per day. Return in about 2 months (around 1/9/2024). I answered all of the patient's questions during the visit and provided education of the patient's condition during the visit. The patient verbalized understanding of the information given and agrees with the plan. This note was dictated using [employer] software. It may contain errors including improperly dictated words. Please contact physician directly for any questions. History of Present Illness   Chief complaint:   Chief Complaint   Patient presents with    Left Hip - Pain       HPI: Lorelei Dean is a 52 y.o. female that c/o left hip pain. Patient states it has been going on for 4 months and states it is progressively getting worse. Patient states her pain is located along the lateral aspect of her left hip with activities such as sleeping, driving, prolonged sitting. Patient states she went to PCP and states her PCP mentioned it could be left greater troches bursitis which is why she is here today. Patient states she only takes naproxen that was given for her left knee in the past which provides no relief for her. Patient states she had no previous surgeries and no form of injection done to the left hip. Patient states she did have exercises given from her physical therapist when she was going for her left knee and states she keeps up with her home exercise program which provides minimal relief for her. Patient states she would be open to going to physical therapy to focus on left hip depending on treatment plan today.     ROS:    See HPI for musculoskeletal review. All other systems reviewed are negative     Historical Information   Past Medical History:   Diagnosis Date    Cervical neuritis 05/16/2013    C7- T1 Left, Last assessed    Endometriosis, cervix     GERD (gastroesophageal reflux disease)     Herpes     Hiatal hernia     R INGUINAL    Hypothyroidism     Thyroid cancer [facility])     THYROID     Past Surgical History:   Procedure Laterality Date    CHOLECYSTECTOMY  05/2020    COLONOSCOPY  12/2015 2015  Gilbert Frank. Normal exam, hemorrhoids. COLONOSCOPY  2010    Dr. Gilbert Frank. Normal exam, hemorrhoids. [place]ane April    Dr. Eduardo Higgins. Normal exam    HERNIA REPAIR      HYSTERECTOMY  2005    has ovaries    GA LAPAROSCOPY SURG CHOLECYSTECTOMY N/A 05/23/2019    Procedure: LAP.  ZANE., IOCG;  Surgeon: Ingrid Grider MD;  Location: [facility];  Service: General    THYROIDECTOMY      early 2008 [place]    TUBAL LIGATION       Social History   Social History     Substance and Sexual Activity   Alcohol Use Not Currently    Comment: socially     Social History     Substance and Sexual Activity   Drug Use No     Social History     Tobacco Use   Smoking Status Never   Smokeless Tobacco Never     Family History:   Family History   Problem Relation Age of Onset    Thyroid disease Mother     Cancer Father     Colon polyps Father     Thyroid disease Sister     Thyroid disease Brother     Diabetes Maternal Grandmother        Current Outpatient Medications on File Prior to Visit   Medication Sig Dispense Refill    Ascorbic Acid (VITAMIN C GUMMIE PO) Take by mouth daily      aspirin 81 mg chewable tablet Chew 1 tablet (81 mg total) daily      Calcium Citrate 200 MG TABS One tablet daily      cholecalciferol (VITAMIN D3) 1,000 units tablet Take 2,000 Units by mouth daily       Cyanocobalamin ER 1000 MCG TBCR Take 1 tablet by mouth daily      Flaxseed Oil OIL Take 1 capsule by mouth daily       Glucos-Chondroit-Collag-Hyal (GLUCOSAMINE CHONDROIT-COLLAGEN PO) levothyroxine 88 mcg tablet 100 mcg      Lysine 1000 MG TABS Take 1,000 mg by mouth daily       Magnesium 400 MG TABS Take 1 tablet by mouth daily       Multiple Vitamins-Minerals (ZINC PO) Take by mouth      naproxen (Naprosyn) 500 mg tablet Take 1 tablet (500 mg total) by mouth 2 (two) times a day with meals 60 tablet 1    omega-3-acid ethyl esters (LOVAZA) 1 g capsule Take 2 g by mouth 2 (two) times a day      predniSONE 10 mg tablet 4 tablets for 3 days then 3 tablets for 3 days then 2 tablets for 3 days then 1 tablet daily for 3 days (Patient not taking: Reported on 7/31/2023) 30 tablet 0    pyridoxine (B-6) 100 MG tablet One tablet daily (Patient not taking: Reported on 7/31/2023)      sodium picosulfate, magnesium oxide, citric acid (Clenpiq) 10-3.5-12 MG-GM -GM/160ML SOLN Take 1 kit by mouth see administration instructions 160 mL 0    Turmeric 500 MG CAPS Take 1 tablet by mouth daily       valACYclovir (VALTREX) 1,000 mg tablet Take 1 tablet (1,000 mg total) by mouth 2 (two) times a day for 1 day (Patient not taking: Reported on 5/18/2023) 4 tablet 1     No current facility-administered medications on file prior to visit. Allergies   Allergen Reactions    Bee Venom Anaphylaxis    Dust Mite Extract     Gabapentin Dizziness     Annotation - [id number]: "felt like out of body experience"    Molds & Smuts Sneezing    Other      Annotation - [id number]: seasonal, Trees    Pollen Extract     Bernard Grass Pollen Allergen     Diclofenac Rash     tolerates naproxen    Latex Rash       Objective   Vitals: Blood pressure 110/74, pulse 78, height 5' 6" (1.676 m), weight 86.4 kg (190 lb 6.4 oz). ,Body mass index is 30.73 kg/m². PE:  AAOx 3  WDWN  Hearing intact, no drainage from eyes  Regular rate  no audible wheezing  no abdominal distension  LE compartments soft, skin intact    left hip:   No dislocation/deformity  Neg. [facility]  ROM: full  Neg. Efrain Test  Neg.  Impingement test  No TTP over greater trochanter  Abduction: 5/5  Neg. Joie's test  No TTP over SIJ    leftLE:    Sensation grossly intact   Palpable 2+ pulse  AT/GS intact    Back:    No TTP over lumbar spinous processes, paraspinal musculature  SLR: Neg. Imaging Studies: I have personally reviewed pertinent films in PACS  lefthip:    Mild osteoarthritis in left hip.     Scribe Attestation      I,:  [facility] am acting as a scribe while in the presence of the attending physician.:       I,:  Srinivasan Watts, DO personally performed the services described in this documentation    as scribed in my presence.:

## 2023-11-10 ENCOUNTER — TELEPHONE (OUTPATIENT)
Age: 53
End: 2023-11-10

## 2023-11-10 RX ORDER — DICLOFENAC SODIUM 75 MG/1
75 TABLET, DELAYED RELEASE ORAL 2 TIMES DAILY PRN
Qty: 60 TABLET | Refills: 1 | Status: SHIPPED | OUTPATIENT
Start: 2023-11-10

## 2023-11-10 NOTE — TELEPHONE ENCOUNTER
Caller: Patient    Doctor: Francy Winchester    Reason for call: Pt was seen yesterday and told a script for Diclofenac was being sent in for her, but it was not. Pt uses [address]  Please notify pt when called in.     Call back#: [phone]

## 2023-11-13 DIAGNOSIS — M17.12 PRIMARY OSTEOARTHRITIS OF LEFT KNEE: ICD-10-CM

## 2023-11-13 RX ORDER — NAPROXEN 500 MG/1
TABLET, DELAYED RELEASE ORAL
Qty: 60 TABLET | Refills: 1 | Status: SHIPPED | OUTPATIENT
Start: 2023-11-13

## 2023-11-14 ENCOUNTER — TELEPHONE (OUTPATIENT)
Age: 53
End: 2023-11-14

## 2023-11-14 NOTE — TELEPHONE ENCOUNTER
Caller: Patient    Doctor: Benita Ledezma    Reason for call: Patient started taking dicofenac 75m  twice a day on 11/10/23 and she is very itchy on her face, neck, head and arms. She has stopped taking the medication. Please advice.     Call back#: [phone]

## 2023-11-14 NOTE — TELEPHONE ENCOUNTER
Caller: Patient    Doctor: Dr Valencia Hager    Reason for call: Patient is calling asking for something for pain. Patient stated naproxen did not really help with pain. Patient is also asking for a PT script. Please call patient when script is ready. ty  Please advise.     Call back#: [phone]

## 2023-11-14 NOTE — TELEPHONE ENCOUNTER
Thanks for addressing this. Agree with recs. She should monitor closely for any worsening particularly facial/tongue swelling. If any worsening will have to go to ED.

## 2023-11-14 NOTE — TELEPHONE ENCOUNTER
Called and spoke w/pt and she started Diclofenac on 11/10/23 twice a day. She started with redness, warmth, itching on face, head, neck, arms and upper chest.  Feels little bumps but not broken out no hives. Denies SOB, swelling to lips, tongue,and throat. Did have a cough. Pt just stopped med. Advised to take antihistamine such as benadryl or other OTC allergy med, like claritin, zyrtec, allegra for the antihistamine effect. Advised if has any resp issues call 911 and go to ED. She has stopped Diclofenac. Please review and advise. Thank you.

## 2023-11-14 NOTE — TELEPHONE ENCOUNTER
Caller: Patient    Doctor: Adrianne Lepe    Reason for call: Patient is returning Jill's call. She will be available for the rest of the day. Please return her call.     Call back#: [phone]

## 2023-11-15 DIAGNOSIS — M17.12 PRIMARY OSTEOARTHRITIS OF LEFT KNEE: Primary | ICD-10-CM

## 2023-11-15 NOTE — TELEPHONE ENCOUNTER
Caller: Patient    Doctor: Valencia Hager    Reason for call: Patient returned Laura's call. Questioned if order for PT was placed?  Please contact patient to discuss    Call back#: [phone]

## 2023-11-15 NOTE — TELEPHONE ENCOUNTER
Attempted to call patient with no response. I would like to discuss what oral medications she has tried so far as well as the allergic reaction she had with diclofenac. Requested call back from patient.

## 2023-11-16 NOTE — TELEPHONE ENCOUNTER
Spoke with patient. She has recovered from allergic reaction to diclofenac. She is back on naproxen which she would like to continue for now. Declined script for celebrex which we could consider. PT script in chart. Patient has follow up next week. Diclofenac allergy was added to chart. No other questions.

## 2023-11-21 ENCOUNTER — OFFICE VISIT (OUTPATIENT)
Dept: OBGYN CLINIC | Facility: MEDICAL CENTER | Age: 53
End: 2023-11-21
Payer: COMMERCIAL

## 2023-11-21 VITALS
BODY MASS INDEX: 30.76 KG/M2 | HEART RATE: 89 BPM | HEIGHT: 66 IN | WEIGHT: 191.4 LBS | DIASTOLIC BLOOD PRESSURE: 79 MMHG | SYSTOLIC BLOOD PRESSURE: 136 MMHG

## 2023-11-21 DIAGNOSIS — M17.12 PRIMARY OSTEOARTHRITIS OF LEFT KNEE: Primary | ICD-10-CM

## 2023-11-21 PROCEDURE — 20610 DRAIN/INJ JOINT/BURSA W/O US: CPT | Performed by: ORTHOPAEDIC SURGERY

## 2023-11-21 PROCEDURE — 99213 OFFICE O/P EST LOW 20 MIN: CPT | Performed by: ORTHOPAEDIC SURGERY

## 2023-11-21 RX ORDER — BUPIVACAINE HYDROCHLORIDE 2.5 MG/ML
2 INJECTION, SOLUTION INFILTRATION; PERINEURAL
Status: COMPLETED | OUTPATIENT
Start: 2023-11-21 | End: 2023-11-21

## 2023-11-21 RX ORDER — TRIAMCINOLONE ACETONIDE 40 MG/ML
40 INJECTION, SUSPENSION INTRA-ARTICULAR; INTRAMUSCULAR
Status: COMPLETED | OUTPATIENT
Start: 2023-11-21 | End: 2023-11-21

## 2023-11-21 RX ADMIN — TRIAMCINOLONE ACETONIDE 40 MG: 40 INJECTION, SUSPENSION INTRA-ARTICULAR; INTRAMUSCULAR at 17:30

## 2023-11-21 RX ADMIN — BUPIVACAINE HYDROCHLORIDE 2 ML: 2.5 INJECTION, SOLUTION INFILTRATION; PERINEURAL at 17:30

## 2023-11-30 ENCOUNTER — TELEPHONE (OUTPATIENT)
Age: 53
End: 2023-11-30

## 2023-11-30 NOTE — TELEPHONE ENCOUNTER
Caller: patient   Doctor: Kashif    Reason for call: patient needs PT for her l hip,  current script says L knee    Does she have to continue with PT for knee as well?     Call back#: [phone]  please let patient know when this is ready .

## 2023-12-01 DIAGNOSIS — M70.62 GREATER TROCHANTERIC BURSITIS OF LEFT HIP: Primary | ICD-10-CM

## 2023-12-05 ENCOUNTER — PATIENT MESSAGE (OUTPATIENT)
Dept: OBGYN CLINIC | Facility: MEDICAL CENTER | Age: 53
End: 2023-12-05

## 2023-12-05 ENCOUNTER — TELEPHONE (OUTPATIENT)
Dept: OBGYN CLINIC | Facility: HOSPITAL | Age: 53
End: 2023-12-05

## 2023-12-05 NOTE — TELEPHONE ENCOUNTER
Caller: Negin    Doctor: Anderson Sorensen    Reason for call: Patient returning call regarding gel injections. Patient is undecided on getting the knee injections. Will call back if she decides to move forward.        Call back#: [phone]

## 2024-01-04 DIAGNOSIS — M17.12 PRIMARY OSTEOARTHRITIS OF LEFT KNEE: ICD-10-CM

## 2024-01-04 RX ORDER — NAPROXEN 500 MG/1
TABLET ORAL
Qty: 60 TABLET | Refills: 1 | Status: SHIPPED | OUTPATIENT
Start: 2024-01-04

## 2024-01-08 DIAGNOSIS — B00.1 COLD SORE: ICD-10-CM

## 2024-01-08 RX ORDER — VALACYCLOVIR HYDROCHLORIDE 1 G/1
1000 TABLET, FILM COATED ORAL 2 TIMES DAILY
Qty: 4 TABLET | Refills: 2 | Status: SHIPPED | OUTPATIENT
Start: 2024-01-08 | End: 2024-01-09

## 2024-01-10 ENCOUNTER — NURSE TRIAGE (OUTPATIENT)
Age: 54
End: 2024-01-10

## 2024-01-10 ENCOUNTER — TELEPHONE (OUTPATIENT)
Age: 54
End: 2024-01-10

## 2024-01-10 NOTE — TELEPHONE ENCOUNTER
"Patient reports that she has pain when she is moving her head and neck. Patient has a mild headache but pain becomes sharp on the R side from the top of head to her ear. Patient reports recent headaches that are brief but moderate to severe.  There are no visits for today but can we please book with Dr. Pedersen for 1/11/24 using one of his Same Day?   Reason for Disposition   Patient wants to be seen    Answer Assessment - Initial Assessment Questions  1. LOCATION: \"Where does it hurt?\"       R side  2. ONSET: \"When did the headache start?\" (Minutes, hours or days)       3 days ago  3. PATTERN: \"Does the pain come and go, or has it been constant since it started?\"      Initially intermittent   4. SEVERITY: \"How bad is the pain?\" and \"What does it keep you from doing?\"  (e.g., Scale 1-10; mild, moderate, or severe)    - MILD (1-3): doesn't interfere with normal activities     - MODERATE (4-7): interferes with normal activities or awakens from sleep     - SEVERE (8-10): excruciating pain, unable to do any normal activities         Moderate to severe  5. RECURRENT SYMPTOM: \"Have you ever had headaches before?\" If Yes, ask: \"When was the last time?\" and \"What happened that time?\"       yes  6. CAUSE: \"What do you think is causing the headache?\"      unsure  7. MIGRAINE: \"Have you been diagnosed with migraine headaches?\" If Yes, ask: \"Is this headache similar?\"       N/A  8. HEAD INJURY: \"Has there been any recent injury to the head?\"       no  9. OTHER SYMPTOMS: \"Do you have any other symptoms?\" (fever, stiff neck, eye pain, sore throat, cold symptoms)      denies  10. PREGNANCY: \"Is there any chance you are pregnant?\" \"When was your last menstrual period?\"        N/A    Protocols used: Headache-ADULT-[place]    "

## 2024-01-10 NOTE — TELEPHONE ENCOUNTER
Regarding: Head/neck pain  ----- Message from Zully Gomez sent at 1/10/2024  8:53 AM EST -----  Patient has ongoing head pain that is waking her up. It starts at the top of her head and continues down to her ear. This was last addressed in appt with PCP in July, and she is seeing a vascular specialist in March to look at her neck and carotid. I was able to schedule her physical with Dr. Pelayo for 1/23 but if possible for patient to be seen before this, please advise for earlier appointment. She is not available today or tomorrow due to work.

## 2024-01-10 NOTE — TELEPHONE ENCOUNTER
Patient called to report head pain continues and she would like to be seen before her vascular specialist appt in March. Patient was scheduled for physical at earliest availability, but message was also sent to clinical pod to further address patient concerns and find earlier appt if possible.

## 2024-01-11 NOTE — TELEPHONE ENCOUNTER
Ambulatory to wheelchair with steady gait.  No new distress and no further questions or concerns.  Expressed understanding of discharge information and medications. Given driving warning.    Pt returning Tati's phone call regarding scheduling an appt for today 1/11. At the time of this call there was no availability. Called office clerical to advise. Per Office clerical no availability and unable to schedule appt. I advised patient that unfortunately we are unable to schedule for today and no availability tomorrow. Advised patient to keep her appt for 1/15 with Dr. Pedersen, and advised if symptoms get worse to go to Urgent Care or ER. Patient got upset that there was no availability today after being told she could get an appt.

## 2024-01-12 ENCOUNTER — OFFICE VISIT (OUTPATIENT)
Dept: FAMILY MEDICINE CLINIC | Facility: CLINIC | Age: 54
End: 2024-01-12
Payer: COMMERCIAL

## 2024-01-12 VITALS
DIASTOLIC BLOOD PRESSURE: 74 MMHG | TEMPERATURE: 97.5 F | HEIGHT: 66 IN | OXYGEN SATURATION: 96 % | HEART RATE: 88 BPM | WEIGHT: 191.6 LBS | BODY MASS INDEX: 30.79 KG/M2 | SYSTOLIC BLOOD PRESSURE: 122 MMHG

## 2024-01-12 DIAGNOSIS — M99.01 CERVICAL SOMATIC DYSFUNCTION: Primary | ICD-10-CM

## 2024-01-12 DIAGNOSIS — R51.9 NONINTRACTABLE HEADACHE, UNSPECIFIED CHRONICITY PATTERN, UNSPECIFIED HEADACHE TYPE: ICD-10-CM

## 2024-01-12 DIAGNOSIS — C73 PAPILLARY CARCINOMA OF THYROID (HCC): ICD-10-CM

## 2024-01-12 PROCEDURE — 99213 OFFICE O/P EST LOW 20 MIN: CPT | Performed by: FAMILY MEDICINE

## 2024-01-12 RX ORDER — METHOCARBAMOL 750 MG/1
TABLET, FILM COATED ORAL
Qty: 10 TABLET | Refills: 0 | Status: SHIPPED | OUTPATIENT
Start: 2024-01-12 | End: 2024-01-23 | Stop reason: ALTCHOICE

## 2024-01-19 NOTE — PROGRESS NOTES
Assessment/Plan: Treat as somatic dysfunction of the neck.  Range of motion exercises reviewed.  Start Robaxin.  Recommend heat.  Reviewed that if rash breaks out?  Shingles.  Does have cold sore right lower lip. ER with any severe worsening.  Unrelated had a CTA of the head and neck back in March.  Negative.  Will update us if not    No problem-specific Assessment & Plan notes found for this encounter.       Diagnoses and all orders for this visit:    Cervical somatic dysfunction  -     methocarbamol (Robaxin-750) 750 mg tablet; Take 1 tab at bedtime as needed for neck stiffness    Papillary carcinoma of thyroid (HCC)        1. Cervical somatic dysfunction  methocarbamol (Robaxin-750) 750 mg tablet      2. Papillary carcinoma of thyroid (HCC)            Subjective:        Patient ID: Negin Tapia is a 53 y.o. female.  Chief Complaint   Patient presents with    Headache     Saturday sharp pain on the top of her head , happened periodically on Sunday , Monday ,trouble washing her head as well sensitive to the touch , Wednesday the pain moved toward her ear ,today only on her right side and the pain isnt as sharp       Patient with increased sensitivity right side of face and neck area.  Not sure if she strained something or not.  Broke out with cold sore on right lower lip.  No other rash.  No visual changes.    Headache      The following portions of the patient's history were reviewed and updated as appropriate: past medical history, past surgical history and problem list.      Review of Systems   Constitutional:  Negative for chills, fatigue and fever.   HENT:  Negative for congestion, dental problem, ear pain, facial swelling, hearing loss, sinus pressure, sinus pain and sore throat.    Eyes:  Negative for visual disturbance.   Respiratory:  Negative for cough and shortness of breath.    Cardiovascular:  Negative for chest pain, palpitations and leg swelling.   Gastrointestinal:  Negative for abdominal  "pain.   Musculoskeletal:  Positive for neck stiffness.   Skin:  Negative for rash.   Neurological:  Positive for headaches. Negative for dizziness.   Psychiatric/Behavioral:  The patient is not nervous/anxious.          Objective:  /74   Pulse 88   Temp 97.5 °F (36.4 °C) (Temporal)   Ht 5' 6\" (1.676 m)   Wt 86.9 kg (191 lb 9.6 oz)   SpO2 96%   BMI 30.93 kg/m²        Physical Exam    "

## 2024-01-23 ENCOUNTER — OFFICE VISIT (OUTPATIENT)
Dept: FAMILY MEDICINE CLINIC | Facility: CLINIC | Age: 54
End: 2024-01-23
Payer: COMMERCIAL

## 2024-01-23 VITALS
TEMPERATURE: 97.2 F | HEIGHT: 66 IN | HEART RATE: 103 BPM | OXYGEN SATURATION: 98 % | BODY MASS INDEX: 30.53 KG/M2 | DIASTOLIC BLOOD PRESSURE: 74 MMHG | WEIGHT: 190 LBS | SYSTOLIC BLOOD PRESSURE: 110 MMHG

## 2024-01-23 DIAGNOSIS — E66.09 CLASS 1 OBESITY DUE TO EXCESS CALORIES WITH SERIOUS COMORBIDITY AND BODY MASS INDEX (BMI) OF 30.0 TO 30.9 IN ADULT: ICD-10-CM

## 2024-01-23 DIAGNOSIS — R73.01 ABNORMAL FASTING GLUCOSE: ICD-10-CM

## 2024-01-23 DIAGNOSIS — E89.0 POSTOPERATIVE HYPOTHYROIDISM: ICD-10-CM

## 2024-01-23 DIAGNOSIS — C73 PAPILLARY CARCINOMA OF THYROID (HCC): ICD-10-CM

## 2024-01-23 DIAGNOSIS — R13.11 ORAL PHASE DYSPHAGIA: ICD-10-CM

## 2024-01-23 DIAGNOSIS — E55.9 VITAMIN D DEFICIENCY: ICD-10-CM

## 2024-01-23 DIAGNOSIS — I77.3 FIBROMUSCULAR DYSPLASIA OF BOTH CAROTID ARTERIES (HCC): ICD-10-CM

## 2024-01-23 DIAGNOSIS — E78.2 MIXED HYPERLIPIDEMIA: Primary | ICD-10-CM

## 2024-01-23 DIAGNOSIS — Z11.59 ENCOUNTER FOR HEPATITIS C SCREENING TEST FOR LOW RISK PATIENT: ICD-10-CM

## 2024-01-23 DIAGNOSIS — Z00.00 ANNUAL PHYSICAL EXAM: ICD-10-CM

## 2024-01-23 PROBLEM — R07.2 PRECORDIAL CHEST PAIN: Status: RESOLVED | Noted: 2023-05-18 | Resolved: 2024-01-23

## 2024-01-23 PROBLEM — E66.811 CLASS 1 OBESITY DUE TO EXCESS CALORIES WITH SERIOUS COMORBIDITY AND BODY MASS INDEX (BMI) OF 30.0 TO 30.9 IN ADULT: Status: ACTIVE | Noted: 2024-01-23

## 2024-01-23 PROBLEM — M23.92 LOCKING OF LEFT KNEE: Status: RESOLVED | Noted: 2023-07-31 | Resolved: 2024-01-23

## 2024-01-23 PROCEDURE — 99396 PREV VISIT EST AGE 40-64: CPT | Performed by: FAMILY MEDICINE

## 2024-01-23 PROCEDURE — 99214 OFFICE O/P EST MOD 30 MIN: CPT | Performed by: FAMILY MEDICINE

## 2024-01-23 NOTE — ASSESSMENT & PLAN NOTE
Patient is up to date on screening breast cancer and colon cancer Patient will check with GYN to see if cervix was removed with hysterectomy - if it was no need for pap smear Patient was givne information on advanced directives and living will Patient to increase exercise watchd diet and see eye doctor and dentist

## 2024-01-23 NOTE — PROGRESS NOTES
ADULT ANNUAL PHYSICAL  [facility]    NAME: Negin Tapia  AGE: 53 y.o. SEX: female  : 1970     DATE: 2024     Assessment and Plan:     Problem List Items Addressed This Visit          Digestive    Oral phase dysphagia     New issues with swallowing I will refer to GI         Relevant Orders    Ambulatory Referral to Gastroenterology       Endocrine    Papillary carcinoma of thyroid (HCC)     Continue followup with dr bennett         Postoperative hypothyroidism     Due to thyroid cancer Pateint to see dr bennett and have labs done as scheduled continue with thyroid medicaiton            Cardiovascular and Mediastinum    Fibromuscular dysplasia of both carotid arteries (HCC)     Check ultrasound also and see vascular            Other    Abnormal fasting glucose     Check A1c refer to nutritionist         Relevant Orders    Comprehensive metabolic panel    Hemoglobin A1C    Lipid panel    Ambulatory Referral to Nutrition Services    Vitamin D deficiency     Continue with supplement check labs every 6 weeks          Relevant Orders    Vitamin D 25 hydroxy    Mixed hyperlipidemia - Primary     Reviewd lipids from last time as well as the family history ASCVD score and also the fibromuscular dysplasia of carotids I feel her LDL needs to be < 100 may need to consider statin if not improved check labs          Relevant Orders    Comprehensive metabolic panel    Lipid panel    Ambulatory Referral to Nutrition Services    Annual physical exam     Patient is up to date on screening breast cancer and colon cancer Patient will check with GYN to see if cervix was removed with hysterectomy - if it was no need for pap smear Patient was givne information on advanced directives and living will Patient to increase exercise watchd diet and see eye doctor and dentist          Encounter for hepatitis C screening test for low risk patient     Check labs          Relevant Orders    Hepatitis C antibody    Class 1 obesity due to excess calories with serious comorbidity and body mass index (BMI) of 30.0 to 30.9 in adult     Refer to nutritionist and discussed diet Patient to try to increase activity She has some issues with that due to her arthritis issues          Relevant Orders    Ambulatory Referral to Nutrition Services       Immunizations and preventive care screenings were discussed with patient today. Appropriate education was printed on patient's after visit summary.    Counseling:  Alcohol/drug use: discussed moderation in alcohol intake, the recommendations for healthy alcohol use, and avoidance of illicit drug use.  Dental Health: discussed importance of regular tooth brushing, flossing, and dental visits.  Injury prevention: discussed safety/seat belts, safety helmets, smoke detectors, carbon dioxide detectors, and smoking near bedding or upholstery.  Sexual health: discussed sexually transmitted diseases, partner selection, use of condoms, avoidance of unintended pregnancy, and contraceptive alternatives.  Exercise: the importance of regular exercise/physical activity was discussed. Recommend exercise 3-5 times per week for at least 30 minutes.          No follow-ups on file.     Chief Complaint:     Chief Complaint   Patient presents with    Physical Exam     Discuss ongoing concerns about neck and head pain       History of Present Illness:     Adult Annual Physical   Patient here for a comprehensive physical exam. The patient reports problems - neck pain has resolved Paient has hypothyroidism due to thyroid cancer Patient sees dr Castro Patient also s calvins issues with fasting glucose and cholesterol Patient has vitamin D deficiency she takes supplement  .She has had issues with her neck and head and the muscle relaxant and exrcises are helping Patient new concern is swallowing issues She has had several times with food getting stuck sometimes bread/pasta  sometimes pills She has not had this looked at   Patient is also worried about weight She has followup US of carotids scheduled for followup of the fibromuscular dysplasia of carotids   Diet and Physical Activity  Diet/Nutrition: low carb diet and consuming 3-5 servings of fruits/vegetables daily.   Exercise: no formal exercise and in physical therapy for arthritis  .      Depression Screening  PHQ-2/9 Depression Screening    Little interest or pleasure in doing things: 0 - not at all  Feeling down, depressed, or hopeless: 0 - not at all  PHQ-2 Score: 0  PHQ-2 Interpretation: Negative depression screen       General Health  Sleep: sleeps well and sleeps 6-8 hrs .   Hearing: normal - bilateral.  Vision: vision problems: having some focus issues she is due for eye exam , most recent eye exam >1 year ago, and wears contacts.   Dental: regular dental visits and brushes teeth twice daily.       /GYN Health  Follows with gynecology? no   Patient is: postmenopausal had a hysterectomy unsure is cervix was taken   Last menstrual period: 2008  Contraceptive method: menopause.    Advanced Care Planning  Do you have an advanced directive? no  Do you have a durable medical power of ? no     Review of Systems:     Review of Systems   Constitutional:  Negative for fatigue, fever and unexpected weight change.   HENT:  Positive for trouble swallowing. Negative for congestion, sinus pressure, sinus pain, sore throat and voice change.    Eyes:  Negative for discharge and visual disturbance.   Respiratory:  Negative for cough, chest tightness, shortness of breath and wheezing.    Cardiovascular:  Negative for chest pain, palpitations and leg swelling.   Gastrointestinal:  Negative for abdominal pain, blood in stool, constipation, diarrhea, nausea and vomiting.   Genitourinary:  Negative for difficulty urinating, dysuria, frequency and hematuria.   Musculoskeletal:  Positive for arthralgias, back pain, neck pain and neck  stiffness. Negative for gait problem and joint swelling.        Back knee and hip DJD pain on and off    Skin:  Negative for wound.   Allergic/Immunologic: Negative for environmental allergies and food allergies.   Neurological:  Negative for dizziness, syncope, weakness, numbness and headaches.   Hematological:  Negative for adenopathy. Does not bruise/bleed easily.   Psychiatric/Behavioral:  Negative for confusion, decreased concentration and sleep disturbance. The patient is not nervous/anxious.       Past Medical History:     Past Medical History:   Diagnosis Date    Cervical neuritis 05/16/2013    C7- T1 Left, Last assessed    Endometriosis, cervix     GERD (gastroesophageal reflux disease)     Herpes     Hiatal hernia     R INGUINAL    Hypothyroidism     Precordial chest pain     Thyroid cancer (HCC)     THYROID      Past Surgical History:     Past Surgical History:   Procedure Laterality Date    CHOLECYSTECTOMY  05/2020    COLONOSCOPY  12/2015 2015  Anastasiya. Normal exam, hemorrhoids.    COLONOSCOPY  2010    Dr. Langford.  Normal exam, hemorrhoids.    COLONOSCOPY  1996    Dr. Gonzalez.  Normal exam    HERNIA REPAIR      HYSTERECTOMY  2005    has ovaries    DC LAPAROSCOPY SURG CHOLECYSTECTOMY N/A 05/23/2019    Procedure: LAP. ZANE., IOCG;  Surgeon: Maya Harrison MD;  Location: [facility];  Service: General    THYROIDECTOMY      early 2008 [facility]    TUBAL LIGATION        Social History:     Social History     Socioeconomic History    Marital status: /Civil Union     Spouse name: None    Number of children: None    Years of education: None    Highest education level: None   Occupational History    Occupation:    Tobacco Use    Smoking status: Never    Smokeless tobacco: Never   Vaping Use    Vaping status: Never Used   Substance and Sexual Activity    Alcohol use: Not Currently     Comment: socially    Drug use: No    Sexual activity: Not Currently   Other Topics Concern    None   Social  History Narrative    None     Social Determinants of Health     Financial Resource Strain: Not on file   Food Insecurity: Not on file   Transportation Needs: Not on file   Physical Activity: Not on file   Stress: Not on file   Social Connections: Not on file   Intimate Partner Violence: Not on file   Housing Stability: Not on file      Family History:     Family History   Problem Relation Age of Onset    Thyroid disease Mother     Cancer Father     Colon polyps Father     Arthritis Sister     Thyroid disease Sister     Thyroid disease Brother     Glaucoma Brother     Coronary artery disease Maternal Grandmother     Diabetes Maternal Grandmother     No Known Problems Maternal Grandfather     No Known Problems Paternal Grandmother     No Known Problems Paternal Grandfather       Current Medications:     Current Outpatient Medications   Medication Sig Dispense Refill    Ascorbic Acid (VITAMIN C GUMMIE PO) Take by mouth daily      aspirin 81 mg chewable tablet Chew 1 tablet (81 mg total) daily      Calcium Citrate 200 MG TABS One tablet daily      cholecalciferol (VITAMIN D3) 1,000 units tablet Take 2,000 Units by mouth daily       Flaxseed Oil OIL Take 1 capsule by mouth daily       Glucos-Chondroit-Collag-Hyal (GLUCOSAMINE CHONDROIT-COLLAGEN PO)       levothyroxine 88 mcg tablet 100 mcg      Lysine 1000 MG TABS Take 1,000 mg by mouth daily       Magnesium 400 MG TABS Take 1 tablet by mouth daily       Multiple Vitamins-Minerals (ZINC PO) Take by mouth      omega-3-acid ethyl esters (LOVAZA) 1 g capsule Take 2 g by mouth 2 (two) times a day      Turmeric 500 MG CAPS Take 1 tablet by mouth daily       valACYclovir (VALTREX) 1,000 mg tablet Take 1 tablet (1,000 mg total) by mouth 2 (two) times a day for 1 day 4 tablet 2     No current facility-administered medications for this visit.      Allergies:     Allergies   Allergen Reactions    Bee Venom Anaphylaxis    Dust Mite Extract     Gabapentin Dizziness     Annotation -  "[id number]: \"felt like out of body experience\"    Molds & Smuts Sneezing    Other      Annotation - [id number]: seasonal, Trees    Pollen Extract     Bernard Grass Pollen Allergen     Diclofenac Rash     tolerates naproxen    Latex Rash      Physical Exam:     /74 (BP Location: Left arm, Patient Position: Sitting, Cuff Size: Adult)   Pulse 103   Temp (!) 97.2 °F (36.2 °C) (Temporal)   Ht 5' 6\" (1.676 m)   Wt 86.2 kg (190 lb)   SpO2 98%   BMI 30.67 kg/m²     Physical Exam  Vitals and nursing note reviewed.   Constitutional:       Appearance: She is well-developed. She is obese.   HENT:      Head: Normocephalic and atraumatic.      Right Ear: Tympanic membrane and external ear normal.      Left Ear: Tympanic membrane and external ear normal.      Nose: Nose normal.      Mouth/Throat:      Pharynx: No oropharyngeal exudate.   Eyes:      Extraocular Movements: Extraocular movements intact.      Conjunctiva/sclera: Conjunctivae normal.      Pupils: Pupils are equal, round, and reactive to light.   Neck:      Thyroid: No thyromegaly.      Trachea: No tracheal deviation.   Cardiovascular:      Rate and Rhythm: Normal rate and regular rhythm.      Heart sounds: Normal heart sounds.   Pulmonary:      Effort: Pulmonary effort is normal. No respiratory distress.      Breath sounds: Normal breath sounds. No wheezing or rales.   Abdominal:      General: Bowel sounds are normal.      Palpations: Abdomen is soft.   Musculoskeletal:         General: Normal range of motion.      Cervical back: Normal range of motion and neck supple.   Lymphadenopathy:      Cervical: No cervical adenopathy.   Skin:     General: Skin is warm.      Findings: No rash.   Neurological:      General: No focal deficit present.      Mental Status: She is alert and oriented to person, place, and time.      Cranial Nerves: No cranial nerve deficit.      Motor: No abnormal muscle tone.   Psychiatric:         Mood and Affect: Mood normal.         " Behavior: Behavior normal.         Thought Content: Thought content normal.         Judgment: Judgment normal.          Erica Pelayo DO  [facility]

## 2024-01-23 NOTE — ASSESSMENT & PLAN NOTE
Due to thyroid cancer Pateint to see dr bennett and have labs done as scheduled continue with thyroid medicaiton

## 2024-01-23 NOTE — ASSESSMENT & PLAN NOTE
Reviewd lipids from last time as well as the family history ASCVD score and also the fibromuscular dysplasia of carotids I feel her LDL needs to be < 100 may need to consider statin if not improved check labs

## 2024-01-23 NOTE — ASSESSMENT & PLAN NOTE
Refer to nutritionist and discussed diet Patient to try to increase activity She has some issues with that due to her arthritis issues

## 2024-02-19 ENCOUNTER — CLINICAL SUPPORT (OUTPATIENT)
Dept: NUTRITION | Facility: HOSPITAL | Age: 54
End: 2024-02-19
Payer: COMMERCIAL

## 2024-02-19 ENCOUNTER — APPOINTMENT (OUTPATIENT)
Dept: LAB | Age: 54
End: 2024-02-19
Payer: COMMERCIAL

## 2024-02-19 VITALS — WEIGHT: 186.2 LBS | BODY MASS INDEX: 30.05 KG/M2

## 2024-02-19 DIAGNOSIS — R73.01 ABNORMAL FASTING GLUCOSE: ICD-10-CM

## 2024-02-19 DIAGNOSIS — R73.01 IMPAIRED FASTING GLUCOSE: Primary | ICD-10-CM

## 2024-02-19 DIAGNOSIS — E55.9 VITAMIN D DEFICIENCY: ICD-10-CM

## 2024-02-19 DIAGNOSIS — E78.00 PURE HYPERCHOLESTEROLEMIA: ICD-10-CM

## 2024-02-19 DIAGNOSIS — E78.2 MIXED HYPERLIPIDEMIA: ICD-10-CM

## 2024-02-19 DIAGNOSIS — Z11.59 ENCOUNTER FOR HEPATITIS C SCREENING TEST FOR LOW RISK PATIENT: ICD-10-CM

## 2024-02-19 DIAGNOSIS — E66.09 CLASS 1 OBESITY DUE TO EXCESS CALORIES WITH SERIOUS COMORBIDITY AND BODY MASS INDEX (BMI) OF 30.0 TO 30.9 IN ADULT: ICD-10-CM

## 2024-02-19 LAB
25(OH)D3 SERPL-MCNC: 22.7 NG/ML (ref 30–100)
ALBUMIN SERPL BCP-MCNC: 4.3 G/DL (ref 3.5–5)
ALP SERPL-CCNC: 65 U/L (ref 34–104)
ALT SERPL W P-5'-P-CCNC: 25 U/L (ref 7–52)
ANION GAP SERPL CALCULATED.3IONS-SCNC: 9 MMOL/L
AST SERPL W P-5'-P-CCNC: 20 U/L (ref 13–39)
BILIRUB DIRECT SERPL-MCNC: 0.16 MG/DL (ref 0–0.2)
BILIRUB SERPL-MCNC: 0.54 MG/DL (ref 0.2–1)
BUN SERPL-MCNC: 11 MG/DL (ref 5–25)
CALCIUM SERPL-MCNC: 9.4 MG/DL (ref 8.4–10.2)
CHLORIDE SERPL-SCNC: 107 MMOL/L (ref 96–108)
CHOLEST SERPL-MCNC: 198 MG/DL
CO2 SERPL-SCNC: 27 MMOL/L (ref 21–32)
CREAT SERPL-MCNC: 0.75 MG/DL (ref 0.6–1.3)
EST. AVERAGE GLUCOSE BLD GHB EST-MCNC: 114 MG/DL
GFR SERPL CREATININE-BSD FRML MDRD: 90 ML/MIN/1.73SQ M
GLUCOSE P FAST SERPL-MCNC: 89 MG/DL (ref 65–99)
HBA1C MFR BLD: 5.6 %
HCV AB SER QL: NORMAL
HDLC SERPL-MCNC: 42 MG/DL
LDLC SERPL CALC-MCNC: 135 MG/DL (ref 0–100)
NONHDLC SERPL-MCNC: 156 MG/DL
POTASSIUM SERPL-SCNC: 4.3 MMOL/L (ref 3.5–5.3)
PROT SERPL-MCNC: 6.8 G/DL (ref 6.4–8.4)
SODIUM SERPL-SCNC: 143 MMOL/L (ref 135–147)
TRIGL SERPL-MCNC: 106 MG/DL

## 2024-02-19 PROCEDURE — 36415 COLL VENOUS BLD VENIPUNCTURE: CPT

## 2024-02-19 PROCEDURE — 97802 MEDICAL NUTRITION INDIV IN: CPT | Performed by: DIETITIAN, REGISTERED

## 2024-02-19 PROCEDURE — 80061 LIPID PANEL: CPT

## 2024-02-19 PROCEDURE — 83036 HEMOGLOBIN GLYCOSYLATED A1C: CPT

## 2024-02-19 PROCEDURE — 80053 COMPREHEN METABOLIC PANEL: CPT

## 2024-02-19 PROCEDURE — 82306 VITAMIN D 25 HYDROXY: CPT

## 2024-02-19 PROCEDURE — 82248 BILIRUBIN DIRECT: CPT

## 2024-02-19 PROCEDURE — 86803 HEPATITIS C AB TEST: CPT

## 2024-02-19 NOTE — PROGRESS NOTES
" Nutrition Assessment Form    Patient Name: Negin Tapia    YOB: 1970    Sex: Female     Assessment Date: 2/19/2024  Start Time: 9 Stop Time: 10 Total Minutes: 60     Data:  Present at session: self   Parent/Patient Concerns/reason for visit: \"They said my cholesterol was really really high and diabetes run in my family\"   Medical Dx/Reason for Referral: IFG obesity HLD   Past Medical History:   Diagnosis Date    Cervical neuritis 05/16/2013    C7- T1 Left, Last assessed    Endometriosis, cervix     GERD (gastroesophageal reflux disease)     Herpes     Hiatal hernia     R INGUINAL    Hypothyroidism     Precordial chest pain     Thyroid cancer (HCC)     THYROID       Current Outpatient Medications   Medication Sig Dispense Refill    Ascorbic Acid (VITAMIN C GUMMIE PO) Take by mouth daily      aspirin 81 mg chewable tablet Chew 1 tablet (81 mg total) daily      Calcium Citrate 200 MG TABS One tablet daily      cholecalciferol (VITAMIN D3) 1,000 units tablet Take 2,000 Units by mouth daily       Flaxseed Oil OIL Take 1 capsule by mouth daily       Glucos-Chondroit-Collag-Hyal (GLUCOSAMINE CHONDROIT-COLLAGEN PO)       levothyroxine 88 mcg tablet 100 mcg      Lysine 1000 MG TABS Take 1,000 mg by mouth daily       Magnesium 400 MG TABS Take 1 tablet by mouth daily       Multiple Vitamins-Minerals (ZINC PO) Take by mouth      omega-3-acid ethyl esters (LOVAZA) 1 g capsule Take 2 g by mouth 2 (two) times a day      Turmeric 500 MG CAPS Take 1 tablet by mouth daily       valACYclovir (VALTREX) 1,000 mg tablet Take 1 tablet (1,000 mg total) by mouth 2 (two) times a day for 1 day 4 tablet 2     No current facility-administered medications for this visit.        Additional Meds/Supplements: N/a   Special Learning Needs/barriers to learning/any new barriers N/a   Height: HC Readings from Last 5 Encounters:   No data found for HC      Weight: Wt Readings from Last 10 Encounters:   01/23/24 86.2 kg (190 lb) " "  01/12/24 86.9 kg (191 lb 9.6 oz)   11/21/23 86.8 kg (191 lb 6.4 oz)   11/09/23 86.4 kg (190 lb 6.4 oz)   08/18/23 85.2 kg (187 lb 12.8 oz)   08/17/23 85.3 kg (188 lb)   08/03/23 87.5 kg (192 lb 12.8 oz)   07/31/23 86.1 kg (189 lb 12.8 oz)   07/11/23 87.5 kg (192 lb 12.8 oz)   07/03/23 87.5 kg (193 lb)     Estimated body mass index is 30.67 kg/m² as calculated from the following:    Height as of 1/23/24: 5' 6\" (1.676 m).    Weight as of 1/23/24: 86.2 kg (190 lb).   Recent Weight Change: [x]Yes     []No  Amount: 4-5# loss desired and intentional      Energy Needs: 1655cals (25cals/kg - 500 for 1#/wk wt loss)   Allergies   Allergen Reactions    Bee Venom Anaphylaxis    Dust Mite Extract     Gabapentin Dizziness     Annotation - 22Jan2015: \"felt like out of body experience\"    Molds & Smuts Sneezing    Other      Annotation - 30Jun2013: seasonal, Trees    Pollen Extract     Bernard Grass Pollen Allergen     Diclofenac Rash     tolerates naproxen    Latex Rash    or intolerances NKFA   Social History     Substance and Sexual Activity   Alcohol Use Not Currently    Comment: socially    Wine 2-3x/year   Social History     Tobacco Use   Smoking Status Never   Smokeless Tobacco Never       Who shops? patient   Who cooks/cooking methods/Eating out/take out habits   patient  Cooking methods: bake/betancur/air betancur/grill/boil/other________    Out to eat 3-4x/wk   Exercise:  PT now for hip bursitis and now knees-  3x/wk (since Jan) now up to 4x/wk      Other: ie: Sleep habits/ stress level/ work habits household-lives with ?/ food security Bed at 830-9- asleep by 930-10 and awake at 130/3-4 waking up for good around 5-515 (alarm or no alarm) - exhausted with 5-6 hours-if wakes up a few times feels better-   Energy fluctuates daily-  Stress level - 4/10   at [employer]- working 40 hours  Lives with  and dgt (26yo)   Prior Nutritional Counseling? []Yes     [x]No  When:      Why:         Diet Hx:  hypoglycemic episodes " (sometimes- last one 2-3 weeks ago)- keeps raisins and protein bars ( quest- blueberry or peanut butter and chocolate x 20gms protein)  Breakfast:  drinking water  32oz container and will drink 2-3x/daily with lela- will also drink sleepy time tea in the morning with tsp honey- ice tea rarely- maybe 1x/wk if that Diet:  7-8-830  smoothie berries x1/2c  (1c in total)- yogurt- vanilla non fat yogurt x 2 TBSP- 2 oz cherry juice (20oz in total)   Never skips  Or will have 1 pkg oatmeal- apple cinnamon-    Lunch: 12 salad- peppers tomatoes, nuts- 4oz pkg almonds/pepitas/etc cucumbers, carrots, olives x 10  green - (low sodium) broccoli, sometimes tuna or chicken x2-3oz- dressing vinagraitte- low fat - 2 TBSP   May order s/w when order out   Dinner: 530-630 ( cooks) meat starch - (pretty hungry)- chicken enchiladas- sour cream/ cream of chicken soup- canned/ stroganoff/ pot roast     Snacks: AM - protein bar maybe 2x/wk  PM - n/a  HS - apple- or fruit or yogurt flip    Other Notes/ Initial Assessment:  Negin is here to learn to eat better and find out what is good for her and not good for her, she would also like to lose weight.  She does work and her  is disabled so they will go out sometimes if he is not able to cook.  Her current stress level is manageable, she works a normal job, sleep is an issue for her, feels like it could be trauma from previous job.  Discussed counseling as an option to help her deal with that.    Discussed importance of 3 meals daily, sufficient calories early in the day to prevent being overly hungry at night, adequate fluid, eliminating artificial sweeteners, eliminating lysine, Vit D with MD orders only, and potential for zinc to cause anemia, importance of adequate sleep, activity and calories in general and portions per plate method.     Provided 1500 norm sample menu, wt loss tips, healthy snack ideas, LDL cholesterol lowering therapy from [facility], and RD name and number for  home use.  Follow up made for April 3rd.    Updated assessment (Follow up note only):           Nutrition Diagnosis:   Altered Nutrition-Related Laboratory values  related to Kidney, liver, cardiac, endocrine, neurologic, and/or pulmonary dysfunction as evidenced by  Abnormal plasma glucose and/or HgbA1c levels       Any change or new dx since previous visit:     Nutrition Diagnosis:   Overweight/obesity  related to Excess energy intake as evidenced by  BMI more than normative standard for age and sex (obesity-grade I 30-34.9)      Medical Nutrition Therapy Intervention:  [x]Individualized Meal Plan-Discussed the importance of eating 3 meals daily and not skipping, and how metabolism is affected.  Also discussed adding in small snacks or 5-6 small meals daily if desired vs 3 larger ones, and appropriate options and portions.  Appropriate timing of meals, including eating within 1 hr of waking and eating meals slowly 20mins/meals, minimizing mindless/boredom or habitual eating, etc was also mentioned.  Discussed the plate method of portioning foods, including half a plate fruits and vegetables or a half plate all vegetables, 1/4 of the plate a lean protein source or meat, and a 1/4 of the plate being a whole grain carb- usually 1/2-1c.  This should be followed for at least 2 meals of the day, but could also be followed for all 3.  Fluid intake discussed and appropriate amounts and choices, 16 oz with meals and additional 32oz during the day.  S/S dehydration also covered. [x]Understanding Lab Values- discussed Vit D cholesterol and HDL   []Basic Pathophysiology of Disease [x]Food/Medication Interactions- discussed stopping lysine and zinc, checking Vit D before continuing to supplements   []Food Diary [x]Exercise-150 mins of moderate activity weekly, discussed importance of variety of activity, including wt bearing activities 2-3x/wk x 10-15mins. Discussed importance of activity throughout the lifecycle and its  impact on overall health/stress management, etc.   [x]Lifestyle/Behavior Modification Techniques-Discussed the importance of adequate sleep, and ideal sleeping conditions, including a cool temperature 64-68 degrees F, and a dark and quiet room. Also discussed the importance of a regular and consistent sleep routine, including minimizing blue light exposure an hour before sleeping.  Weekend habits should include staying fairly consistent with weekday sleep habits to minimize disruption during the week.  A connection was made between getting adequate and good quality sleep and the ability to handle stress the next day, make healthy food choices, and be active. []Medication, Mechanism of Action   []Label Reading: CHO/ Na/ Fat/ other_________ []Self Blood Glucose Monitoring   [x]Weight/BMI Goals: gain/lose/maintain-Short term goal of 2-4# x 1 month or next visit-goal wt 165 []Other -           Comprehension: []Excellent  []Very Good  [x]Good  []Fair   []Poor    Receptivity: []Excellent  []Very Good  [x]Good  []Fair   []Poor    Expected Compliance: []Excellent  []Very Good  [x]Good  []Fair   []Poor        Goals (initial)/ Progress made on previous goals/new goals:   3 meals daily no skipping - 1500-1800cals   2.  Portions per plate method as discussed with RD   3.    Eliminate zinc and lysine       No follow-ups on file.  Labs:  CMP  Lab Results   Component Value Date     05/03/2015    K 4.0 05/20/2023     (H) 05/20/2023    CO2 28 05/20/2023    ANIONGAP 8 05/03/2015    BUN 12 05/20/2023    CREATININE 0.75 05/20/2023    GLUCOSE 92 05/03/2015    GLUF 95 05/20/2023    CALCIUM 9.1 05/20/2023    AST 40 05/20/2023    ALT 91 (H) 05/20/2023    ALKPHOS 84 05/20/2023    PROT 6.9 05/03/2015    BILITOT 0.6 05/03/2015    EGFR 90 05/20/2023       BMP  Lab Results   Component Value Date    GLUCOSE 92 05/03/2015    CALCIUM 9.1 05/20/2023     05/03/2015    K 4.0 05/20/2023    CO2 28 05/20/2023     (H) 05/20/2023  "   BUN 12 05/20/2023    CREATININE 0.75 05/20/2023       Lipids  No results found for: \"CHOL\"  Lab Results   Component Value Date    HDL 41 (L) 05/20/2023    HDL 42 10/02/2021    HDL 41 03/07/2020     Lab Results   Component Value Date    LDLCALC 170 (H) 05/20/2023    LDLCALC 155 (H) 10/02/2021    LDLCALC 135 (H) 03/07/2020     Lab Results   Component Value Date    TRIG 135 05/20/2023    TRIG 88 10/02/2021    TRIG 90 03/07/2020     No results found for: \"CHOLHDL\"    Hemoglobin A1C  Lab Results   Component Value Date    HGBA1C 5.4 10/01/2022       Fasting Glucose  Lab Results   Component Value Date    GLUF 95 05/20/2023       Insulin     Thyroid  No results found for: \"TSH\", \"J8GPXKV\", \"L9NZMJM\", \"THYROIDAB\"    Hepatic Function Panel  Lab Results   Component Value Date    ALT 91 (H) 05/20/2023    AST 40 05/20/2023    ALKPHOS 84 05/20/2023    BILITOT 0.6 05/03/2015       Celiac Disease Antibody Panel  No results found for: \"ENDOMYSIAL IGA\", \"GLIADIN IGA\", \"GLIADIN IGG\", \"IGA\", \"TISSUE TRANSGLUT AB\", \"TTG IGA\"   Iron  Lab Results   Component Value Date    FERRITIN 297 12/30/2019            Reginald Chen RD  [facility] CLINICAL NUTRITION SERVICES  [address] [facility] [id number]    "

## 2024-02-20 NOTE — PROGRESS NOTES
"Assessment/Plan:    Recommended monthly SBE, annual CBE and annual screening mammo.  Colonoscopy noted to be up to date. Reviewed diet/activity recommendations Calcium 1200 mg and Vit D 600-1000 IU daily.  Discussed postmenopausal considerations and symptoms to report. Kegel exercises as instructed. RTO in one year for routine annual gyn exam or sooner PRN.        Diagnoses and all orders for this visit:    Encounter for gynecological examination (general) (routine) without abnormal findings        Subjective:      Patient ID: Negin Tapia is a 53 y.o. female.    This new, returning patient presents for routine annual gyn exam.  History of hysterectomy for pain per pt in the early 2000s.  History of thyroidectomy   She denies vaginal bleeding, VM sx, pelvic pain, breast concerns, abnormal discharge, bowel/bladder dysfunction, depression/anx.   , not sexually active.  is disabled.   Mammography up to date and normal, 9/28/23. Colonoscopy up to date, 12/27/22.            The following portions of the patient's history were reviewed and updated as appropriate: allergies, current medications, past family history, past medical history, past social history, past surgical history and problem list.    Review of Systems   Constitutional: Negative.    Respiratory: Negative.     Cardiovascular: Negative.    Gastrointestinal: Negative.    Endocrine: Negative.    Genitourinary:  Negative for dysuria, frequency, pelvic pain, urgency, vaginal bleeding, vaginal discharge and vaginal pain.   Musculoskeletal: Negative.    Skin: Negative.    Neurological: Negative.    Psychiatric/Behavioral: Negative.           Objective:      /70   Pulse 91   Ht 5' 4.5\" (1.638 m)   Wt 84.9 kg (187 lb 3.2 oz)   BMI 31.64 kg/m²          Physical Exam  Vitals and nursing note reviewed. Exam conducted with a chaperone present.   Constitutional:       Appearance: Normal appearance. She is well-developed.   HENT:      Head: " Normocephalic and atraumatic.   Neck:      Comments: thyroidectomy  Cardiovascular:      Rate and Rhythm: Normal rate and regular rhythm.      Heart sounds: Normal heart sounds.   Pulmonary:      Effort: Pulmonary effort is normal.      Breath sounds: Normal breath sounds.   Chest:   Breasts:     Breasts are symmetrical.      Right: No inverted nipple, mass, nipple discharge, skin change or tenderness.      Left: No inverted nipple, mass, nipple discharge, skin change or tenderness.   Abdominal:      General: Bowel sounds are normal.      Palpations: Abdomen is soft.      Tenderness: There is no abdominal tenderness.      Hernia: There is no hernia in the left inguinal area or right inguinal area.   Genitourinary:     General: Normal vulva.      Exam position: Supine.      Pubic Area: No rash.       Labia:         Right: No rash, tenderness, lesion or injury.         Left: No rash, tenderness, lesion or injury.       Urethra: No prolapse, urethral pain, urethral swelling or urethral lesion.      Vagina: Normal. No signs of injury and foreign body. No vaginal discharge, erythema, tenderness, bleeding, lesions or prolapsed vaginal walls.      Adnexa:         Right: No mass, tenderness or fullness.          Left: No mass, tenderness or fullness.        Rectum: No external hemorrhoid.      Comments: Urethra normal without lesions  No bladder tenderness  Cervix, uterus absent, no masses or tenderness on BME  Musculoskeletal:         General: Normal range of motion.      Cervical back: Normal range of motion and neck supple.   Lymphadenopathy:      Lower Body: No right inguinal adenopathy. No left inguinal adenopathy.   Skin:     General: Skin is warm and dry.   Neurological:      Mental Status: She is alert and oriented to person, place, and time.   Psychiatric:         Speech: Speech normal.         Behavior: Behavior normal. Behavior is cooperative.

## 2024-02-27 ENCOUNTER — OFFICE VISIT (OUTPATIENT)
Dept: GYNECOLOGY | Facility: CLINIC | Age: 54
End: 2024-02-27
Payer: COMMERCIAL

## 2024-02-27 VITALS
HEART RATE: 91 BPM | WEIGHT: 187.2 LBS | SYSTOLIC BLOOD PRESSURE: 100 MMHG | DIASTOLIC BLOOD PRESSURE: 70 MMHG | HEIGHT: 65 IN | BODY MASS INDEX: 31.19 KG/M2

## 2024-02-27 DIAGNOSIS — Z01.419 ENCOUNTER FOR GYNECOLOGICAL EXAMINATION (GENERAL) (ROUTINE) WITHOUT ABNORMAL FINDINGS: Primary | ICD-10-CM

## 2024-02-27 PROCEDURE — S0610 ANNUAL GYNECOLOGICAL EXAMINA: HCPCS | Performed by: OBSTETRICS & GYNECOLOGY

## 2024-03-01 ENCOUNTER — TELEPHONE (OUTPATIENT)
Dept: GASTROENTEROLOGY | Facility: MEDICAL CENTER | Age: 54
End: 2024-03-01

## 2024-03-01 ENCOUNTER — ANESTHESIA EVENT (OUTPATIENT)
Dept: ANESTHESIOLOGY | Facility: HOSPITAL | Age: 54
End: 2024-03-01

## 2024-03-01 ENCOUNTER — ANESTHESIA (OUTPATIENT)
Dept: ANESTHESIOLOGY | Facility: HOSPITAL | Age: 54
End: 2024-03-01

## 2024-03-01 ENCOUNTER — OFFICE VISIT (OUTPATIENT)
Dept: GASTROENTEROLOGY | Facility: MEDICAL CENTER | Age: 54
End: 2024-03-01
Payer: COMMERCIAL

## 2024-03-01 VITALS
HEIGHT: 65 IN | DIASTOLIC BLOOD PRESSURE: 73 MMHG | OXYGEN SATURATION: 97 % | BODY MASS INDEX: 31.16 KG/M2 | SYSTOLIC BLOOD PRESSURE: 108 MMHG | HEART RATE: 75 BPM | WEIGHT: 187 LBS | TEMPERATURE: 98.2 F

## 2024-03-01 DIAGNOSIS — R13.19 ESOPHAGEAL DYSPHAGIA: ICD-10-CM

## 2024-03-01 DIAGNOSIS — E16.2 LOW BLOOD SUGAR: Primary | ICD-10-CM

## 2024-03-01 PROCEDURE — 99203 OFFICE O/P NEW LOW 30 MIN: CPT | Performed by: STUDENT IN AN ORGANIZED HEALTH CARE EDUCATION/TRAINING PROGRAM

## 2024-03-01 NOTE — PROGRESS NOTES
[facility] - Outpatient Consultation  Negin Tapia 53 y.o. female MRN: [id number]  Encounter: [id number]          ASSESSMENT AND PLAN:    53F with h/o thyroid cancer s/p resection, BMI 32, hyperlipidemia here for 1 year of intermittent dysphagia.  No evidence of recurrence of her thyroid cancer on March 2023 CT neck.  Did not receive radiation for her thyroid cancer.  Differential includes esophageal stricture, esophagitis, EOE versus oropharyngeal etiology.  Extrinsic compression/mass less likely.  Will pursue EGD to assess for stricture, esophagitis, EOE.  If unrevealing, may warrant video barium swallow versus repeat CT.  1. Esophageal dysphagia  - Ambulatory Referral to Gastroenterology  - EGD; Future    2. Low blood sugar  Patient reports difficulties with hypoglycemia when she is not able to eat.  Will plan for a morning appointment for her endoscopy.      ______________________________________________________________________    HPI:    Since last year, has been having intermittent swallowing issues.  Used to be maybe once a month, since December getting more frequent.  Feels like things get lodged in the neck, has to keep drinking liquid to get it down.  Has happened with pills, spaghetti, lettuce, apple.  Doesn't have to regurgitate things back up.  Feels like neck is swollen.  Gets heartburn once or twice a week, was on prilosec for a while but stopped >5 years ago.    No prior EGD.    Had papillary thyroid cancer 2012, treated with surgery only.    Colonoscopy 12/27/2022 normal exam, 7yr recall     REVIEW OF SYSTEMS:    CONSTITUTIONAL: Denies any fever, chills, rigors, and weight loss.  HEENT: No earache or tinnitus. Denies hearing loss or visual disturbances.  CARDIOVASCULAR: No chest pain or palpitations.   RESPIRATORY: Denies any cough, hemoptysis, shortness of breath or dyspnea on exertion.  GASTROINTESTINAL: As noted in the History of Present Illness.    GENITOURINARY: No problems with urination. Denies any hematuria or dysuria.  NEUROLOGIC: No dizziness or vertigo, denies headaches.   MUSCULOSKELETAL: Denies any muscle or joint pain.   SKIN: Denies skin rashes or itching.   ENDOCRINE: Denies excessive thirst. Denies intolerance to heat or cold.  PSYCHOSOCIAL: Denies depression or anxiety. Denies any recent memory loss.       Historical Information   Past Medical History:   Diagnosis Date    Cancer (HCC)     Cervical neuritis 05/16/2013    C7- T1 Left, Last assessed    Disease of thyroid gland     Endometriosis, cervix     GERD (gastroesophageal reflux disease)     Herpes     Hiatal hernia     R INGUINAL    Hypothyroidism     Ovarian cyst     Precordial chest pain     Thyroid cancer (HCC)     THYROID     Past Surgical History:   Procedure Laterality Date    CHOLECYSTECTOMY  05/2020    COLONOSCOPY  12/2015 2015  Anastasiya. Normal exam, hemorrhoids.    COLONOSCOPY  2010    Dr. Langford.  Normal exam, hemorrhoids.    COLONOSCOPY  1996    Dr. Gonzalez.  Normal exam    HERNIA REPAIR      HYSTERECTOMY  2005    has ovaries    UT LAPAROSCOPY SURG CHOLECYSTECTOMY N/A 05/23/2019    Procedure: LAP. ZANE., IOCG;  Surgeon: Maya Harrison MD;  Location: [facility];  Service: General    THYROIDECTOMY      early 2008 [facility]    TUBAL LIGATION       Social History   Social History     Substance and Sexual Activity   Alcohol Use Not Currently    Alcohol/week: 1.0 standard drink of alcohol    Types: 1 Glasses of wine per week    Comment: socially     Social History     Substance and Sexual Activity   Drug Use No     Social History     Tobacco Use   Smoking Status Never   Smokeless Tobacco Never     Family History   Problem Relation Age of Onset    Thyroid disease Mother     Cancer Father     Colon polyps Father     Colon cancer Father     Arthritis Sister     Thyroid disease Sister     Thyroid disease Brother     Glaucoma Brother     Coronary artery disease Maternal Grandmother      "Diabetes Maternal Grandmother     Heart disease Maternal Grandmother     No Known Problems Maternal Grandfather     No Known Problems Paternal Grandmother     No Known Problems Paternal Grandfather        Meds/Allergies       Current Outpatient Medications:     Ascorbic Acid (VITAMIN C GUMMIE PO)    aspirin 81 mg chewable tablet    Calcium Citrate 200 MG TABS    cholecalciferol (VITAMIN D3) 1,000 units tablet    Flaxseed Oil OIL    Glucos-Chondroit-Collag-Hyal (GLUCOSAMINE CHONDROIT-COLLAGEN PO)    levothyroxine 88 mcg tablet    omega-3-acid ethyl esters (LOVAZA) 1 g capsule    Turmeric 500 MG CAPS    Lysine 1000 MG TABS    Magnesium 400 MG TABS    Multiple Vitamins-Minerals (ZINC PO)    valACYclovir (VALTREX) 1,000 mg tablet    Allergies   Allergen Reactions    Bee Venom Anaphylaxis    Dust Mite Extract     Gabapentin Dizziness     Annotation - 22Jan2015: \"felt like out of body experience\"    Molds & Smuts Sneezing    Other      Annotation - 30Jun2013: seasonal, Trees    Pollen Extract     Bernard Grass Pollen Allergen     Diclofenac Rash     tolerates naproxen    Latex Rash           Objective     Blood pressure 108/73, pulse 75, temperature 98.2 °F (36.8 °C), height 5' 4.5\" (1.638 m), weight 84.8 kg (187 lb), SpO2 97%. Body mass index is 31.6 kg/m².        PHYSICAL EXAM:      General Appearance:   Alert, cooperative, no distress   HEENT:   Normocephalic, atraumatic, anicteric.     Neck:  Supple, symmetrical, trachea midline.  Well-healed thyroidectomy scar.  No masses or lymph nodes appreciated.   Lungs:   Clear to auscultation bilaterally; no rales, rhonchi or wheezing; respirations unlabored    Heart::   Regular rate and rhythm; no murmur, rub, or gallop.   Abdomen:   Soft, non-tender, non-distended; normal bowel sounds; no masses, no organomegaly    Genitalia:   Deferred    Rectal:   Deferred    Extremities:  No cyanosis, clubbing or edema    Pulses:  2+ and symmetric    Skin:  No jaundice, rashes, or lesions  "   Lymph nodes:  No palpable cervical lymphadenopathy        Lab Results:   No visits with results within 1 Day(s) from this visit.   Latest known visit with results is:   Appointment on 02/19/2024   Component Date Value    Cholesterol 02/19/2024 198     Triglycerides 02/19/2024 106     HDL, Direct 02/19/2024 42 (L)     LDL Calculated 02/19/2024 135 (H)     Non-HDL-Chol (CHOL-HDL) 02/19/2024 156     Sodium 02/19/2024 143     Potassium 02/19/2024 4.3     Chloride 02/19/2024 107     CO2 02/19/2024 27     ANION GAP 02/19/2024 9     BUN 02/19/2024 11     Creatinine 02/19/2024 0.75     Glucose, Fasting 02/19/2024 89     Calcium 02/19/2024 9.4     AST 02/19/2024 20     ALT 02/19/2024 25     Alkaline Phosphatase 02/19/2024 65     Total Protein 02/19/2024 6.8     Albumin 02/19/2024 4.3     Total Bilirubin 02/19/2024 0.54     eGFR 02/19/2024 90     Hemoglobin A1C 02/19/2024 5.6     EAG 02/19/2024 114     Hepatitis C Ab 02/19/2024 Non-reactive     Vit D, 25-Hydroxy 02/19/2024 22.7 (L)     Bilirubin, Direct 02/19/2024 0.16          Radiology Results:   No results found.

## 2024-03-01 NOTE — TELEPHONE ENCOUNTER
Procedure: EGD  Date: 03/07/2024  Physician performing: Dr. Amos  Location of procedure:  [facility]  Instructions given to patient: Yes  Diabetic: No  Clearances: N/A

## 2024-03-06 RX ORDER — ALBUTEROL SULFATE 2.5 MG/3ML
2.5 SOLUTION RESPIRATORY (INHALATION) ONCE AS NEEDED
Status: CANCELLED | OUTPATIENT
Start: 2024-03-06

## 2024-03-06 RX ORDER — ONDANSETRON 2 MG/ML
4 INJECTION INTRAMUSCULAR; INTRAVENOUS ONCE AS NEEDED
Status: CANCELLED | OUTPATIENT
Start: 2024-03-06

## 2024-03-06 RX ORDER — SODIUM CHLORIDE 9 MG/ML
125 INJECTION, SOLUTION INTRAVENOUS CONTINUOUS
Status: CANCELLED | OUTPATIENT
Start: 2024-03-06

## 2024-03-07 ENCOUNTER — ANESTHESIA EVENT (OUTPATIENT)
Dept: GASTROENTEROLOGY | Facility: MEDICAL CENTER | Age: 54
End: 2024-03-07

## 2024-03-07 ENCOUNTER — ANESTHESIA (OUTPATIENT)
Dept: GASTROENTEROLOGY | Facility: MEDICAL CENTER | Age: 54
End: 2024-03-07

## 2024-03-07 ENCOUNTER — HOSPITAL ENCOUNTER (OUTPATIENT)
Dept: GASTROENTEROLOGY | Facility: MEDICAL CENTER | Age: 54
Setting detail: OUTPATIENT SURGERY
End: 2024-03-07
Payer: COMMERCIAL

## 2024-03-07 ENCOUNTER — NURSE TRIAGE (OUTPATIENT)
Age: 54
End: 2024-03-07

## 2024-03-07 VITALS
OXYGEN SATURATION: 99 % | TEMPERATURE: 97.7 F | BODY MASS INDEX: 31.41 KG/M2 | HEART RATE: 68 BPM | WEIGHT: 184 LBS | SYSTOLIC BLOOD PRESSURE: 110 MMHG | DIASTOLIC BLOOD PRESSURE: 59 MMHG | HEIGHT: 64 IN | RESPIRATION RATE: 17 BRPM

## 2024-03-07 DIAGNOSIS — R13.19 ESOPHAGEAL DYSPHAGIA: ICD-10-CM

## 2024-03-07 PROCEDURE — 88305 TISSUE EXAM BY PATHOLOGIST: CPT | Performed by: PATHOLOGY

## 2024-03-07 PROCEDURE — 43239 EGD BIOPSY SINGLE/MULTIPLE: CPT | Performed by: STUDENT IN AN ORGANIZED HEALTH CARE EDUCATION/TRAINING PROGRAM

## 2024-03-07 RX ORDER — LIDOCAINE HYDROCHLORIDE 20 MG/ML
INJECTION, SOLUTION EPIDURAL; INFILTRATION; INTRACAUDAL; PERINEURAL AS NEEDED
Status: DISCONTINUED | OUTPATIENT
Start: 2024-03-07 | End: 2024-03-07

## 2024-03-07 RX ORDER — ALBUTEROL SULFATE 2.5 MG/3ML
2.5 SOLUTION RESPIRATORY (INHALATION) ONCE AS NEEDED
Status: DISCONTINUED | OUTPATIENT
Start: 2024-03-07 | End: 2024-03-11 | Stop reason: HOSPADM

## 2024-03-07 RX ORDER — PROPOFOL 10 MG/ML
INJECTION, EMULSION INTRAVENOUS AS NEEDED
Status: DISCONTINUED | OUTPATIENT
Start: 2024-03-07 | End: 2024-03-07

## 2024-03-07 RX ORDER — SODIUM CHLORIDE 9 MG/ML
125 INJECTION, SOLUTION INTRAVENOUS CONTINUOUS
Status: DISCONTINUED | OUTPATIENT
Start: 2024-03-07 | End: 2024-03-11 | Stop reason: HOSPADM

## 2024-03-07 RX ORDER — ONDANSETRON 2 MG/ML
4 INJECTION INTRAMUSCULAR; INTRAVENOUS ONCE AS NEEDED
Status: DISCONTINUED | OUTPATIENT
Start: 2024-03-07 | End: 2024-03-11 | Stop reason: HOSPADM

## 2024-03-07 RX ADMIN — LIDOCAINE HYDROCHLORIDE 5 ML: 20 INJECTION, SOLUTION EPIDURAL; INFILTRATION; INTRACAUDAL at 09:28

## 2024-03-07 RX ADMIN — PROPOFOL 150 MG: 10 INJECTION, EMULSION INTRAVENOUS at 09:28

## 2024-03-07 RX ADMIN — SODIUM CHLORIDE 125 ML/HR: 0.9 INJECTION, SOLUTION INTRAVENOUS at 09:03

## 2024-03-07 NOTE — ANESTHESIA PREPROCEDURE EVALUATION
Procedure:  EGD    Relevant Problems   CARDIO   (+) Left bundle branch block   (+) Mixed hyperlipidemia      ENDO   (+) Postoperative hypothyroidism      GI/HEPATIC   (+) Oral phase dysphagia      MUSCULOSKELETAL   (+) Lumbar degenerative disc disease   (+) Primary osteoarthritis of left knee        Physical Exam    Airway    Mallampati score: II  TM Distance: <3 FB       Dental   No notable dental hx     Cardiovascular  Cardiovascular exam normal    Pulmonary  Pulmonary exam normal     Other Findings  post-pubertal.      Anesthesia Plan  ASA Score- 3     Anesthesia Type- IV sedation with anesthesia with ASA Monitors.         Additional Monitors:     Airway Plan:            Plan Factors-Exercise tolerance (METS): >4 METS.    Chart reviewed.        Patient is not a current smoker.  Patient instructed to abstain from smoking on day of procedure. Patient did not smoke on day of surgery.    Obstructive sleep apnea risk education given perioperatively.        Induction- intravenous.    Postoperative Plan-     Informed Consent- Anesthetic plan and risks discussed with patient.

## 2024-03-07 NOTE — ANESTHESIA POSTPROCEDURE EVALUATION
"Post-Op Assessment Note    CV Status:  Stable    Pain management: adequate       Mental Status:  Alert and awake   Hydration Status:  Euvolemic   PONV Controlled:  Controlled   Airway Patency:  Patent     Post Op Vitals Reviewed: Yes    No anethesia notable event occurred.    Staff: Anesthesiologist               BP      Temp      Pulse     Resp      SpO2      /59   Pulse 68   Temp 97.7 °F (36.5 °C) (Temporal)   Resp 17   Ht 5' 4\" (1.626 m)   Wt 83.5 kg (184 lb)   SpO2 99%   BMI 31.58 kg/m²     "

## 2024-03-07 NOTE — H&P
History and Physical -  [facility]  Ngein Tapia 53 y.o. female MRN: [id number]                  HPI: Negin Tapia is a 53 y.o. year old female who presents for dysphagia      REVIEW OF SYSTEMS: Per the HPI, and otherwise unremarkable.    Historical Information   Past Medical History:   Diagnosis Date    Cancer (HCC)     Thyroid    Cervical neuritis 05/16/2013    C7- T1 Left, Last assessed    Disease of thyroid gland     Endometriosis, cervix     GERD (gastroesophageal reflux disease)     Herpes     Hiatal hernia     R INGUINAL    Hypothyroidism     Left bundle branch block     Ovarian cyst     Precordial chest pain     Thyroid cancer (HCC)     THYROID     Past Surgical History:   Procedure Laterality Date    CHOLECYSTECTOMY  05/2020    COLONOSCOPY  12/2015 2015  Anastasiya. Normal exam, hemorrhoids.    COLONOSCOPY  2010    Dr. Langford.  Normal exam, hemorrhoids.    COLONOSCOPY  1996    Dr. Gonzalez.  Normal exam    HERNIA REPAIR      HYSTERECTOMY  2005    has ovaries    CT LAPAROSCOPY SURG CHOLECYSTECTOMY N/A 05/23/2019    Procedure: LAP. ZANE., IOCG;  Surgeon: Maya Harrison MD;  Location: [facility];  Service: General    THYROIDECTOMY      early 2008 [facility]    TUBAL LIGATION       Social History   Social History     Substance and Sexual Activity   Alcohol Use Not Currently    Alcohol/week: 1.0 standard drink of alcohol    Types: 1 Glasses of wine per week    Comment: socially     Social History     Substance and Sexual Activity   Drug Use No     Social History     Tobacco Use   Smoking Status Never   Smokeless Tobacco Never     Family History   Problem Relation Age of Onset    Thyroid disease Mother     Cancer Father     Colon polyps Father     Colon cancer Father     Arthritis Sister     Thyroid disease Sister     Thyroid disease Brother     Glaucoma Brother     Coronary artery disease Maternal Grandmother     Diabetes Maternal Grandmother     Heart disease Maternal Grandmother      "No Known Problems Maternal Grandfather     No Known Problems Paternal Grandmother     No Known Problems Paternal Grandfather        Meds/Allergies       Current Outpatient Medications:     Ascorbic Acid (VITAMIN C GUMMIE PO)    aspirin 81 mg chewable tablet    Calcium Citrate 200 MG TABS    cholecalciferol (VITAMIN D3) 1,000 units tablet    Flaxseed Oil OIL    Glucos-Chondroit-Collag-Hyal (GLUCOSAMINE CHONDROIT-COLLAGEN PO)    levothyroxine 88 mcg tablet    Turmeric 500 MG CAPS    Lysine 1000 MG TABS    Magnesium 400 MG TABS    Multiple Vitamins-Minerals (ZINC PO)    omega-3-acid ethyl esters (LOVAZA) 1 g capsule    valACYclovir (VALTREX) 1,000 mg tablet    Current Facility-Administered Medications:     sodium chloride 0.9 % infusion, 125 mL/hr, Intravenous, Continuous, 125 mL/hr at 03/07/24 0903    Allergies   Allergen Reactions    Bee Venom Anaphylaxis    Dust Mite Extract     Gabapentin Dizziness     Annotation - 22Jan2015: \"felt like out of body experience\"    Molds & Smuts Sneezing    Other      Annotation - [id number]: seasonal, Trees    Pollen Extract     Bernard Grass Pollen Allergen     Diclofenac Rash     tolerates naproxen    Latex Rash       Objective     /66   Pulse 70   Temp 97.7 °F (36.5 °C) (Temporal)   Resp 18   Ht 5' 4\" (1.626 m)   Wt 83.5 kg (184 lb)   SpO2 99%   BMI 31.58 kg/m²       PHYSICAL EXAM    Gen: NAD  Head: NCAT  CV: RRR  CHEST: Clear  ABD: soft, NT/ND  EXT: no edema      ASSESSMENT/PLAN:  This is a 53 y.o. year old female here for EGD, and she is stable and optimized for her procedure.        "

## 2024-03-07 NOTE — TELEPHONE ENCOUNTER
"Pt. C/o cough after returning home from EGD today, advised it is normal to have a cough and hoarse voice, pt. Asking if she can take delsym, advised it would be safe for her to do that if symptoms do not improve I advised her to call back, pt. Agreeable       Reason for Disposition   Cough with no complications    Answer Assessment - Initial Assessment Questions  1. ONSET: \"When did the cough begin?\"       Today after EGD   2. SEVERITY: \"How bad is the cough today?\"       Mild-moderate   3. SPUTUM: \"Describe the color of your sputum\" (none, dry cough; clear, white, yellow, green)      Denies   10. OTHER SYMPTOMS: \"Do you have any other symptoms?\" (e.g., runny nose, wheezing, chest pain)        Hoarse voice  11. PREGNANCY: \"Is there any chance you are pregnant?\" \"When was your last menstrual period?\"        Denies   12. TRAVEL: \"Have you traveled out of the country in the last month?\" (e.g., travel history, exposures)        denies    Protocols used: Cough-ADULT-[place]    "

## 2024-03-11 PROCEDURE — 88305 TISSUE EXAM BY PATHOLOGIST: CPT | Performed by: PATHOLOGY

## 2024-03-19 ENCOUNTER — HOSPITAL ENCOUNTER (OUTPATIENT)
Dept: NON INVASIVE DIAGNOSTICS | Facility: CLINIC | Age: 54
Discharge: HOME | End: 2024-03-19
Payer: COMMERCIAL

## 2024-03-19 DIAGNOSIS — I77.3 FIBROMUSCULAR DYSPLASIA OF BOTH CAROTID ARTERIES (HCC): ICD-10-CM

## 2024-03-19 PROCEDURE — 93880 EXTRACRANIAL BILAT STUDY: CPT

## 2024-03-20 PROCEDURE — 93880 EXTRACRANIAL BILAT STUDY: CPT | Performed by: SURGERY

## 2024-03-25 DIAGNOSIS — B00.1 COLD SORE: ICD-10-CM

## 2024-03-25 RX ORDER — VALACYCLOVIR HYDROCHLORIDE 1 G/1
1000 TABLET, FILM COATED ORAL 2 TIMES DAILY
Qty: 4 TABLET | Refills: 2 | Status: SHIPPED | OUTPATIENT
Start: 2024-03-25 | End: 2024-03-31

## 2024-04-03 ENCOUNTER — CLINICAL SUPPORT (OUTPATIENT)
Dept: NUTRITION | Facility: HOSPITAL | Age: 54
End: 2024-04-03
Payer: COMMERCIAL

## 2024-04-03 VITALS — WEIGHT: 184.4 LBS | BODY MASS INDEX: 31.65 KG/M2

## 2024-04-03 DIAGNOSIS — E66.01 MORBID OBESITY (HCC): Primary | ICD-10-CM

## 2024-04-03 PROCEDURE — 97803 MED NUTRITION INDIV SUBSEQ: CPT | Performed by: DIETITIAN, REGISTERED

## 2024-04-03 NOTE — PROGRESS NOTES
" Nutrition Assessment Form    Patient Name: Negin Tapia    YOB: 1970    Sex: Female     Assessment Date: 4/3/2024  Start Time: 830 Stop Time: 9 Total Minutes: 30     Data:  Present at session: self   Parent/Patient Concerns/reason for visit: \"I'm trying to do good\"   Medical Dx/Reason for Referral: IFG obesity HLD   Past Medical History:   Diagnosis Date    Cancer (HCC)     Thyroid    Cervical neuritis 05/16/2013    C7- T1 Left, Last assessed    Disease of thyroid gland     Endometriosis, cervix     GERD (gastroesophageal reflux disease)     Herpes     Hiatal hernia     R INGUINAL    Hypothyroidism     Left bundle branch block     Ovarian cyst     Precordial chest pain     Thyroid cancer (HCC)     THYROID       Current Outpatient Medications   Medication Sig Dispense Refill    Ascorbic Acid (VITAMIN C GUMMIE PO) Take by mouth daily      aspirin 81 mg chewable tablet Chew 1 tablet (81 mg total) daily      Calcium Citrate 200 MG TABS One tablet daily      cholecalciferol (VITAMIN D3) 1,000 units tablet Take 2,000 Units by mouth daily       Flaxseed Oil OIL Take 1 capsule by mouth daily       Glucos-Chondroit-Collag-Hyal (GLUCOSAMINE CHONDROIT-COLLAGEN PO)       levothyroxine 88 mcg tablet 100 mcg      Lysine 1000 MG TABS Take 1,000 mg by mouth daily  (Patient not taking: Reported on 2/27/2024)      Magnesium 400 MG TABS Take 1 tablet by mouth daily  (Patient not taking: Reported on 2/27/2024)      Multiple Vitamins-Minerals (ZINC PO) Take by mouth (Patient not taking: Reported on 3/1/2024)      omega-3-acid ethyl esters (LOVAZA) 1 g capsule Take 2 g by mouth 2 (two) times a day      Turmeric 500 MG CAPS Take 1 tablet by mouth daily       valACYclovir (VALTREX) 1,000 mg tablet Take 1 tablet (1,000 mg total) by mouth 2 (two) times a day for 6 days 4 tablet 2     No current facility-administered medications for this visit.        Additional Meds/Supplements: N/a   Special Learning Needs/barriers to " "learning/any new barriers N/a   Height: HC Readings from Last 5 Encounters:   No data found for HC      Weight: Wt Readings from Last 10 Encounters:   03/07/24 83.5 kg (184 lb)   03/01/24 84.8 kg (187 lb)   02/27/24 84.9 kg (187 lb 3.2 oz)   02/19/24 84.5 kg (186 lb 3.2 oz)   01/23/24 86.2 kg (190 lb)   01/12/24 86.9 kg (191 lb 9.6 oz)   11/21/23 86.8 kg (191 lb 6.4 oz)   11/09/23 86.4 kg (190 lb 6.4 oz)   08/18/23 85.2 kg (187 lb 12.8 oz)   08/17/23 85.3 kg (188 lb)     Estimated body mass index is 31.58 kg/m² as calculated from the following:    Height as of 3/7/24: 5' 4\" (1.626 m).    Weight as of 3/7/24: 83.5 kg (184 lb).   Recent Weight Change: [x]Yes     []No  Amount: 2# loss desired and intentional      Energy Needs: 1655cals (25cals/kg - 500 for 1#/wk wt loss)   Allergies   Allergen Reactions    Bee Venom Anaphylaxis    Dust Mite Extract     Gabapentin Dizziness     Annotation - 22Jan2015: \"felt like out of body experience\"    Molds & Smuts Sneezing    Other      Annotation - [id number]: seasonal, Trees    Pollen Extract     Bernard Grass Pollen Allergen     Diclofenac Rash     tolerates naproxen    Latex Rash    or intolerances NKFA   Social History     Substance and Sexual Activity   Alcohol Use Not Currently    Alcohol/week: 1.0 standard drink of alcohol    Types: 1 Glasses of wine per week    Comment: socially    Wine 2-3x/year   Social History     Tobacco Use   Smoking Status Never   Smokeless Tobacco Never       Who shops? patient   Who cooks/cooking methods/Eating out/take out habits   patient  Cooking methods: bake/betancur/air betancur/grill/boil/other________    Out to eat 3-4x/wk   Exercise:  PT now for hip bursitis and now knees-  3x/wk (since Jan) now up to 4x/wk      Other: ie: Sleep habits/ stress level/ work habits household-lives with ?/ food security Bed at 830-9- asleep by 930-10 and awake at 130/3-4 waking up for good around 5-515 (alarm or no alarm) - exhausted with 5-6 hours-if wakes up a few " times feels better-   Energy fluctuates daily-  Stress level - 4/10   at [employer]- working 40 hours  Lives with  and dgt (26yo)   Prior Nutritional Counseling? []Yes     [x]No  When:      Why:         Diet Hx:  hypoglycemic episodes (sometimes- last one 2-3 weeks ago)- keeps raisins and protein bars ( quest- blueberry or peanut butter and chocolate x 20gms protein)  Breakfast:  drinking water  32oz container and will drink 2-3x/daily with lela- will also drink sleepy time tea in the morning with tsp honey- ice tea rarely- maybe 1x/wk if that Diet:  7-8-830  smoothie berries x1/2c  (1c in total)- yogurt- vanilla non fat yogurt x 2 TBSP- 2 oz cherry juice (20oz in total)   Never skips  Or will have 1 pkg oatmeal- apple cinnamon-    Lunch: 12 salad- peppers tomatoes, nuts- 4oz pkg almonds/pepitas/etc cucumbers, carrots, olives x 10  green - (low sodium) broccoli, sometimes tuna or chicken x2-3oz- dressing vinagraitte- low fat - 2 TBSP   May order s/w when order out   Dinner: 530-630 ( cooks) meat starch - (pretty hungry)- chicken enchiladas- sour cream/ cream of chicken soup- canned/ stroganoff/ pot roast     Snacks: AM - protein bar maybe 2x/wk  PM - n/a  HS - apple- or fruit or yogurt flip    Other Notes/ Initial Assessment:  Negin is here to learn to eat better and find out what is good for her and not good for her, she would also like to lose weight.  She does work and her  is disabled so they will go out sometimes if he is not able to cook.  Her current stress level is manageable, she works a normal job, sleep is an issue for her, feels like it could be trauma from previous job.  Discussed counseling as an option to help her deal with that.    Discussed importance of 3 meals daily, sufficient calories early in the day to prevent being overly hungry at night, adequate fluid, eliminating artificial sweeteners, eliminating lysine, Vit D with MD orders only, and potential for zinc to  cause anemia, importance of adequate sleep, activity and calories in general and portions per plate method.     Provided 1500 norm sample menu, wt loss tips, healthy snack ideas, LDL cholesterol lowering therapy from [facility], and RD name and number for home use.  Follow up made for April 3rd.    Updated assessment (Follow up note only):  4/3/24  Negin is here to follow up with her wt and her health, her wt is trending down a couple pounds, she checks it weekly at home, she eats eggs/vegetables and ham or oatmeal for B and salad x2c, 1/2c tomatoes/ 1 c broccoli, 1/2c cucumbers, 1/2c zucchini, raspberry x 2 TBSP, for dinner.  She continues PT for her hip bursitis.  She has increased her fruit and vegetables intake and continues to eat tuna and chicken.  Has decreased her cheese intake.  She continues to eat potatoes. She is eating smaller portions. She feels this is enough food for her, she is not going to bed hungry.  She does eat a protein bar as well.  Nutrition label reviewed-380 cals sima basurto.  She is also drinking a turmeric drink with 2 gms sugar.  She has increased her lowfat fair life milk intake, and still eats yogurt non-greek.  She is drinking 64 oz water plus 12oz at home. Cuts water off at 6-7 and is using restroom less at night.  Sleep is better at 8-8.5 hours, going to bed at 830-9 and up at 515.  Feels good with that amount of sleep. She does her elliptical 2x/wk x 5 minutes.  She is also walking the steps at work.  She also p[lans to walk outside with her neighbor.  She is tracking her calories with Coupzpal (but not consistently) and she is eating 1500-1700cals.  Her goal is 1600 cals.  Her goals for next visit, are better tracking, increasing activity, and eating more greens.         Nutrition Diagnosis:   Altered Nutrition-Related Laboratory values  related to Kidney, liver, cardiac, endocrine, neurologic, and/or pulmonary dysfunction as evidenced by  Abnormal plasma glucose and/or HgbA1c  levels       Any change or new dx since previous visit:     Nutrition Diagnosis:   Overweight/obesity  related to Excess energy intake as evidenced by  BMI more than normative standard for age and sex (obesity-grade I 30-34.9)      Medical Nutrition Therapy Intervention:  [x]Individualized Meal Plan-Discussed the importance of eating 3 meals daily and not skipping, and how metabolism is affected.  Also discussed adding in small snacks or 5-6 small meals daily if desired vs 3 larger ones, and appropriate options and portions.  Appropriate timing of meals, including eating within 1 hr of waking and eating meals slowly 20mins/meals, minimizing mindless/boredom or habitual eating, etc was also mentioned.  Discussed the plate method of portioning foods, including half a plate fruits and vegetables or a half plate all vegetables, 1/4 of the plate a lean protein source or meat, and a 1/4 of the plate being a whole grain carb- usually 1/2-1c.  This should be followed for at least 2 meals of the day, but could also be followed for all 3.  Fluid intake discussed and appropriate amounts and choices, 16 oz with meals and additional 32oz during the day.  S/S dehydration also covered. [x]Understanding Lab Values- discussed Vit D cholesterol and HDL   []Basic Pathophysiology of Disease [x]Food/Medication Interactions- discussed stopping lysine and zinc, checking Vit D before continuing to supplements   []Food Diary [x]Exercise-150 mins of moderate activity weekly, discussed importance of variety of activity, including wt bearing activities 2-3x/wk x 10-15mins. Discussed importance of activity throughout the lifecycle and its impact on overall health/stress management, etc.   [x]Lifestyle/Behavior Modification Techniques-Discussed the importance of adequate sleep, and ideal sleeping conditions, including a cool temperature 64-68 degrees F, and a dark and quiet room. Also discussed the importance of a regular and consistent sleep  "routine, including minimizing blue light exposure an hour before sleeping.  Weekend habits should include staying fairly consistent with weekday sleep habits to minimize disruption during the week.  A connection was made between getting adequate and good quality sleep and the ability to handle stress the next day, make healthy food choices, and be active. []Medication, Mechanism of Action   []Label Reading: CHO/ Na/ Fat/ other_________ []Self Blood Glucose Monitoring   [x]Weight/BMI Goals: gain/lose/maintain-Short term goal of 2-4# x 1 month or next visit-goal wt 165 []Other -           Comprehension: []Excellent  []Very Good  [x]Good  []Fair   []Poor    Receptivity: []Excellent  []Very Good  [x]Good  []Fair   []Poor    Expected Compliance: []Excellent  []Very Good  [x]Good  []Fair   []Poor        Goals (initial)/ Progress made on previous goals/new goals:   3 meals daily no skipping - 1500-1800cals   2.  Portions per plate method as discussed with RD   3.    Eliminate zinc and lysine       No follow-ups on file.  Labs:  CMP  Lab Results   Component Value Date     05/03/2015    K 4.3 02/19/2024     02/19/2024    CO2 27 02/19/2024    ANIONGAP 8 05/03/2015    BUN 11 02/19/2024    CREATININE 0.75 02/19/2024    GLUCOSE 92 05/03/2015    GLUF 89 02/19/2024    CALCIUM 9.4 02/19/2024    AST 20 02/19/2024    ALT 25 02/19/2024    ALKPHOS 65 02/19/2024    PROT 6.9 05/03/2015    BILITOT 0.6 05/03/2015    EGFR 90 02/19/2024       BMP  Lab Results   Component Value Date    GLUCOSE 92 05/03/2015    CALCIUM 9.4 02/19/2024     05/03/2015    K 4.3 02/19/2024    CO2 27 02/19/2024     02/19/2024    BUN 11 02/19/2024    CREATININE 0.75 02/19/2024       Lipids  No results found for: \"CHOL\"  Lab Results   Component Value Date    HDL 42 (L) 02/19/2024    HDL 41 (L) 05/20/2023    HDL 42 10/02/2021     Lab Results   Component Value Date    LDLCALC 135 (H) 02/19/2024    LDLCALC 170 (H) 05/20/2023    LDLCALC 155 (H) " "10/02/2021     Lab Results   Component Value Date    TRIG 106 02/19/2024    TRIG 135 05/20/2023    TRIG 88 10/02/2021     No results found for: \"CHOLHDL\"    Hemoglobin A1C  Lab Results   Component Value Date    HGBA1C 5.6 02/19/2024       Fasting Glucose  Lab Results   Component Value Date    GLUF 89 02/19/2024       Insulin     Thyroid  Lab Results   Component Value Date    TSH 2.59 10/03/2023       Hepatic Function Panel  Lab Results   Component Value Date    ALT 25 02/19/2024    AST 20 02/19/2024    ALKPHOS 65 02/19/2024    BILITOT 0.6 05/03/2015       Celiac Disease Antibody Panel  No results found for: \"ENDOMYSIAL IGA\", \"GLIADIN IGA\", \"GLIADIN IGG\", \"IGA\", \"TISSUE TRANSGLUT AB\", \"TTG IGA\"   Iron  Lab Results   Component Value Date    FERRITIN 297 12/30/2019            Reginald Chen RD  [facility]  [address] [place] [id number]    "

## 2024-04-08 NOTE — ASSESSMENT & PLAN NOTE
Addended by: JADYN GAO on: 4/8/2024 03:37 PM     Modules accepted: Orders     Suspect meniscus tear will check MRI and refer to ortho Patient to take naprosyn as directed

## 2024-06-19 ENCOUNTER — TELEPHONE (OUTPATIENT)
Dept: GASTROENTEROLOGY | Facility: MEDICAL CENTER | Age: 54
End: 2024-06-19

## 2024-07-23 ENCOUNTER — OFFICE VISIT (OUTPATIENT)
Dept: OBGYN CLINIC | Facility: MEDICAL CENTER | Age: 54
End: 2024-07-23
Payer: COMMERCIAL

## 2024-07-23 ENCOUNTER — TELEPHONE (OUTPATIENT)
Age: 54
End: 2024-07-23

## 2024-07-23 VITALS
HEIGHT: 64 IN | BODY MASS INDEX: 32.11 KG/M2 | WEIGHT: 188.1 LBS | HEART RATE: 78 BPM | DIASTOLIC BLOOD PRESSURE: 74 MMHG | SYSTOLIC BLOOD PRESSURE: 127 MMHG

## 2024-07-23 DIAGNOSIS — M25.562 ACUTE PAIN OF LEFT KNEE: Primary | ICD-10-CM

## 2024-07-23 DIAGNOSIS — M25.552 LEFT HIP PAIN: ICD-10-CM

## 2024-07-23 DIAGNOSIS — M17.12 PRIMARY OSTEOARTHRITIS OF LEFT KNEE: Primary | ICD-10-CM

## 2024-07-23 DIAGNOSIS — M25.562 ACUTE PAIN OF LEFT KNEE: ICD-10-CM

## 2024-07-23 PROCEDURE — 20610 DRAIN/INJ JOINT/BURSA W/O US: CPT | Performed by: ORTHOPAEDIC SURGERY

## 2024-07-23 PROCEDURE — 99213 OFFICE O/P EST LOW 20 MIN: CPT | Performed by: ORTHOPAEDIC SURGERY

## 2024-07-23 RX ORDER — TRIAMCINOLONE ACETONIDE 40 MG/ML
40 INJECTION, SUSPENSION INTRA-ARTICULAR; INTRAMUSCULAR
Status: COMPLETED | OUTPATIENT
Start: 2024-07-23 | End: 2024-07-23

## 2024-07-23 RX ORDER — ROPIVACAINE HYDROCHLORIDE 2 MG/ML
2 INJECTION, SOLUTION EPIDURAL; INFILTRATION; PERINEURAL
Status: COMPLETED | OUTPATIENT
Start: 2024-07-23 | End: 2024-07-23

## 2024-07-23 RX ADMIN — TRIAMCINOLONE ACETONIDE 40 MG: 40 INJECTION, SUSPENSION INTRA-ARTICULAR; INTRAMUSCULAR at 08:45

## 2024-07-23 RX ADMIN — ROPIVACAINE HYDROCHLORIDE 2 ML: 2 INJECTION, SOLUTION EPIDURAL; INFILTRATION; PERINEURAL at 08:45

## 2024-07-23 NOTE — PROGRESS NOTES
Assessment/Plan:  1. Primary osteoarthritis of left knee    2. Acute pain of left knee    3. Left hip pain      Orders Placed This Encounter   Procedures    Large joint arthrocentesis: L knee       Patient has severe left knee patellofemoral arthritis.   We discussed treatment options as well as risks and benefits of treatment options.  After a discussion of risks and benefits the patient elected to proceed with a L knee steroid injection today.  Patient should ice and avoid strenuous activity for 1-2 days if needed.  Patient should avoid vaccines for 2 weeks if possible.  If patient is diabetic should also monitor glucose over the next 7 to 10 days.    Diclofenac or advil or naproxen as needed for pain.  Patient aware not to take NSAIDs on same day  Can take Tylenol 1,000mg by mouth every 8 hours as needed for pain.  Do not exceed 3,000mg per day.    Continue with PT/HEP  Will consider viscosupplementation.  We will look into her authorization and see if it is still authorized.  We will let her know.  She knows to call us if she doesn't hear from us by the end of the week  Will continue with conservative treatment for now.  We discussed surgery as an option for the future as well.    Patient is aware pes anserine bursa steroid injection is an option if her pain persists in that localized area.      Return in about 2 months (around 9/23/2024).    I answered all of the patient's questions during the visit and provided education of the patient's condition during the visit.  The patient verbalized understanding of the information given and agrees with the plan.  This note was dictated using Linki software.  It may contain errors including improperly dictated words.  Please contact physician directly for any questions.    Subjective   Chief Complaint:   Chief Complaint   Patient presents with    Left Knee - Follow-up       HPI  Negin Tapia is a 53 y.o. female who presents for follow up for left knee pain. She  was last here on 11/21 and received a steroid injection.  This helped for about 4-5 weeks.  She also has been doing PT through an [facility] which has been helping up until recently.  Over the past month her pain has worsened.  Her pain is mostly over the anterolateral, anterior, and anteromedial aspect.  It is constant.  She describes it as dull and achy, intermittently sharp especially with squatting.  She notes a fall in January or February but her pain went back to baseline after fall and declined new XRs today.  She takes advil and tylenol as needed which helps some.      Review of Systems  ROS:    See HPI for musculoskeletal review.   All other systems reviewed are negative     History:  Past Medical History:   Diagnosis Date    Cancer (HCC)     Thyroid    Cervical neuritis 05/16/2013    C7- T1 Left, Last assessed    Disease of thyroid gland     Endometriosis, cervix     GERD (gastroesophageal reflux disease)     Herpes     Hiatal hernia     R INGUINAL    Hypothyroidism     Left bundle branch block     Ovarian cyst     Precordial chest pain     Thyroid cancer (HCC)     THYROID     Past Surgical History:   Procedure Laterality Date    CHOLECYSTECTOMY  05/2020    COLONOSCOPY  12/2015 2015  Anastasiya. Normal exam, hemorrhoids.    COLONOSCOPY  2010    Dr. Langford.  Normal exam, hemorrhoids.    COLONOSCOPY  1996    Dr. Gonzalez.  Normal exam    HERNIA REPAIR      HYSTERECTOMY  2005    has ovaries    IL LAPAROSCOPY SURG CHOLECYSTECTOMY N/A 05/23/2019    Procedure: LAP. ZANE., IOCG;  Surgeon: Maya Harrison MD;  Location: [facility];  Service: General    THYROIDECTOMY      early 2008 [facility]    TUBAL LIGATION       Social History   Social History     Substance and Sexual Activity   Alcohol Use Not Currently    Alcohol/week: 1.0 standard drink of alcohol    Types: 1 Glasses of wine per week    Comment: socially     Social History     Substance and Sexual Activity   Drug Use No     Social History     Tobacco Use   Smoking  "Status Never   Smokeless Tobacco Never     Family History:   Family History   Problem Relation Age of Onset    Thyroid disease Mother     Cancer Father     Colon polyps Father     Colon cancer Father     Arthritis Sister     Thyroid disease Sister     Thyroid disease Brother     Glaucoma Brother     Coronary artery disease Maternal Grandmother     Diabetes Maternal Grandmother     Heart disease Maternal Grandmother     No Known Problems Maternal Grandfather     No Known Problems Paternal Grandmother     No Known Problems Paternal Grandfather        Current Outpatient Medications on File Prior to Visit   Medication Sig Dispense Refill    Ascorbic Acid (VITAMIN C GUMMIE PO) Take by mouth daily      aspirin 81 mg chewable tablet Chew 1 tablet (81 mg total) daily      Calcium Citrate 200 MG TABS One tablet daily      cholecalciferol (VITAMIN D3) 1,000 units tablet Take 2,000 Units by mouth daily       Flaxseed Oil OIL Take 1 capsule by mouth daily       Glucos-Chondroit-Collag-Hyal (GLUCOSAMINE CHONDROIT-COLLAGEN PO)       levothyroxine 88 mcg tablet 100 mcg      Lysine 1000 MG TABS Take 1,000 mg by mouth daily  (Patient not taking: Reported on 2/27/2024)      Magnesium 400 MG TABS Take 1 tablet by mouth daily  (Patient not taking: Reported on 2/27/2024)      Multiple Vitamins-Minerals (ZINC PO) Take by mouth (Patient not taking: Reported on 3/1/2024)      omega-3-acid ethyl esters (LOVAZA) 1 g capsule Take 2 g by mouth 2 (two) times a day      Turmeric 500 MG CAPS Take 1 tablet by mouth daily       valACYclovir (VALTREX) 1,000 mg tablet Take 1 tablet (1,000 mg total) by mouth 2 (two) times a day for 6 days 4 tablet 2     No current facility-administered medications on file prior to visit.     Allergies   Allergen Reactions    Bee Venom Anaphylaxis    Dust Mite Extract     Gabapentin Dizziness     Annotation - 22Jan2015: \"felt like out of body experience\"    Molds & Smuts Sneezing    Other      Annotation - 30Jun2013: " "seasonal, Trees    Pollen Extract     Bernard Grass Pollen Allergen     Diclofenac Rash     tolerates naproxen    Latex Rash        Objective     /74   Pulse 78   Ht 5' 4\" (1.626 m)   Wt 85.3 kg (188 lb 1.6 oz)   BMI 32.29 kg/m²      PE:  AAOx 3  WDWN  Hearing intact, no drainage from eyes  no audible wheezing  no abdominal distension  LE compartments soft, skin intact    Ortho Exam:  left Knee:   No erythema  Mild swelling  no warmth  AROM: 0-110  Mild TTP over pes anserine bursa    Large joint arthrocentesis: L knee  Universal Protocol:  Consent: Verbal consent obtained.  Risks and benefits: risks, benefits and alternatives were discussed  Consent given by: patient  Site marked: the operative site was marked  Supporting Documentation  Indications: pain   Procedure Details  Location: knee - L knee  Preparation: Patient was prepped and draped in the usual sterile fashion  Needle size: 22 G  Ultrasound guidance: no  Approach: anterolateral  Medications administered: 2 mL ropivacaine 0.2 %; 40 mg triamcinolone acetonide 40 mg/mL    Patient tolerance: patient tolerated the procedure well with no immediate complications  Dressing:  Sterile dressing applied                   "

## 2024-07-23 NOTE — TELEPHONE ENCOUNTER
Knee and hip pain L side, 8/10, wants to return to Ortho. Can we write an updated Ortho referral? Pt has been seen at ortho w/in the last 12 mos. Please call patient back as she can be seen at  ortho at 8:45 this morning. She just wants to be sure she does not need the referral before she can be seen.

## 2024-09-09 ENCOUNTER — OFFICE VISIT (OUTPATIENT)
Dept: FAMILY MEDICINE CLINIC | Facility: CLINIC | Age: 54
End: 2024-09-09
Payer: COMMERCIAL

## 2024-09-09 VITALS
DIASTOLIC BLOOD PRESSURE: 82 MMHG | BODY MASS INDEX: 32.1 KG/M2 | WEIGHT: 188 LBS | HEIGHT: 64 IN | TEMPERATURE: 97.7 F | HEART RATE: 102 BPM | OXYGEN SATURATION: 97 % | SYSTOLIC BLOOD PRESSURE: 122 MMHG

## 2024-09-09 DIAGNOSIS — Z13.6 SCREENING FOR HEART DISEASE: ICD-10-CM

## 2024-09-09 DIAGNOSIS — T14.8XXA BRUISING: Primary | ICD-10-CM

## 2024-09-09 PROCEDURE — 99213 OFFICE O/P EST LOW 20 MIN: CPT | Performed by: FAMILY MEDICINE

## 2024-09-09 RX ORDER — MULTIVIT-MIN/IRON FUM/FOLIC AC 7.5 MG-4
1 TABLET ORAL DAILY
COMMUNITY

## 2024-09-09 NOTE — PROGRESS NOTES
Ambulatory Visit  Name: Negin Tapia      : 1970      MRN: [id number]  Encounter Provider: Marie Sofia DO  Encounter Date: 2024   Encounter department: [facility]    Assessment & Plan   1. Bruising  Comments:  Appears clinically significant and no risk factors and recent normal CBC.  2. Screening for heart disease  Comments:  Discussed study as good screen for age and family history  Orders:  -     CT coronary calcium score; Future; Expected date: 2024  I reassured patient that her bruising seems to be normal.  No family history of coagulopathy or blood disorders.  Recent CBC with normal platelets etc.    Depression Screening and Follow-up Plan: Patient was screened for depression during today's encounter. They screened negative with a PHQ-2 score of 0.      History of Present Illness     Patient is a 53-year-old female here with some concerns about bruising.  She is not necessarily recalling that she is bumping into things or recalling being injured in those areas.    There is no family history of any kind of clotting or bleeding disorders.  Previous blood work with recent CBC from May with normal platelets  Chief Complaint   Patient presents with    Bleeding/Bruising     Has been bruising easily, has been ongoing x a few months and getting worse   Denies any injury states she does feel pain in area's before bruises appear      Review of Systems   Constitutional:  Negative for fever.   Respiratory:  Negative for shortness of breath.    Cardiovascular:  Negative for chest pain and leg swelling.   Neurological:  Negative for headaches.   Hematological:  Bruises/bleeds easily (seems to get pain before  bruise shows up).   We reviewed ED report from May.  Also seeing orthopedic, recent injection left knee with steroid.  Patient does take a baby aspirin today and this could be enough to make her bruise easily.  She is also doing joint health medications like Omega  "and conjoint which also can be known to cause some easy  bruisability    Objective     /82   Pulse 102   Temp 97.7 °F (36.5 °C) (Temporal)   Ht 5' 4\" (1.626 m)   Wt 85.3 kg (188 lb)   SpO2 97%   BMI 32.27 kg/m²     Physical Exam  Vitals reviewed.   Constitutional:       Appearance: Normal appearance.   Cardiovascular:      Rate and Rhythm: Normal rate and regular rhythm.   Pulmonary:      Effort: Pulmonary effort is normal.      Breath sounds: Normal breath sounds.   Abdominal:      Palpations: Abdomen is soft.   Skin:     Comments: No petechiae, no purpura.   bruising are in areas of likely minor bumps that patient may not been aware of.   Neurological:      Mental Status: She is alert.       Administrative Statements           "

## 2024-09-13 ENCOUNTER — TELEPHONE (OUTPATIENT)
Dept: OBGYN CLINIC | Facility: MEDICAL CENTER | Age: 54
End: 2024-09-13

## 2024-09-13 NOTE — TELEPHONE ENCOUNTER
Left message for Pt to please call the office. Unfortunately Provider will not be in the office in the morning on 9- and we will need to reschedule.

## 2024-10-02 ENCOUNTER — OFFICE VISIT (OUTPATIENT)
Dept: OBGYN CLINIC | Facility: MEDICAL CENTER | Age: 54
End: 2024-10-02
Payer: COMMERCIAL

## 2024-10-02 VITALS
HEIGHT: 64 IN | BODY MASS INDEX: 32.1 KG/M2 | DIASTOLIC BLOOD PRESSURE: 74 MMHG | WEIGHT: 188 LBS | HEART RATE: 70 BPM | SYSTOLIC BLOOD PRESSURE: 132 MMHG

## 2024-10-02 DIAGNOSIS — M17.12 PRIMARY OSTEOARTHRITIS OF LEFT KNEE: Primary | ICD-10-CM

## 2024-10-02 PROCEDURE — 99213 OFFICE O/P EST LOW 20 MIN: CPT | Performed by: ORTHOPAEDIC SURGERY

## 2024-10-02 NOTE — PROGRESS NOTES
Assessment & Plan     1. Primary osteoarthritis of left knee      No orders of the defined types were placed in this encounter.        Patient has severe left knee patellofemoral arthritis.   Non operative treatment options were reviewed with patient today.   Injections: Patient received significant relief from the most recent knee cortisone injection. Patient wishes to avoid gel injections at this time and agrees to left knee cortisone injection in two weeks once she is officially three months s/p last cortisone injection.   Medications: Tylenol up to 3000 mg per day and OTC NSAID prn pain  PT: Continue home exercises.   Activity: Continue activity as tolerated.   Plan for next appt:  Left knee cortisone injection      Return in about 2 weeks (around 10/16/2024).    I answered all of the patient's questions during the visit and provided education of the patient's condition during the visit.  The patient verbalized understanding of the information given and agrees with the plan.  This note was dictated using [employer] software.  It may contain errors including improperly dictated words.  Please contact physician directly for any questions.    History of Present Illness   Chief complaint:   Chief Complaint   Patient presents with    Left Knee - Follow-up       HPI: Negin Tapia is a 54 y.o. female that c/o left knee pain. Patient was last seen in the office on 7/23/3034 where patient received a left knee cortisone injection. Patient reports she received significant relief from the cortisone injection. Patient states pain came back about one week ago.     Mechanism of Injury: none  Pain Description: Patient reports pain is behind her kneecap as well   Palliating Factors: rest   Provoking Factors: walking/standing  Associated Symptoms: Denies locking/catching and numbness/tingling.   Medications: None  Able to take NSAIDs? If not, why: Yes  Physical Therapy or Home Exercises: Doing home PT exercises.   Injections:  Patient received a left knee cortisone injection on 7/23/2024.   Bracing: none  Previous Surgery: none  Miscellaneous: N/A     ROS:    See HPI for musculoskeletal review.   All other systems reviewed are negative     Historical Information   Past Medical History:   Diagnosis Date    Cancer (HCC)     Thyroid    Cervical neuritis 05/16/2013    C7- T1 Left, Last assessed    Disease of thyroid gland     Endometriosis, cervix     GERD (gastroesophageal reflux disease)     Herpes     Hiatal hernia     R INGUINAL    Hypothyroidism     Left bundle branch block     Ovarian cyst     Precordial chest pain     Thyroid cancer (HCC)     THYROID     Past Surgical History:   Procedure Laterality Date    CHOLECYSTECTOMY  05/2020    COLONOSCOPY  12/2015 2015  Anastasiya. Normal exam, hemorrhoids.    COLONOSCOPY  2010    Dr. Langford.  Normal exam, hemorrhoids.    COLONOSCOPY  1996    Dr. Gonzalez.  Normal exam    HERNIA REPAIR      HYSTERECTOMY  2005    has ovaries    DC LAPAROSCOPY SURG CHOLECYSTECTOMY N/A 05/23/2019    Procedure: LAP. ZANE., IOCG;  Surgeon: Maya Harrison MD;  Location: [facility];  Service: General    THYROIDECTOMY      early 2008 [facility]    TUBAL LIGATION       Social History   Social History     Substance and Sexual Activity   Alcohol Use Not Currently    Alcohol/week: 1.0 standard drink of alcohol    Types: 1 Glasses of wine per week    Comment: socially     Social History     Substance and Sexual Activity   Drug Use No     Social History     Tobacco Use   Smoking Status Never   Smokeless Tobacco Never     Family History:   Family History   Problem Relation Age of Onset    Thyroid disease Mother     Cancer Father     Colon polyps Father     Colon cancer Father     Arthritis Sister     Thyroid disease Sister     Thyroid disease Brother     Glaucoma Brother     Coronary artery disease Maternal Grandmother     Diabetes Maternal Grandmother     Heart disease Maternal Grandmother     No Known Problems Maternal  "Grandfather     No Known Problems Paternal Grandmother     No Known Problems Paternal Grandfather        Current Outpatient Medications on File Prior to Visit   Medication Sig Dispense Refill    Ascorbic Acid (VITAMIN C GUMMIE PO) Take by mouth daily      aspirin 81 mg chewable tablet Chew 1 tablet (81 mg total) daily      Calcium Citrate 200 MG TABS One tablet daily      cholecalciferol (VITAMIN D3) 1,000 units tablet Take 2,000 Units by mouth daily       Glucos-Chondroit-Collag-Hyal (GLUCOSAMINE CHONDROIT-COLLAGEN PO)       levothyroxine 88 mcg tablet 100 mcg      Multiple Vitamins-Minerals (multivitamin with minerals) tablet Take 1 tablet by mouth daily      Turmeric 500 MG CAPS Take 1 tablet by mouth daily       valACYclovir (VALTREX) 1,000 mg tablet Take 1 tablet (1,000 mg total) by mouth 2 (two) times a day for 6 days 4 tablet 2     No current facility-administered medications on file prior to visit.     Allergies   Allergen Reactions    Bee Venom Anaphylaxis    Dust Mite Extract     Gabapentin Dizziness     Annotation - 22Jan2015: \"felt like out of body experience\"    Molds & Smuts Sneezing    Other      Annotation - [id number]: seasonal, Trees    Pollen Extract     Bernard Grass Pollen Allergen     Diclofenac Rash     tolerates naproxen    Latex Rash       Current Outpatient Medications on File Prior to Visit   Medication Sig Dispense Refill    Ascorbic Acid (VITAMIN C GUMMIE PO) Take by mouth daily      aspirin 81 mg chewable tablet Chew 1 tablet (81 mg total) daily      Calcium Citrate 200 MG TABS One tablet daily      cholecalciferol (VITAMIN D3) 1,000 units tablet Take 2,000 Units by mouth daily       Glucos-Chondroit-Collag-Hyal (GLUCOSAMINE CHONDROIT-COLLAGEN PO)       levothyroxine 88 mcg tablet 100 mcg      Multiple Vitamins-Minerals (multivitamin with minerals) tablet Take 1 tablet by mouth daily      Turmeric 500 MG CAPS Take 1 tablet by mouth daily       valACYclovir (VALTREX) 1,000 mg tablet Take " 1 tablet (1,000 mg total) by mouth 2 (two) times a day for 6 days 4 tablet 2     No current facility-administered medications on file prior to visit.       Objective   Vitals: There were no vitals taken for this visit.,There is no height or weight on file to calculate BMI.    PE:  AAOx 3  WDWN  Hearing intact, no drainage from eyes  Regular rate  no audible wheezing  no abdominal distension  LE compartments soft, skin intact    leftknee:    Appearance:  No  swelling   No  ecchymosis  No  obvious joint deformity   No  effusion   Palpation/Tenderness:  Yes  TTP over medial joint line  Yes  TTP over lateral joint line   No  TTP over patella  No  TTP over patellar tendon  Minimal TTP over pes anserine bursa  Active Range of Motion:  AROM: 0 to 110      Imaging Studies:  No new imaging today.       Procedures  No new procedures today.     Scribe Attestation      I,:  Michell Davis PA-C am acting as a scribe while in the presence of the attending physician.:       I,:  Erica Rowland DO personally performed the services described in this documentation    as scribed in my presence.:

## 2024-10-25 ENCOUNTER — OFFICE VISIT (OUTPATIENT)
Dept: OBGYN CLINIC | Facility: MEDICAL CENTER | Age: 54
End: 2024-10-25
Payer: COMMERCIAL

## 2024-10-25 VITALS
HEART RATE: 78 BPM | WEIGHT: 188.2 LBS | BODY MASS INDEX: 32.13 KG/M2 | SYSTOLIC BLOOD PRESSURE: 127 MMHG | HEIGHT: 64 IN | DIASTOLIC BLOOD PRESSURE: 72 MMHG

## 2024-10-25 DIAGNOSIS — M17.12 PRIMARY OSTEOARTHRITIS OF LEFT KNEE: Primary | ICD-10-CM

## 2024-10-25 DIAGNOSIS — M70.62 GREATER TROCHANTERIC BURSITIS OF LEFT HIP: ICD-10-CM

## 2024-10-25 PROCEDURE — 20610 DRAIN/INJ JOINT/BURSA W/O US: CPT | Performed by: ORTHOPAEDIC SURGERY

## 2024-10-25 PROCEDURE — 99213 OFFICE O/P EST LOW 20 MIN: CPT | Performed by: ORTHOPAEDIC SURGERY

## 2024-10-25 RX ORDER — TRIAMCINOLONE ACETONIDE 40 MG/ML
40 INJECTION, SUSPENSION INTRA-ARTICULAR; INTRAMUSCULAR
Status: COMPLETED | OUTPATIENT
Start: 2024-10-25 | End: 2024-10-25

## 2024-10-25 RX ORDER — BUPIVACAINE HYDROCHLORIDE 2.5 MG/ML
2 INJECTION, SOLUTION INFILTRATION; PERINEURAL
Status: COMPLETED | OUTPATIENT
Start: 2024-10-25 | End: 2024-10-25

## 2024-10-25 RX ADMIN — BUPIVACAINE HYDROCHLORIDE 2 ML: 2.5 INJECTION, SOLUTION INFILTRATION; PERINEURAL at 10:45

## 2024-10-25 RX ADMIN — TRIAMCINOLONE ACETONIDE 40 MG: 40 INJECTION, SUSPENSION INTRA-ARTICULAR; INTRAMUSCULAR at 10:45

## 2024-10-25 NOTE — PROGRESS NOTES
Assessment/Plan:  1. Primary osteoarthritis of left knee    2. Greater trochanteric bursitis of left hip      Orders Placed This Encounter   Procedures    Large joint arthrocentesis: L knee    Large joint arthrocentesis: L greater trochanteric bursa     Patient severe left knee patellofemoral osteoarthritis.  After a discussion of risks and benefits the patient elected to proceed with a left knee steroid injection today.  Patient should ice and avoid strenuous activity for 1-2 days if needed.  Patient should avoid vaccines for 2 weeks if possible.  If patient is diabetic should also monitor glucose over the next 7 to 10 days.    Continue Tylenol 1,000mg by mouth every 8 hours as needed for pain.  Do not exceed 3,000mg per day.    Continue HEP  Continue activity as tolerated  Patient will follow-up in 3 months where she will be eligible for left knee CSI.    Patient is also experiencing left greater trochanteric bursitis.  After a discussion of risks and benefits the patient elected to proceed with a left greater trochanteric bursa steroid injection today.  Patient should ice and avoid strenuous activity for 1-2 days if needed.  Patient should avoid vaccines for 2 weeks if possible.  If patient is diabetic should also monitor glucose over the next 7 to 10 days.    Patient knows that she may follow-up in 2 months if she would like to repeat left greater trochanter bursa steroid injection.    Return in about 3 months (around 1/25/2025) for left knee CSI .    I answered all of the patient's questions during the visit and provided education of the patient's condition during the visit.  The patient verbalized understanding of the information given and agrees with the plan.  This note was dictated using [employer] software.  It may contain errors including improperly dictated words.  Please contact physician directly for any questions.    Subjective   Chief Complaint:   Chief Complaint   Patient presents with    Left Knee -  Follow-up       [facility]  Negin Tapia is a 54 y.o. female who presents for follow up for severe left knee patellofemoral osteoarthritis.  Patient was last seen 10/2/2024 patient was still receiving symptomatic relief from 7/23/2024 however pain started to return.  Patient states today that she continues to have pain within the lateral aspect of her left knee with weightbearing activities.  Patient has been taking Tylenol and/or Aleve as needed for pain control.      Review of Systems  ROS:    See [facility] for musculoskeletal review.   All other systems reviewed are negative     History:  Past Medical History:   Diagnosis Date    Cancer (HCC)     Thyroid    Cervical neuritis 05/16/2013    C7- T1 Left, Last assessed    Disease of thyroid gland     Endometriosis, cervix     GERD (gastroesophageal reflux disease)     Herpes     Hiatal hernia     R INGUINAL    Hypothyroidism     Left bundle branch block     Ovarian cyst     Precordial chest pain     Thyroid cancer (HCC)     THYROID     Past Surgical History:   Procedure Laterality Date    CHOLECYSTECTOMY  05/2020    COLONOSCOPY  12/2015 2015  Anastasiya. Normal exam, hemorrhoids.    COLONOSCOPY  2010    Dr. Langford.  Normal exam, hemorrhoids.    COLONOSCOPY  1996    Dr. Gonzalez.  Normal exam    HERNIA REPAIR      HYSTERECTOMY  2005    has ovaries    MD LAPAROSCOPY SURG CHOLECYSTECTOMY N/A 05/23/2019    Procedure: LAP. ZANE., IOCG;  Surgeon: Maya Harrison MD;  Location: [facility];  Service: General    THYROIDECTOMY      early 2008 [facility]    TUBAL LIGATION       Social History   Social History     Substance and Sexual Activity   Alcohol Use Not Currently    Alcohol/week: 1.0 standard drink of alcohol    Types: 1 Glasses of wine per week    Comment: socially     Social History     Substance and Sexual Activity   Drug Use No     Social History     Tobacco Use   Smoking Status Never   Smokeless Tobacco Never     Family History:   Family History   Problem Relation Age of Onset  "   Thyroid disease Mother     Cancer Father     Colon polyps Father     Colon cancer Father     Arthritis Sister     Thyroid disease Sister     Thyroid disease Brother     Glaucoma Brother     Coronary artery disease Maternal Grandmother     Diabetes Maternal Grandmother     Heart disease Maternal Grandmother     No Known Problems Maternal Grandfather     No Known Problems Paternal Grandmother     No Known Problems Paternal Grandfather        Current Outpatient Medications on File Prior to Visit   Medication Sig Dispense Refill    Ascorbic Acid (VITAMIN C GUMMIE PO) Take by mouth daily      aspirin 81 mg chewable tablet Chew 1 tablet (81 mg total) daily      Calcium Citrate 200 MG TABS One tablet daily      cholecalciferol (VITAMIN D3) 1,000 units tablet Take 2,000 Units by mouth daily       Glucos-Chondroit-Collag-Hyal (GLUCOSAMINE CHONDROIT-COLLAGEN PO)       levothyroxine 88 mcg tablet 100 mcg      Multiple Vitamins-Minerals (multivitamin with minerals) tablet Take 1 tablet by mouth daily      Turmeric 500 MG CAPS Take 1 tablet by mouth daily       valACYclovir (VALTREX) 1,000 mg tablet Take 1 tablet (1,000 mg total) by mouth 2 (two) times a day for 6 days 4 tablet 2     No current facility-administered medications on file prior to visit.     Allergies   Allergen Reactions    Bee Venom Anaphylaxis    Dust Mite Extract     Gabapentin Dizziness     Annotation - 22Jan2015: \"felt like out of body experience\"    Molds & Smuts Sneezing    Other      Annotation - 30Jun2013: seasonal, Trees    Pollen Extract     Bernard Grass Pollen Allergen     Diclofenac Rash     tolerates naproxen    Latex Rash        Objective     /72   Pulse 78   Ht 5' 4\" (1.626 m)   Wt 85.4 kg (188 lb 3.2 oz)   BMI 32.30 kg/m²      PE:  AAOx 3  WDWN  Hearing intact, no drainage from eyes  no audible wheezing  no abdominal distension  LE compartments soft, skin intact    Ortho Exam:  left Knee:   No erythema  no swelling  no effusion  no " warmth  AROM: 0-120  TTP lateral patella  Stable to varus/valgus stress    left Hip Exam  Palpation:     + TTP over greater trochanter   - TTP over SIJ  ROM:  Normal hip ROM.  Tests:  (+) TIANA. (-) [place].  NV: sensation grossly intact L4, L5, S1, palpable pedal pulse        Large joint arthrocentesis: L knee  Universal Protocol:  procedure performed by consultantConsent: Verbal consent obtained.  Risks and benefits: risks, benefits and alternatives were discussed  Consent given by: patient  Patient understanding: patient states understanding of the procedure being performed  Site marked: the operative site was marked  Patient identity confirmed: verbally with patient  Supporting Documentation  Indications: pain   Procedure Details  Location: knee - L knee  Needle size: 22 G  Ultrasound guidance: no  Approach: anterolateral  Medications administered: 2 mL bupivacaine 0.25 %; 40 mg triamcinolone acetonide 40 mg/mL    Patient tolerance: patient tolerated the procedure well with no immediate complications  Dressing:  Sterile dressing applied      Large joint arthrocentesis: L greater trochanteric bursa  Universal Protocol:  procedure performed by consultantConsent: Verbal consent obtained.  Risks and benefits: risks, benefits and alternatives were discussed  Consent given by: patient  Patient understanding: patient states understanding of the procedure being performed  Site marked: the operative site was marked  Patient identity confirmed: verbally with patient  Supporting Documentation  Indications: pain   Procedure Details  Location: hip - L greater trochanteric bursa  Preparation: Patient was prepped and draped in the usual sterile fashion  Needle size: 22 G  Ultrasound guidance: no  Approach: lateral  Medications administered: 2 mL bupivacaine 0.25 %; 40 mg triamcinolone acetonide 40 mg/mL    Patient tolerance: patient tolerated the procedure well with no immediate complications  Dressing:  Sterile dressing  applied          Scribe Attestation      I,:  Yariel Sims am acting as a scribe while in the presence of the attending physician.:       I,:  Erica Rowland,  personally performed the services described in this documentation    as scribed in my presence.:

## 2024-12-31 ENCOUNTER — HOSPITAL ENCOUNTER (OUTPATIENT)
Dept: CT IMAGING | Facility: HOSPITAL | Age: 54
Discharge: HOME | End: 2024-12-31
Payer: COMMERCIAL

## 2024-12-31 DIAGNOSIS — Z13.6 SCREENING FOR HEART DISEASE: ICD-10-CM

## 2024-12-31 PROCEDURE — 75571 CT HRT W/O DYE W/CA TEST: CPT

## 2025-01-06 ENCOUNTER — RESULTS FOLLOW-UP (OUTPATIENT)
Dept: FAMILY MEDICINE CLINIC | Facility: CLINIC | Age: 55
End: 2025-01-06

## 2025-01-24 ENCOUNTER — OFFICE VISIT (OUTPATIENT)
Dept: OBGYN CLINIC | Facility: MEDICAL CENTER | Age: 55
End: 2025-01-24
Payer: COMMERCIAL

## 2025-01-24 VITALS — BODY MASS INDEX: 32.58 KG/M2 | HEIGHT: 64 IN | WEIGHT: 190.8 LBS

## 2025-01-24 DIAGNOSIS — M70.62 GREATER TROCHANTERIC BURSITIS OF LEFT HIP: ICD-10-CM

## 2025-01-24 DIAGNOSIS — M16.12 PRIMARY OSTEOARTHRITIS OF ONE HIP, LEFT: ICD-10-CM

## 2025-01-24 DIAGNOSIS — M17.12 PRIMARY OSTEOARTHRITIS OF LEFT KNEE: Primary | ICD-10-CM

## 2025-01-24 PROCEDURE — 99213 OFFICE O/P EST LOW 20 MIN: CPT | Performed by: ORTHOPAEDIC SURGERY

## 2025-01-24 PROCEDURE — 20610 DRAIN/INJ JOINT/BURSA W/O US: CPT | Performed by: ORTHOPAEDIC SURGERY

## 2025-01-24 RX ORDER — BUPIVACAINE HYDROCHLORIDE 2.5 MG/ML
2 INJECTION, SOLUTION INFILTRATION; PERINEURAL
Status: COMPLETED | OUTPATIENT
Start: 2025-01-24 | End: 2025-01-24

## 2025-01-24 RX ORDER — TRIAMCINOLONE ACETONIDE 40 MG/ML
40 INJECTION, SUSPENSION INTRA-ARTICULAR; INTRAMUSCULAR
Status: COMPLETED | OUTPATIENT
Start: 2025-01-24 | End: 2025-01-24

## 2025-01-24 RX ADMIN — BUPIVACAINE HYDROCHLORIDE 2 ML: 2.5 INJECTION, SOLUTION INFILTRATION; PERINEURAL at 08:45

## 2025-01-24 RX ADMIN — TRIAMCINOLONE ACETONIDE 40 MG: 40 INJECTION, SUSPENSION INTRA-ARTICULAR; INTRAMUSCULAR at 08:45

## 2025-01-24 NOTE — PROGRESS NOTES
Assessment/Plan:  1. Primary osteoarthritis of left knee    2. Greater trochanteric bursitis of left hip    3. Primary osteoarthritis of one hip, left        Orders Placed This Encounter   Procedures    Large joint arthrocentesis: L knee    Ambulatory Referral to Physical Therapy       Patient moderate left knee patellofemoral osteoarthritis.  After a discussion of risks and benefits the patient elected to proceed with a left knee steroid injection today.  Patient should ice and avoid strenuous activity for 1-2 days if needed.  Patient should avoid vaccines for 2 weeks if possible.  If patient is diabetic should also monitor glucose over the next 7 to 10 days.    Continue Tylenol 1,000mg by mouth every 8 hours as needed for pain.  Do not exceed 3,000mg per day.    Continue HEP, New referral to physical therapy was placed at time of visit  Continue activity as tolerated  Patient will follow-up in 3 months where she will be eligible for left knee CSI.  We will obtain new XR of left knee     Patient is also experiencing left greater trochanteric bursitis, pain 2/10  After a discussion of risks and benefits the patient elected to decline a left greater trochanteric bursa steroid injection today.    Referral to physical therapy was placed at time of visit for left greater trochanteric bursitis, left IT band syndrome and left hip osteoarthritis  Patient will follow-up in 3 months for re-evaluation of current symptoms    Patient has mild osteoarthritis of left hip with pain over the anterolateral aspect of the hip  Patient will begin physical therapy exercises with new referral placed at time of visit    Return in about 3 months (around 4/24/2025) for Recheck.    I answered all of the patient's questions during the visit and provided education of the patient's condition during the visit.  The patient verbalized understanding of the information given and agrees with the plan.  This note was dictated using [employer] software.   It may contain errors including improperly dictated words.  Please contact physician directly for any questions.    Subjective   Chief Complaint:   Chief Complaint   Patient presents with    Left Knee - Follow-up       HPI  Negin Tapia is a 54 y.o. female who presents for follow up evaluation regarding severe left knee patellofemoral osteoarthritis, and left greater trochanteric bursitis.  Patient was last seen 10/25/2024  where she received left knee and left greater trochanteric bursitis. Patient states today that she continues to have pain within the lateral aspect, and anterior aspect of the knee over the kneecap of her left knee with weightbearing activities.  Patient states that she is also having pain in the left anterolateral aspect of the hip.  She states that when she palpates she feels a lump on the left side and she was unsure if something else was going on.  She states that her hip bothers her more when her knee is having a bad day.  Patient has been taking Tylenol as needed for pain control.       Review of Systems  ROS:    See [facility] for musculoskeletal review.   All other systems reviewed are negative     History:  Past Medical History:   Diagnosis Date    Cancer (HCC)     Thyroid    Cervical neuritis 05/16/2013    C7- T1 Left, Last assessed    Disease of thyroid gland     Endometriosis, cervix     GERD (gastroesophageal reflux disease)     Herpes     Hiatal hernia     R INGUINAL    Hypothyroidism     Left bundle branch block     Ovarian cyst     Precordial chest pain 5/18/2023    Thyroid cancer (HCC)     THYROID     Past Surgical History:   Procedure Laterality Date    CHOLECYSTECTOMY  05/2020    COLONOSCOPY  12/2015 2015  Anastasiya. Normal exam, hemorrhoids.    COLONOSCOPY  2010    Dr. Langford.  Normal exam, hemorrhoids.    COLONOSCOPY  1996    Dr. Gonzalez.  Normal exam    HERNIA REPAIR      HYSTERECTOMY  2005    has ovaries    MT LAPAROSCOPY SURG CHOLECYSTECTOMY N/A 05/23/2019    Procedure: LAP.  CHARLES REDDYIRISH;  Surgeon: Maya Harrison MD;  Location: [facility] OR;  Service: General    THYROIDECTOMY      early 2008 [facility]    TUBAL LIGATION       Social History   Social History     Substance and Sexual Activity   Alcohol Use Not Currently    Alcohol/week: 1.0 standard drink of alcohol    Types: 1 Glasses of wine per week    Comment: socially     Social History     Substance and Sexual Activity   Drug Use No     Social History     Tobacco Use   Smoking Status Never   Smokeless Tobacco Never     Family History:   Family History   Problem Relation Age of Onset    Thyroid disease Mother     Cancer Father     Colon polyps Father     Colon cancer Father     Arthritis Sister     Thyroid disease Sister     Thyroid disease Brother     Glaucoma Brother     Coronary artery disease Maternal Grandmother     Diabetes Maternal Grandmother     Heart disease Maternal Grandmother     No Known Problems Maternal Grandfather     No Known Problems Paternal Grandmother     No Known Problems Paternal Grandfather        Current Outpatient Medications on File Prior to Visit   Medication Sig Dispense Refill    Ascorbic Acid (VITAMIN C GUMMIE PO) Take by mouth daily      aspirin 81 mg chewable tablet Chew 1 tablet (81 mg total) daily      Calcium Citrate 200 MG TABS One tablet daily      cholecalciferol (VITAMIN D3) 1,000 units tablet Take 2,000 Units by mouth daily       Glucos-Chondroit-Collag-Hyal (GLUCOSAMINE CHONDROIT-COLLAGEN PO)       levothyroxine 88 mcg tablet 100 mcg      Multiple Vitamins-Minerals (multivitamin with minerals) tablet Take 1 tablet by mouth daily      Turmeric 500 MG CAPS Take 1 tablet by mouth daily       valACYclovir (VALTREX) 1,000 mg tablet Take 1 tablet (1,000 mg total) by mouth 2 (two) times a day for 6 days 4 tablet 2     No current facility-administered medications on file prior to visit.     Allergies   Allergen Reactions    Bee Venom Anaphylaxis    Dust Mite Extract     Gabapentin Dizziness      "Annotation - 22Jan2015: \"felt like out of body experience\"    Molds & Smuts Sneezing    Other      Annotation - 30Jun2013: seasonal, Trees    Pollen Extract     Bernard Grass Pollen Allergen     Diclofenac Rash     tolerates naproxen    Latex Rash        Objective     Ht 5' 4\" (1.626 m)   Wt 86.5 kg (190 lb 12.8 oz)   BMI 32.75 kg/m²      PE:  AAOx 3  WDWN  Hearing intact, no drainage from eyes  no audible wheezing  no abdominal distension  LE compartments soft, skin intact    Ortho Exam:  left Knee:   No erythema  no swelling  no effusion  no warmth  AROM: 0-120  TTP lateral patella  Stable to varus/valgus stress    left Hip Exam  Palpation:     + TTP over greater trochanter   - TTP over SIJ  ROM:  Normal hip ROM.  Tests:  (+) TIANA. (-) [place].  NV: sensation grossly intact L4, L5, S1, palpable pedal pulse      Large joint arthrocentesis: L knee  Universal Protocol:  procedure performed by consultantConsent: Verbal consent obtained.  Risks and benefits: risks, benefits and alternatives were discussed  Consent given by: patient  Patient understanding: patient states understanding of the procedure being performed  Patient consent: the patient's understanding of the procedure matches consent given  Site marked: the operative site was marked  Patient identity confirmed: verbally with patient  Supporting Documentation  Indications: pain and joint swelling   Procedure Details  Location: knee - L knee  Preparation: Patient was prepped and draped in the usual sterile fashion  Needle size: 22 G  Ultrasound guidance: no  Approach: anterolateral  Medications administered: 2 mL bupivacaine 0.25 %; 40 mg triamcinolone acetonide 40 mg/mL    Patient tolerance: patient tolerated the procedure well with no immediate complications  Dressing:  Sterile dressing applied              Scribe Attestation      I,:  Bella Granado am acting as a scribe while in the presence of the attending physician.:       I,:  Erica Newell " DO Kashif personally performed the services described in this documentation    as scribed in my presence.:

## 2025-01-29 DIAGNOSIS — B00.1 COLD SORE: ICD-10-CM

## 2025-01-30 RX ORDER — VALACYCLOVIR HYDROCHLORIDE 1 G/1
1000 TABLET, FILM COATED ORAL 2 TIMES DAILY
Qty: 12 TABLET | Refills: 2 | Status: SHIPPED | OUTPATIENT
Start: 2025-01-30 | End: 2025-02-05

## 2025-02-10 ENCOUNTER — TRANSCRIBE ORDERS (OUTPATIENT)
Dept: VASCULAR SURGERY | Facility: HOSPITAL | Age: 55
End: 2025-02-10

## 2025-02-10 ENCOUNTER — TELEPHONE (OUTPATIENT)
Dept: VASCULAR SURGERY | Facility: HOSPITAL | Age: 55
End: 2025-02-10

## 2025-02-10 DIAGNOSIS — I77.3 FIBROMUSCULAR DYSPLASIA OF BOTH CAROTID ARTERIES (HCC): Primary | ICD-10-CM

## 2025-02-10 NOTE — TELEPHONE ENCOUNTER
Attempted to contact patient to schedule appointment(s) listed below.  Requested patient call [phone] to schedule appointment(s).    Patient's appointment(s) are due on or after 3/22/25 .    Dopplers  [] Abdominal Aorta Iliac (AOIL)  [x] Carotid (CV)   [] Celiac and/or Mesenteric  [] Endovascular Aortic Repair (EVAR)   [] Hemodialysis Access (HD)   [] Lower Limb Arterial (ABIGAIL)  [] Lower Limb Venous (LEV)  [] Lower Limb Venous Duplex with Reflux (LEVDR)  [] Renal Artery  [] Upper Limb Arterial (UEA)    [] Upper Limb Venous (UEV)              [] LYNN and Waveform analysis     Advanced Imaging   [] CTA head/neck    [] CTA abdomen    [] CTA abdomen & pelvis    [] CT abdomen with/ without contrast  [] CT abdomen with contrast  [] CT abdomen without contrast    [] CT abdomen & pelvis with/ without contrast  [] CT abdomen & pelvis with contrast  [] CT abdomen & pelvis without contrast    Office Visit   [] New patient, patient last seen over 3 years ago  [x] Risk factor modification (RFM)   [] Follow up   [] Lost to follow up (LTFU)   Pt needs CV scheduled and then an OV with [facility] after. L/S 8/17/23 [facility]JQ

## 2025-02-17 ENCOUNTER — EVALUATION (OUTPATIENT)
Dept: PHYSICAL THERAPY | Facility: MEDICAL CENTER | Age: 55
End: 2025-02-17
Payer: COMMERCIAL

## 2025-02-17 DIAGNOSIS — M17.12 PRIMARY OSTEOARTHRITIS OF LEFT KNEE: Primary | ICD-10-CM

## 2025-02-17 DIAGNOSIS — M70.62 GREATER TROCHANTERIC BURSITIS OF LEFT HIP: ICD-10-CM

## 2025-02-17 PROCEDURE — 97110 THERAPEUTIC EXERCISES: CPT | Performed by: PHYSICAL THERAPIST

## 2025-02-17 PROCEDURE — 97161 PT EVAL LOW COMPLEX 20 MIN: CPT | Performed by: PHYSICAL THERAPIST

## 2025-02-17 NOTE — PROGRESS NOTES
PT Evaluation     Today's date: 2025  Patient name: Negin Tapia  : 1970  MRN: [id number]  Referring provider: Erica Rowland,*  Dx:   Encounter Diagnoses   Name Primary?    Primary osteoarthritis of left knee     Greater trochanteric bursitis of left hip                   Assessment  Impairments: abnormal coordination, abnormal muscle firing, abnormal or restricted ROM, activity intolerance, impaired balance, impaired physical strength, lacks appropriate home exercise program and pain with function    Assessment details: Negin Tapia is a 53 y/o female who presents with complaints of chronic left hip and knee pain.  The patient's greatest concern is pain c/ daily activity.  Primary movement impairment diagnosis of pelvic malalignment and hip accessory weakness, resulting in pathoanatomical symptoms of primary osteoarthritis of left knee and greater trochanteric bursitis of left hip, which limits her ability to perform functional activities without pain.  Pt. will benefit from skilled PT services that includes manual therapy techniques to enhance tissue extensibility, neuromuscular re-education to facilitate motor control, therapeutic exercise to increase functional mobility, and modalities prn to reduce pain and inflammation.   Prognosis details: Prognosis given patient compliance to HEP and POC.     Goals  Impairment Goals  - Pt I with initial HEP in 1-2 visits  - Improve ROM to WNL in 4-6 weeks  - Increase strength to 5/5 in all affected areas in 4-6 weeks    Functional Goals  - Increase Functional Status Measure to: goal status in 6-8 weeks  - Patient will be independent with comprehensive HEP in 6-8 weeks  - Ambulation is improved to prior level of function in 6-8 weeks  - Stair climbing is improved to prior level of function in 6-8 weeks  - Squatting is improved to prior level of function in 6-8 weeks       Plan  Patient would benefit from: PT eval  Planned modality  interventions: thermotherapy: hydrocollator packs    Planned therapy interventions: joint mobilization, manual therapy, neuromuscular re-education, strengthening, stretching, therapeutic activities, therapeutic exercise, home exercise program, graded exercise, graded activity, flexibility, abdominal trunk stabilization, balance/weight bearing training and balance    Frequency: 1-2x/week.  Therapy duration (weeks): 6-8 weeks.  Treatment plan discussed with: patient  Subjective Evaluation    History of Present Illness  Mechanism of injury: Patient states that she has been experiencing chronic left hip and knee pain since .  She denies a specific STEFFANY and LE paresthesias.  Patient followed up c/ Dr. Rowland and had CSIs with relief.  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  Walking is bothersome and negotiating stairs is bothersome.  Patient notes that she can walk about 1 mile without pain.  She would like to learn exercises in order to have decreased pain for increased functional mobility.          Patient Goals  Patient goals for therapy: decreased pain and increased strength  Patient goal: Learn exercises to help derease the pain.  Pain  Current pain ratin  At best pain ratin  At worst pain ratin  Quality: dull ache  Relieving factors: heat and rest (Tylenol)  Aggravating factors: walking    Treatments  Previous treatment: physical therapy and injection treatment (virtual physical therapy)  Current treatment: physical therapy    Objective     Palpation   Left   Hypertonic in the TFL.   Tenderness of the TFL.     Tenderness     Left Hip   Tenderness in the greater trochanter.     Additional Tenderness Details  (+) TTP t/o L ITB    Lumbar Screen  Lumbar range of motion within normal limits with the following exceptions:Limited lumbar ROM throughout.    Neurological Testing     Sensation     Hip   Left Hip   Intact: light touch    Right Hip   Intact: light touch    Additional  Neurological Details  Reflexes NT.    Active Range of Motion   Left Knee   Normal active range of motion    Right Knee   Normal active range of motion    Passive Range of Motion   Left Hip   Flexion: WFL and with pain  Extension: Left hip passive extension: Limited.   Abduction: WFL  External rotation (90/90): Left hip passive external rotation 90/90: Limited. with pain  Internal rotation (90/90): WFL    Right Hip   Normal passive range of motion    Mobility   Patellar Mobility:   Left Knee   WFL: medial, lateral, superior and inferior.     Right Knee   WFL: medial, lateral, superior and inferior    Patellar Static Positioning   Left Knee: WFL  Right Knee: WFL    Joint Play   Left Hip   Hypomobile in the long axis distraction.    Strength/Myotome Testing     Left Hip   Planes of Motion   Flexion: 4-  Extension: 3-  Abduction: 3-  Adduction: 4-    Isolated Muscles   Gluteus davis: 3-  Gluteus medius: 3-  TFL: 3+  Iliopsoas: 3+    Right Hip   Planes of Motion   Flexion: 5  Extension: 5  Abduction: 5  Adduction: 5    Isolated Muscles   Gluteus maximums: 5  Gluteus medius: 5  TFL: 5  Iliopsoas: 5    Tests     Left Hip   Positive long sit and Joie.   Negative TIANA, FADIR and scour.   90/90 SLR: Positive.     Left Knee   Negative anterior drawer, lateral Flakita, medial Flakita, posterior drawer, valgus stress test at 0 degrees, valgus stress test at 30 degrees, varus stress test at 0 degrees and varus stress test at 30 degrees.     Functional Assessment      Squat    Sitting toward right side.     Comments  Selective Functional Movement Assessment  (FN= Functional Non-painful) (FP= Functional Painful) (DP= Dysfunctional Painful) (DN= Dysfunctional Non-painful)     Cervical Flexion: FN    Cervical Extension:  FN    Cervical Rotation:   Right:  Left:    UE Pattern #1 (MRE):  Right:  FN  Left:  FN    UE Pattern #2 (LRF):  Right:  FN  Left:  FN    Multi-Segmental Flexion:  DN    Multi-Segmental Extension:   DN    Multi-Segmental Rotation:  Right:  DP  Left:  DN    Single Leg Stance (EO/EC):  Right:  FN  Left:  DN    Arms Down Deep Squat:  DP           Posterior weight shift: poor    Depth of femur height: above 90 degrees       Precautions:  N/A    Manuals 2/17                                                                Neuro Re-Ed             TA isometrics 2x10 5s                                                   Ther Ex                          Sup hip add 2x10 5s            Bridges 2x10 5s                         Sup hamstring str 3x30s            Prone quad str 3x30s            Standing hip flexor str 3x30s                         Ther Activity                                       Gait Training                                       Modalities             JOHNY Vazquez, PT  2/17/2025,8:15 AM

## 2025-02-21 ENCOUNTER — OFFICE VISIT (OUTPATIENT)
Dept: PHYSICAL THERAPY | Facility: MEDICAL CENTER | Age: 55
End: 2025-02-21
Payer: COMMERCIAL

## 2025-02-21 DIAGNOSIS — M17.12 PRIMARY OSTEOARTHRITIS OF LEFT KNEE: Primary | ICD-10-CM

## 2025-02-21 DIAGNOSIS — M70.62 GREATER TROCHANTERIC BURSITIS OF LEFT HIP: ICD-10-CM

## 2025-02-21 PROCEDURE — 97110 THERAPEUTIC EXERCISES: CPT | Performed by: PHYSICAL THERAPIST

## 2025-02-21 PROCEDURE — 97112 NEUROMUSCULAR REEDUCATION: CPT | Performed by: PHYSICAL THERAPIST

## 2025-02-21 PROCEDURE — 97140 MANUAL THERAPY 1/> REGIONS: CPT | Performed by: PHYSICAL THERAPIST

## 2025-02-21 NOTE — PROGRESS NOTES
Daily Note     Today's date: 2025  Patient name: Negin Tapia  : 1970  MRN: [id number]  Referring provider: Erica Rowland,*  Dx:   Encounter Diagnosis     ICD-10-CM    1. Primary osteoarthritis of left knee  M17.12       2. Greater trochanteric bursitis of left hip  M70.62                      Subjective: Patient states that she was a little sore after initial visit.      Objective: See treatment diary below.      Assessment:  Patient presents without pelvic malalignment.  She demonstrates good tolerance to tx without pain.  Tolerated treatment well. Patient would benefit from continued PT.      Plan: Continue per plan of care.      Precautions:  N/A    Manuals            STM/MFR  AZ                                                  Neuro Re-Ed             TA isometrics 2x10 5s 2x10 5s                                                  Ther Ex                          Sup hip add 2x10 5s 2x10 5s            Bridges 2x10 5s 2x10 5s                        Sup hamstring str 3x30s 3x30s           Prone quad str 3x30s 3x30s           Standing hip flexor str 3x30s 3x30s                        Ther Activity                                       Gait Training                                       Modalities               10'

## 2025-02-24 ENCOUNTER — APPOINTMENT (OUTPATIENT)
Dept: PHYSICAL THERAPY | Facility: MEDICAL CENTER | Age: 55
End: 2025-02-24
Payer: COMMERCIAL

## 2025-02-24 ENCOUNTER — TELEPHONE (OUTPATIENT)
Age: 55
End: 2025-02-24

## 2025-02-24 ENCOUNTER — OFFICE VISIT (OUTPATIENT)
Dept: FAMILY MEDICINE CLINIC | Facility: CLINIC | Age: 55
End: 2025-02-24
Payer: COMMERCIAL

## 2025-02-24 VITALS
DIASTOLIC BLOOD PRESSURE: 80 MMHG | WEIGHT: 190 LBS | BODY MASS INDEX: 32.44 KG/M2 | SYSTOLIC BLOOD PRESSURE: 120 MMHG | OXYGEN SATURATION: 97 % | TEMPERATURE: 98 F | HEIGHT: 64 IN | HEART RATE: 102 BPM

## 2025-02-24 DIAGNOSIS — J32.9 BACTERIAL SINUSITIS: ICD-10-CM

## 2025-02-24 DIAGNOSIS — J10.1 INFLUENZA A: Primary | ICD-10-CM

## 2025-02-24 DIAGNOSIS — R05.1 ACUTE COUGH: ICD-10-CM

## 2025-02-24 DIAGNOSIS — K12.1 MOUTH ULCER: ICD-10-CM

## 2025-02-24 DIAGNOSIS — B96.89 BACTERIAL SINUSITIS: ICD-10-CM

## 2025-02-24 LAB
SARS-COV-2 AG UPPER RESP QL IA: NEGATIVE
SL AMB POCT RAPID FLU A: ABNORMAL
SL AMB POCT RAPID FLU B: NEGATIVE
VALID CONTROL: NORMAL

## 2025-02-24 PROCEDURE — 87811 SARS-COV-2 COVID19 W/OPTIC: CPT | Performed by: NURSE PRACTITIONER

## 2025-02-24 PROCEDURE — 99214 OFFICE O/P EST MOD 30 MIN: CPT | Performed by: NURSE PRACTITIONER

## 2025-02-24 PROCEDURE — 87804 INFLUENZA ASSAY W/OPTIC: CPT | Performed by: NURSE PRACTITIONER

## 2025-02-24 RX ORDER — PREDNISONE 20 MG/1
40 TABLET ORAL DAILY
Qty: 6 TABLET | Refills: 0 | Status: SHIPPED | OUTPATIENT
Start: 2025-02-24 | End: 2025-02-27

## 2025-02-24 RX ORDER — BENZONATATE 100 MG/1
100 CAPSULE ORAL 3 TIMES DAILY PRN
Qty: 20 CAPSULE | Refills: 0 | Status: SHIPPED | OUTPATIENT
Start: 2025-02-24

## 2025-02-24 NOTE — PROGRESS NOTES
Name: Negin Tapia      : 1970      MRN: [id number]  Encounter Provider: PACO Marte  Encounter Date: 2025   Encounter department: [facility]  :  Assessment & Plan  Influenza A  See below. Out of antiviral window        Acute cough    Start cough medication, this is the Tessalon perles. One pill every 8 hours as needed for cough.     Start prednisone, this is the steroid. 40mg daily for 3 days. Take with food. It's better to take it earlier in the day than later as it could keep you up at night. Do not mix with NSAIDs such as aleve, ibuprofen, advil, motrin.   Orders:  •  benzonatate (TESSALON PERLES) 100 mg capsule; Take 1 capsule (100 mg total) by mouth 3 (three) times a day as needed for cough  •  predniSONE 20 mg tablet; Take 2 tablets (40 mg total) by mouth daily for 3 days  •  POCT Rapid Covid Ag  •  POCT rapid flu A and B    Bacterial sinusitis  Continue with antibiotics and nasal spray.     Tylenol okay to continue with as needed.   Orders:  •  predniSONE 20 mg tablet; Take 2 tablets (40 mg total) by mouth daily for 3 days    Mouth ulcer  Discussed with patient. Monitor closely. Dentist if no improvement/resolution.              History of Present Illness   Here for URI / sinusitis symptoms.   Seen at urgent care yesterday. Prescribed augmentin.   States cough is worse symptoms-coughing aggravates ribs/ shoulder.       Headache  Shoulder Pain   Pertinent negatives include no fever.     Review of Systems   Constitutional:  Positive for fatigue. Negative for chills and fever.   HENT:  Positive for congestion, sinus pressure and sinus pain.    Eyes:  Negative for discharge.   Respiratory:  Positive for cough. Negative for shortness of breath.    Cardiovascular:  Negative for chest pain.   Gastrointestinal:  Negative for constipation and diarrhea.   Genitourinary:  Negative for difficulty urinating.   Musculoskeletal:  Positive for arthralgias. Negative  "for joint swelling.   Skin:  Negative for rash.   Hematological:  Negative for adenopathy.   Psychiatric/Behavioral:  The patient is not nervous/anxious.        Objective   /80   Pulse 102   Temp 98 °F (36.7 °C)   Ht 5' 4\" (1.626 m)   Wt 86.2 kg (190 lb)   SpO2 97%   BMI 32.61 kg/m²      Physical Exam  Vitals and nursing note reviewed.   Constitutional:       General: She is not in acute distress.     Appearance: Normal appearance. She is well-developed. She is not diaphoretic.   HENT:      Head: Normocephalic and atraumatic.      Right Ear: External ear normal.      Left Ear: External ear normal.      Mouth/Throat:      Pharynx: Posterior oropharyngeal erythema present. No oropharyngeal exudate.     Eyes:      General: Lids are normal.         Right eye: No discharge.         Left eye: No discharge.      Conjunctiva/sclera: Conjunctivae normal.   Cardiovascular:      Rate and Rhythm: Normal rate and regular rhythm.      Heart sounds: No murmur heard.  Pulmonary:      Effort: Pulmonary effort is normal. No respiratory distress.      Breath sounds: Normal breath sounds. No wheezing.   Musculoskeletal:         General: No deformity.   Skin:     General: Skin is warm and dry.   Neurological:      General: No focal deficit present.      Mental Status: She is alert and oriented to person, place, and time.   Psychiatric:         Speech: Speech normal.         Behavior: Behavior normal.         Thought Content: Thought content normal.         Judgment: Judgment normal.         "

## 2025-02-24 NOTE — TELEPHONE ENCOUNTER
Received call from patient was seen at ED yesterday states she is still coughing and is requesting medication she was on in the past for cough     promethazine-codeine (PHENERGAN WITH CODEINE) 6.25-10 mg/5 mL syrup      Please advise.

## 2025-02-24 NOTE — TELEPHONE ENCOUNTER
I called and spoke with the patient and made her aware she would need to be seen for her cough as we did not initially treat her, she scheduled an appt for tomorrow will call ER and see if they can send something in and will call back to cancel if appt is not needed.

## 2025-02-24 NOTE — PATIENT INSTRUCTIONS
Continue with antibiotics and nasal spray.     Tylenol okay to continue with as needed.     Start cough medication, this is the Tessalon perles. One pill every 8 hours as needed for cough.     Start prednisone, this is the steroid. 40mg daily for 3 days. Take with food. It's better to take it earlier in the day than later as it could keep you up at night. Do not mix with NSAIDs such as aleve, ibuprofen, advil, motrin.     If no improvement could consider chest x ray or inhaler as discussed.     Please call the office if you are experiencing any worsening of symptoms or no symptom improvement.

## 2025-02-26 ENCOUNTER — APPOINTMENT (OUTPATIENT)
Dept: PHYSICAL THERAPY | Facility: MEDICAL CENTER | Age: 55
End: 2025-02-26
Payer: COMMERCIAL

## 2025-02-27 ENCOUNTER — NURSE TRIAGE (OUTPATIENT)
Age: 55
End: 2025-02-27

## 2025-02-27 NOTE — TELEPHONE ENCOUNTER
"Patient completed her Prednisone and is still experiencing congestion. She would like a call back to advise if it is okay to take Zyrtec and OTC ASA with her Tessalon Perles.    Reason for Disposition   Caller has NON-URGENT medicine question about med that PCP or specialist prescribed and triager unable to answer question    Answer Assessment - Initial Assessment Questions  1. NAME of MEDICINE: \"What medicine(s) are you calling about?\"      Tessalon Perles, Prednisone   2. QUESTION: \"What is your question?\" (e.g., double dose of medicine, side effect)      Okay to take with Chioma ASA and Zyrtec   3. PRESCRIBER: \"Who prescribed the medicine?\" Reason: if prescribed by specialist, call should be referred to that group.      PCP    Protocols used: Medication Question Call-Adult-[place]    "

## 2025-02-27 NOTE — TELEPHONE ENCOUNTER
Pt called in to check if her request for med has been approved by Eva. Please do call back the patient. Thanks

## 2025-02-27 NOTE — TELEPHONE ENCOUNTER
Patient called to confirm that the newly prescribed benzonatate and prednisone are safe to take with Zyrtec and Chioma Aspirin which the patient takes. Successfully warm transferred call to Nurse Kennedy.

## 2025-02-28 ENCOUNTER — APPOINTMENT (OUTPATIENT)
Dept: PHYSICAL THERAPY | Facility: MEDICAL CENTER | Age: 55
End: 2025-02-28
Payer: COMMERCIAL

## 2025-02-28 ENCOUNTER — PATIENT MESSAGE (OUTPATIENT)
Dept: FAMILY MEDICINE CLINIC | Facility: CLINIC | Age: 55
End: 2025-02-28

## 2025-03-05 ENCOUNTER — OFFICE VISIT (OUTPATIENT)
Dept: PHYSICAL THERAPY | Facility: MEDICAL CENTER | Age: 55
End: 2025-03-05
Payer: COMMERCIAL

## 2025-03-05 DIAGNOSIS — M17.12 PRIMARY OSTEOARTHRITIS OF LEFT KNEE: Primary | ICD-10-CM

## 2025-03-05 DIAGNOSIS — M70.62 GREATER TROCHANTERIC BURSITIS OF LEFT HIP: ICD-10-CM

## 2025-03-05 PROCEDURE — 97140 MANUAL THERAPY 1/> REGIONS: CPT | Performed by: PHYSICAL THERAPIST

## 2025-03-05 PROCEDURE — 97110 THERAPEUTIC EXERCISES: CPT | Performed by: PHYSICAL THERAPIST

## 2025-03-05 PROCEDURE — 97112 NEUROMUSCULAR REEDUCATION: CPT | Performed by: PHYSICAL THERAPIST

## 2025-03-05 NOTE — PROGRESS NOTES
Daily Note     Today's date: 3/5/2025  Patient name: Negin Tapia  : 1970  MRN: [id number]  Referring provider: Erica Rowland,*  Dx:   Encounter Diagnosis     ICD-10-CM    1. Primary osteoarthritis of left knee  M17.12       2. Greater trochanteric bursitis of left hip  M70.62                      Subjective: Patient states that she is doing well.      Objective: See treatment diary below.      Assessment:  Patient demonstrates good tolerance to tx c/ decreased soft tissue restrictions and decreased left hip pain.  Tolerated treatment well. Patient would benefit from continued PT.      Plan: Continue per plan of care.      Precautions:  N/A    Manuals  3          STM/MFR  AZ [place]                                                 Neuro Re-Ed             TA isometrics 2x10 5s 2x10 5s 2x10 5s                                                 Ther Ex                          Sup hip add 2x10 5s 2x10 5s  2x10 5s          Bridges 2x10 5s 2x10 5s 2x10 5s                       Sup hamstring str 3x30s 3x30s 3x30s          Prone quad str 3x30s 3x30s 3x30s          Standing hip flexor str 3x30s 3x30s 3x30s                       Ther Activity                                       Gait Training                                       Modalities               10' 10'

## 2025-03-07 ENCOUNTER — OFFICE VISIT (OUTPATIENT)
Dept: PHYSICAL THERAPY | Facility: MEDICAL CENTER | Age: 55
End: 2025-03-07
Payer: COMMERCIAL

## 2025-03-07 ENCOUNTER — TELEPHONE (OUTPATIENT)
Age: 55
End: 2025-03-07

## 2025-03-07 DIAGNOSIS — J06.9 UPPER RESPIRATORY TRACT INFECTION, UNSPECIFIED TYPE: Primary | ICD-10-CM

## 2025-03-07 DIAGNOSIS — M17.12 PRIMARY OSTEOARTHRITIS OF LEFT KNEE: Primary | ICD-10-CM

## 2025-03-07 DIAGNOSIS — M70.62 GREATER TROCHANTERIC BURSITIS OF LEFT HIP: ICD-10-CM

## 2025-03-07 PROCEDURE — 97110 THERAPEUTIC EXERCISES: CPT | Performed by: PHYSICAL THERAPIST

## 2025-03-07 PROCEDURE — 97112 NEUROMUSCULAR REEDUCATION: CPT | Performed by: PHYSICAL THERAPIST

## 2025-03-07 PROCEDURE — 97140 MANUAL THERAPY 1/> REGIONS: CPT | Performed by: PHYSICAL THERAPIST

## 2025-03-07 RX ORDER — PREDNISONE 10 MG/1
TABLET ORAL
Qty: 21 TABLET | Refills: 0 | Status: SHIPPED | OUTPATIENT
Start: 2025-03-07

## 2025-03-07 RX ORDER — AZITHROMYCIN 250 MG/1
TABLET, FILM COATED ORAL
Qty: 6 TABLET | Refills: 0 | Status: SHIPPED | OUTPATIENT
Start: 2025-03-07 | End: 2025-03-11

## 2025-03-07 NOTE — PROGRESS NOTES
Daily Note     Today's date: 3/7/2025  Patient name: Negin Tapia  : 1970  MRN: [id number]  Referring provider: Erica Rowland,*  Dx:   Encounter Diagnosis     ICD-10-CM    1. Primary osteoarthritis of left knee  M17.12       2. Greater trochanteric bursitis of left hip  M70.62                      Subjective: Patient states that she is doing well.       Objective: See treatment diary below.      Assessment:  Patient demonstrates good tolerance to progressions c/ decreased pain hip pain.  Tolerated treatment well. Patient would benefit from continued PT.      Plan: Continue per plan of care.      Precautions:  N/A    Manuals 2/17 2/21 3/5 3/7         STM/MFR  AZ AZ                                                 Neuro Re-Ed             TA isometrics 2x10 5s 2x10 5s 2x10 5s 3x10 5s                                                Ther Ex                          Sup hip add 2x10 5s 2x10 5s  2x10 5s 3x10 5s         Bridges 2x10 5s 2x10 5s 2x10 5s 3x10 5s         Clams    3x10 5s         Reverse clams    3x10 5s         Sup hamstring str 3x30s 3x30s 3x30s 3x30s         Prone quad str 3x30s 3x30s 3x30s 3x30s         Standing hip flexor str 3x30s 3x30s 3x30s 3x30s                      Ther Activity                                       Gait Training                                       Modalities               10' 10' 10'

## 2025-03-07 NOTE — TELEPHONE ENCOUNTER
Informed patient, note has been routed, awaiting response as provider has been with patients all day.

## 2025-03-07 NOTE — TELEPHONE ENCOUNTER
Last visit 02/24/2025   No upcoming appt     Patient stated that she came in on 02/24 for cold symptoms and was given antibiotics and was informed by PACO Solis that if the symptoms did not go away to call back for a possible 2nd dose of antibiotics. Patient is complaining of still being congested, having pressure behind her ears, coughing and mucus is cloudy and milky, not clear. Patient is asking if Dr Sofia can prescribe an antibiotic at the [facility]. If she needs to be seen she is open to coming in today. Please contact patient at [phone]. Please advise.

## 2025-03-07 NOTE — TELEPHONE ENCOUNTER
Patient called back wanting to know if anyone responded yet to her message. She stated she still is not feeling well and wanted to know what the next steps would  be and to hopefully have meds called in before the weekend.

## 2025-03-08 NOTE — TELEPHONE ENCOUNTER
Sorry for the delay.  Long day.  Yes I can call in a course of antibiotic.  Probably the pharmacy is closed.  (Looks like they open sent today at 9 AM.) Checked there Saturday hours.  Prednisone taper might be helpful as well.

## 2025-03-10 ENCOUNTER — OFFICE VISIT (OUTPATIENT)
Dept: PHYSICAL THERAPY | Facility: MEDICAL CENTER | Age: 55
End: 2025-03-10
Payer: COMMERCIAL

## 2025-03-10 DIAGNOSIS — M70.62 GREATER TROCHANTERIC BURSITIS OF LEFT HIP: ICD-10-CM

## 2025-03-10 DIAGNOSIS — M17.12 PRIMARY OSTEOARTHRITIS OF LEFT KNEE: Primary | ICD-10-CM

## 2025-03-10 PROCEDURE — 97140 MANUAL THERAPY 1/> REGIONS: CPT | Performed by: PHYSICAL THERAPIST

## 2025-03-10 PROCEDURE — 97112 NEUROMUSCULAR REEDUCATION: CPT | Performed by: PHYSICAL THERAPIST

## 2025-03-10 PROCEDURE — 97110 THERAPEUTIC EXERCISES: CPT | Performed by: PHYSICAL THERAPIST

## 2025-03-10 NOTE — PROGRESS NOTES
Daily Note     Today's date: 3/10/2025  Patient name: Negin Tapia  : 1970  MRN: [id number]  Referring provider: Erica Rowland,*  Dx:   Encounter Diagnosis     ICD-10-CM    1. Primary osteoarthritis of left knee  M17.12       2. Greater trochanteric bursitis of left hip  M70.62                      Subjective: Patient states that she was sore yesterday d/t running a lot of errands, but feels better today.      Objective: See treatment diary below.      Assessment:  Patient presents c/ tightness t/o piriformis and TFL, which has improved since initial visit.  She presents without pelvic malalignment.  Tolerated treatment well. Patient would benefit from continued PT.      Plan: Continue per plan of care.      Precautions:  N/A    Manuals 2/17 2/21 3/5 3/7 3/10        STM/MFR  AZ AZ AZ AZ                                               Neuro Re-Ed             TA isometrics 2x10 5s 2x10 5s 2x10 5s 3x10 5s 3x10 5s                                               Ther Ex                          Sup hip add 2x10 5s 2x10 5s  2x10 5s 3x10 5s 3x10 5s        Bridges 2x10 5s 2x10 5s 2x10 5s 3x10 5s 3x10 5s        Clams    3x10 5s 3x10 5s        Reverse clams    3x10 5s 3x10 5s        Sup hamstring str 3x30s 3x30s 3x30s 3x30s 3x30s        Prone quad str 3x30s 3x30s 3x30s 3x30s 3x30s        Standing hip flexor str 3x30s 3x30s 3x30s 3x30s 3x30s                     Ther Activity                                       Gait Training                                       Modalities               10' 10' 10' 10'

## 2025-03-12 ENCOUNTER — OFFICE VISIT (OUTPATIENT)
Dept: PHYSICAL THERAPY | Facility: MEDICAL CENTER | Age: 55
End: 2025-03-12
Payer: COMMERCIAL

## 2025-03-12 DIAGNOSIS — M17.12 PRIMARY OSTEOARTHRITIS OF LEFT KNEE: Primary | ICD-10-CM

## 2025-03-12 DIAGNOSIS — M70.62 GREATER TROCHANTERIC BURSITIS OF LEFT HIP: ICD-10-CM

## 2025-03-12 PROCEDURE — 97112 NEUROMUSCULAR REEDUCATION: CPT | Performed by: PHYSICAL THERAPIST

## 2025-03-12 PROCEDURE — 97110 THERAPEUTIC EXERCISES: CPT | Performed by: PHYSICAL THERAPIST

## 2025-03-12 PROCEDURE — 97140 MANUAL THERAPY 1/> REGIONS: CPT | Performed by: PHYSICAL THERAPIST

## 2025-03-12 NOTE — PROGRESS NOTES
Daily Note     Today's date: 3/12/2025  Patient name: Negin Tapia  : 1970  MRN: [id number]  Referring provider: Erica Rowland,*  Dx:   Encounter Diagnosis     ICD-10-CM    1. Primary osteoarthritis of left knee  M17.12       2. Greater trochanteric bursitis of left hip  M70.62                      Subjective: Patient states she walked this week and feels good.      Objective: See treatment diary below.      Assessment: Tolerated treatment well. Patient would benefit from continued PT.      Plan: Continue per plan of care.      Precautions:  N/A    Manuals 2/17 2/21 3/5 3/7 3/10 3/12       STM/MFR  AZ AZ AZ AZ AZ                                              Neuro Re-Ed             TA isometrics 2x10 5s 2x10 5s 2x10 5s 3x10 5s 3x10 5s 3x10 5s                                              Ther Ex                          Sup hip add 2x10 5s 2x10 5s  2x10 5s 3x10 5s 3x10 5s 3x10 5s       Bridges 2x10 5s 2x10 5s 2x10 5s 3x10 5s 3x10 5s 3x10 5s       Clams    3x10 5s 3x10 5s 3x10 5s       Reverse clams    3x10 5s 3x10 5s 3x10 5s       Sup hamstring str 3x30s 3x30s 3x30s 3x30s 3x30s 3x30s       Prone quad str 3x30s 3x30s 3x30s 3x30s 3x30s 3x30s       Standing hip flexor str 3x30s 3x30s 3x30s 3x30s 3x30s 3x30s                    Ther Activity                                       Gait Training                                       Modalities               10' 10' 10' 10' 10'

## 2025-03-17 ENCOUNTER — OFFICE VISIT (OUTPATIENT)
Dept: PHYSICAL THERAPY | Facility: MEDICAL CENTER | Age: 55
End: 2025-03-17
Payer: COMMERCIAL

## 2025-03-17 DIAGNOSIS — M17.12 PRIMARY OSTEOARTHRITIS OF LEFT KNEE: Primary | ICD-10-CM

## 2025-03-17 DIAGNOSIS — M70.62 GREATER TROCHANTERIC BURSITIS OF LEFT HIP: ICD-10-CM

## 2025-03-17 PROCEDURE — 97140 MANUAL THERAPY 1/> REGIONS: CPT | Performed by: PHYSICAL THERAPIST

## 2025-03-17 PROCEDURE — 97110 THERAPEUTIC EXERCISES: CPT | Performed by: PHYSICAL THERAPIST

## 2025-03-17 PROCEDURE — 97112 NEUROMUSCULAR REEDUCATION: CPT | Performed by: PHYSICAL THERAPIST

## 2025-03-17 NOTE — PROGRESS NOTES
Daily Note     Today's date: 3/17/2025  Patient name: Negin Tapia  : 1970  MRN: [id number]  Referring provider: Erica Rowland,*  Dx:   Encounter Diagnosis     ICD-10-CM    1. Primary osteoarthritis of left knee  M17.12       2. Greater trochanteric bursitis of left hip  M70.62                      Subjective:  Patient states that the hip is doing well, but she strained her hamstring while out running errands this weekend.      Objective: See treatment diary below.      Assessment:  Patient demonstrates mild tenderness t/o left hamstring mm.  She continues to demonstrate improved hip pain.  Tolerated treatment well. Patient would benefit from continued PT.      Plan: Continue per plan of care.      Precautions:  N/A    Manuals 2/17 2/21 3/5 3/7 3/10 3/12 3/17      STM/MFR  AZ AZ AZ AZ AZ AZ                                             Neuro Re-Ed             TA isometrics 2x10 5s 2x10 5s 2x10 5s 3x10 5s 3x10 5s 3x10 5s 3x10 5s                                             Ther Ex                          Sup hip add 2x10 5s 2x10 5s  2x10 5s 3x10 5s 3x10 5s 3x10 5s 3x10 5s      Bridges 2x10 5s 2x10 5s 2x10 5s 3x10 5s 3x10 5s 3x10 5s 3x10 5s      Clams    3x10 5s 3x10 5s 3x10 5s 3x10 5s      Reverse clams    3x10 5s 3x10 5s 3x10 5s 3x10 5s      Sup hamstring str 3x30s 3x30s 3x30s 3x30s 3x30s 3x30s 3x30s      Prone quad str 3x30s 3x30s 3x30s 3x30s 3x30s 3x30s 3x30s      Standing hip flexor str 3x30s 3x30s 3x30s 3x30s 3x30s 3x30s 3x30s                   Ther Activity                                       Gait Training                                       Modalities               10' 10' 10' 10' 10' 10'

## 2025-03-19 ENCOUNTER — OFFICE VISIT (OUTPATIENT)
Dept: PHYSICAL THERAPY | Facility: MEDICAL CENTER | Age: 55
End: 2025-03-19
Payer: COMMERCIAL

## 2025-03-19 DIAGNOSIS — M17.12 PRIMARY OSTEOARTHRITIS OF LEFT KNEE: Primary | ICD-10-CM

## 2025-03-19 DIAGNOSIS — M70.62 GREATER TROCHANTERIC BURSITIS OF LEFT HIP: ICD-10-CM

## 2025-03-19 PROCEDURE — 97110 THERAPEUTIC EXERCISES: CPT | Performed by: PHYSICAL THERAPIST

## 2025-03-19 PROCEDURE — 97140 MANUAL THERAPY 1/> REGIONS: CPT | Performed by: PHYSICAL THERAPIST

## 2025-03-19 PROCEDURE — 97112 NEUROMUSCULAR REEDUCATION: CPT | Performed by: PHYSICAL THERAPIST

## 2025-03-19 NOTE — PROGRESS NOTES
Daily Note     Today's date: 3/19/2025  Patient name: Negin Tapia  : 1970  MRN: [id number]  Referring provider: Erica Rowland,*  Dx:   Encounter Diagnosis     ICD-10-CM    1. Primary osteoarthritis of left knee  M17.12       2. Greater trochanteric bursitis of left hip  M70.62                      Subjective: Patient states that he feels good and he was able to get to the gym and perform the progressions without difficulty.        Objective: See treatment diary below.      Assessment:  Patient presents with decreased pain and demonstrates good tolerance to progressions.  Tolerated treatment well. Patient would benefit from continued PT.      Plan: Continue per plan of care.      Precautions:  N/A    Manuals 2/17 2/21 3/5 3/7 3/10 3/12 3/17 3/19     STM/MFR  AZ AZ AZ AZ AZ AZ AZ                                            Neuro Re-Ed             TA isometrics 2x10 5s 2x10 5s 2x10 5s 3x10 5s 3x10 5s 3x10 5s 3x10 5s 3x12 5s                                            Ther Ex                          Sup hip add 2x10 5s 2x10 5s  2x10 5s 3x10 5s 3x10 5s 3x10 5s 3x10 5s 3x12 5s     Bridges 2x10 5s 2x10 5s 2x10 5s 3x10 5s 3x10 5s 3x10 5s 3x10 5s 3x12 5s     Clams    3x10 5s 3x10 5s 3x10 5s 3x10 5s 3x12 5s     Reverse clams    3x10 5s 3x10 5s 3x10 5s 3x10 5s 3x12 5s     Sup hamstring str 3x30s 3x30s 3x30s 3x30s 3x30s 3x30s 3x30s 3x30s     Prone quad str 3x30s 3x30s 3x30s 3x30s 3x30s 3x30s 3x30s 3x30s     Standing hip flexor str 3x30s 3x30s 3x30s 3x30s 3x30s 3x30s 3x30s 3x30s                  Ther Activity                                       Gait Training                                       Modalities               10' 10' 10' 10' 10' 10' 10'

## 2025-03-26 ENCOUNTER — OFFICE VISIT (OUTPATIENT)
Dept: PHYSICAL THERAPY | Facility: MEDICAL CENTER | Age: 55
End: 2025-03-26
Payer: COMMERCIAL

## 2025-03-26 DIAGNOSIS — M70.62 GREATER TROCHANTERIC BURSITIS OF LEFT HIP: ICD-10-CM

## 2025-03-26 DIAGNOSIS — M17.12 PRIMARY OSTEOARTHRITIS OF LEFT KNEE: Primary | ICD-10-CM

## 2025-03-26 PROCEDURE — 97110 THERAPEUTIC EXERCISES: CPT | Performed by: PHYSICAL THERAPIST

## 2025-03-26 PROCEDURE — 97140 MANUAL THERAPY 1/> REGIONS: CPT | Performed by: PHYSICAL THERAPIST

## 2025-03-26 PROCEDURE — 97112 NEUROMUSCULAR REEDUCATION: CPT | Performed by: PHYSICAL THERAPIST

## 2025-03-26 NOTE — PROGRESS NOTES
Daily Note     Today's date: 3/26/2025  Patient name: Negin Tapia  : 1970  MRN: [id number]  Referring provider: Erica Rowland,*  Dx:   Encounter Diagnosis     ICD-10-CM    1. Primary osteoarthritis of left knee  M17.12       2. Greater trochanteric bursitis of left hip  M70.62                      Subjective: Patient states that she feels much better and has met all of her goals.       Objective: See treatment diary below.      Assessment: Negin Tapia has been compliant with attending PT and home exercise program since initial eval.  Negin  has made improvements in objective data since initial evalulation and has achieved all goals.  Patient reports having returned to their prior level or function. Patient provided with updated Home Exercise Program, all questions answered, verbalized understanding and agreement to plan of care. Thus it was mutually decided to discontinue this episode of care and transition to Home Exercise Program.      Plan:  d/c to HEP.     Precautions:  N/A    Manuals 2/17 2/21 3/5 3/7 3/10 3/12 3/17 3/19 3/26    STM/MFR  AZ AZ AZ AZ AZ AZ AZ AZ                                           Neuro Re-Ed             TA isometrics 2x10 5s 2x10 5s 2x10 5s 3x10 5s 3x10 5s 3x10 5s 3x10 5s 3x12 5s 35x 5s                                           Ther Ex                          Sup hip add 2x10 5s 2x10 5s  2x10 5s 3x10 5s 3x10 5s 3x10 5s 3x10 5s 3x12 5s 35x 5s    Bridges 2x10 5s 2x10 5s 2x10 5s 3x10 5s 3x10 5s 3x10 5s 3x10 5s 3x12 5s 35x 5s    Clams    3x10 5s 3x10 5s 3x10 5s 3x10 5s 3x12 5s 3x12 5s    Reverse clams    3x10 5s 3x10 5s 3x10 5s 3x10 5s 3x12 5s 3x12 5s    Sup hamstring str 3x30s 3x30s 3x30s 3x30s 3x30s 3x30s 3x30s 3x30s 3x30s    Prone quad str 3x30s 3x30s 3x30s 3x30s 3x30s 3x30s 3x30s 3x30s 3x30s    Standing hip flexor str 3x30s 3x30s 3x30s 3x30s 3x30s 3x30s 3x30s 3x30s 3x30s                 Ther Activity                                       Gait Training                                        Modalities               10' 10' 10' 10' 10' 10' 10' 10'

## 2025-04-08 ENCOUNTER — HOSPITAL ENCOUNTER (OUTPATIENT)
Dept: NON INVASIVE DIAGNOSTICS | Facility: HOSPITAL | Age: 55
Discharge: HOME | End: 2025-04-08
Attending: SURGERY
Payer: COMMERCIAL

## 2025-04-08 DIAGNOSIS — I77.3 FIBROMUSCULAR DYSPLASIA OF BOTH CAROTID ARTERIES (HCC): ICD-10-CM

## 2025-04-08 PROCEDURE — 93880 EXTRACRANIAL BILAT STUDY: CPT | Performed by: SURGERY

## 2025-04-08 PROCEDURE — 93880 EXTRACRANIAL BILAT STUDY: CPT

## 2025-04-11 ENCOUNTER — TELEPHONE (OUTPATIENT)
Age: 55
End: 2025-04-11

## 2025-04-16 NOTE — TELEPHONE ENCOUNTER
Called patient and left message, Weight Management is a specialty office, so it is typically covered.   
I called and spoke with the patient and she stated she seen the nutritionist last year and it was covered through [facility]. She would like a new referral to [facility] weight management   
I guess the issue is the weight management program is an out-of-pocket expense?  And you have to see the other providers before you see just the nutritionist?  
Left detailed message for pt that referral placed for Weight management   
Patient called to see if weight management would be covered by her insurance. Patient stated she had seen only a nutritionist last year and that had been covered by her insurance however she is unsure if weight management would be. Please advise and return patient call to discuss further.   
Pt would like a referral for a nutritionist to help with weight loss, can this be ordered for her?    Please advise  
[facility] weight management program which does incorporate nutritionist.  There are also nutritionist at [facility] but I do not know if they specifically talk about weight loss specific dietary strategies.  I will have staff research we will get back to patient  
Will do  
no sig change from 12/21/17, rbbb, ns tw changes (inversions in II,III, F, V3-V5)

## 2025-04-17 ENCOUNTER — TELEPHONE (OUTPATIENT)
Age: 55
End: 2025-04-17

## 2025-04-21 ENCOUNTER — TELEPHONE (OUTPATIENT)
Age: 55
End: 2025-04-21

## 2025-04-21 NOTE — TELEPHONE ENCOUNTER
Patient would like a call back to advise if her PCP feels that Barbarine is okay to take as a supplement with her current medications/supplements.

## 2025-04-25 ENCOUNTER — APPOINTMENT (OUTPATIENT)
Dept: RADIOLOGY | Facility: MEDICAL CENTER | Age: 55
End: 2025-04-25
Attending: ORTHOPAEDIC SURGERY
Payer: COMMERCIAL

## 2025-04-25 ENCOUNTER — OFFICE VISIT (OUTPATIENT)
Dept: OBGYN CLINIC | Facility: MEDICAL CENTER | Age: 55
End: 2025-04-25
Payer: COMMERCIAL

## 2025-04-25 DIAGNOSIS — M17.12 PRIMARY OSTEOARTHRITIS OF LEFT KNEE: Primary | ICD-10-CM

## 2025-04-25 DIAGNOSIS — Z01.89 ENCOUNTER FOR LOWER EXTREMITY COMPARISON IMAGING STUDY: ICD-10-CM

## 2025-04-25 DIAGNOSIS — M70.62 GREATER TROCHANTERIC BURSITIS OF LEFT HIP: ICD-10-CM

## 2025-04-25 DIAGNOSIS — M16.12 PRIMARY OSTEOARTHRITIS OF ONE HIP, LEFT: ICD-10-CM

## 2025-04-25 DIAGNOSIS — M17.12 PRIMARY OSTEOARTHRITIS OF LEFT KNEE: ICD-10-CM

## 2025-04-25 PROCEDURE — 73560 X-RAY EXAM OF KNEE 1 OR 2: CPT

## 2025-04-25 PROCEDURE — 99214 OFFICE O/P EST MOD 30 MIN: CPT | Performed by: ORTHOPAEDIC SURGERY

## 2025-04-25 PROCEDURE — 73564 X-RAY EXAM KNEE 4 OR MORE: CPT

## 2025-04-25 PROCEDURE — 73502 X-RAY EXAM HIP UNI 2-3 VIEWS: CPT

## 2025-04-25 PROCEDURE — 20610 DRAIN/INJ JOINT/BURSA W/O US: CPT | Performed by: PHYSICIAN ASSISTANT

## 2025-04-25 RX ORDER — TRIAMCINOLONE ACETONIDE 40 MG/ML
40 INJECTION, SUSPENSION INTRA-ARTICULAR; INTRAMUSCULAR
Status: COMPLETED | OUTPATIENT
Start: 2025-04-25 | End: 2025-04-25

## 2025-04-25 RX ORDER — BUPIVACAINE HYDROCHLORIDE 2.5 MG/ML
2 INJECTION, SOLUTION INFILTRATION; PERINEURAL
Status: COMPLETED | OUTPATIENT
Start: 2025-04-25 | End: 2025-04-25

## 2025-04-25 RX ADMIN — BUPIVACAINE HYDROCHLORIDE 2 ML: 2.5 INJECTION, SOLUTION INFILTRATION; PERINEURAL at 08:00

## 2025-04-25 RX ADMIN — TRIAMCINOLONE ACETONIDE 40 MG: 40 INJECTION, SUSPENSION INTRA-ARTICULAR; INTRAMUSCULAR at 08:00

## 2025-04-25 NOTE — ASSESSMENT & PLAN NOTE
Patient has early moderate varus OA left knee and grade 3 trochlea and patellofemoral changes per MRI in 2023.  Patient has left greater trochanteric bursitis and mild OA left hip.  Physical therapy for the intra-articular pathology patient completed and she states it definitely helped.  She has no groin pain today with excellent range of motion.  She was tender over the greater trochanter and did request injection.  Treatment options were discussed with patient today.  Patient is a surgical candidate at this time.  However symptoms do not warrant any intervention.  Injections: Patient received left knee steroid injection today and left greater trochanteric bursa injection was completed today, tolerated the procedure well. Post injection instructions reviewed including information on glucose monitoring for diabetic patients. Patient aware that they may repeat steroid injection every 3 months if needed.   Medications: Continue turmeric  PT: New physical prescription was given last office visit. This helped the pain she had in the groin.  Bracing: Patient declined knee brace.   Activity: Continue activity as tolerated.   Plan for next appt: Follow-up 3 months, consider possible repeat left knee CSI injection and reassess left trochanteric bursa for possible injection  Orders:    XR knee 4+ vw left injury; Future

## 2025-04-25 NOTE — PROGRESS NOTES
Name: Negin Tapia      : 1970      MRN: [id number]  Encounter Provider: Erica Rowland DO  Encounter Date: 2025   Encounter department: [facility]  :  Assessment & Plan  Primary osteoarthritis of left knee  Patient has early moderate varus OA left knee and grade 3 trochlea and patellofemoral changes per MRI in .  Patient has left greater trochanteric bursitis and mild OA left hip.  Physical therapy for the intra-articular pathology patient completed and she states it definitely helped.  She has no groin pain today with excellent range of motion.  She was tender over the greater trochanter and did request injection.  Treatment options were discussed with patient today.  Patient is a surgical candidate at this time.  However symptoms do not warrant any intervention.  Injections: Patient received left knee steroid injection today and left greater trochanteric bursa injection was completed today, tolerated the procedure well. Post injection instructions reviewed including information on glucose monitoring for diabetic patients. Patient aware that they may repeat steroid injection every 3 months if needed.   Medications: Continue turmeric  PT: New physical prescription was given last office visit. This helped the pain she had in the groin.  Bracing: Patient declined knee brace.   Activity: Continue activity as tolerated.   Plan for next appt: Follow-up 3 months, consider possible repeat left knee CSI injection and reassess left trochanteric bursa for possible injection  Orders:    XR knee 4+ vw left injury; Future    Encounter for lower extremity comparison imaging study    Orders:    XR knee 1 or 2 vw right; Future    Greater trochanteric bursitis of left hip         Primary osteoarthritis of one hip, left    Orders:    XR hip/pelv 2-3 vws left if performed; Future            No follow-ups on file.    I answered all of the patient's questions during  the visit and provided education of the patient's condition during the visit.  The patient verbalized understanding of the information given and agrees with the plan.  This note was dictated using [employer] software.  It may contain errors including improperly dictated words.  Please contact physician directly for any questions.    Subjective   Chief Complaint:   Chief Complaint   Patient presents with    Left Knee - Follow-up         History of Present Illness     [facility]    Negin Tapia is a 54 y.o. female who presents for follow up for left knee, left hip bursitis, and mild OA left hip.  Cortisone injection given 1- provided about 2 months relief to her knees.  Prior to the last visit she did get a left hip bursa injection which helped.  She did not get one last time.  She did do physical therapy for her hip as she had some mild groin pain.  She states she feels it definitely helped.  She currently does not have the groin pain.  She is requesting left knee injection and left bursa.      Review of Systems  ROS:    See [facility] for musculoskeletal review.   All other systems reviewed are negative     History:  Past Medical History:   Diagnosis Date    Cancer (HCC)     Thyroid    Cervical neuritis 05/16/2013    C7- T1 Left, Last assessed    Disease of thyroid gland     Endometriosis, cervix     GERD (gastroesophageal reflux disease)     Herpes     Hiatal hernia     R INGUINAL    Hypothyroidism     Left bundle branch block     Ovarian cyst     Precordial chest pain 5/18/2023    Thyroid cancer (HCC)     THYROID     Past Surgical History:   Procedure Laterality Date    CHOLECYSTECTOMY  05/2020    COLONOSCOPY  12/2015 2015  Anastasiya. Normal exam, hemorrhoids.    COLONOSCOPY  2010    Dr. Langford.  Normal exam, hemorrhoids.    COLONOSCOPY  1996    Dr. Gonzalez.  Normal exam    HERNIA REPAIR      HYSTERECTOMY  2005    has ovaries    WI LAPAROSCOPY SURG CHOLECYSTECTOMY N/A 05/23/2019    Procedure: DEMETRA. ZANE., IOCG;  Surgeon:  "Maya Harrison MD;  Location: [facility] OR;  Service: General    THYROIDECTOMY      early 2008 [place]    TUBAL LIGATION       Social History   Social History     Substance and Sexual Activity   Alcohol Use Not Currently    Alcohol/week: 1.0 standard drink of alcohol    Types: 1 Glasses of wine per week    Comment: socially     Social History     Substance and Sexual Activity   Drug Use No     Social History     Tobacco Use   Smoking Status Never   Smokeless Tobacco Never     Family History:   Family History   Problem Relation Age of Onset    Thyroid disease Mother     Cancer Father     Colon polyps Father     Colon cancer Father     Arthritis Sister     Thyroid disease Sister     Thyroid disease Brother     Glaucoma Brother     Coronary artery disease Maternal Grandmother     Diabetes Maternal Grandmother     Heart disease Maternal Grandmother     No Known Problems Maternal Grandfather     No Known Problems Paternal Grandmother     No Known Problems Paternal Grandfather        Current Outpatient Medications on File Prior to Visit   Medication Sig Dispense Refill    aspirin 81 mg chewable tablet Chew 1 tablet (81 mg total) daily      cholecalciferol (VITAMIN D3) 1,000 units tablet Take 2,000 Units by mouth daily       levothyroxine 88 mcg tablet 100 mcg      Multiple Vitamins-Minerals (multivitamin with minerals) tablet Take 1 tablet by mouth daily      Turmeric 500 MG CAPS Take 1 tablet by mouth daily        No current facility-administered medications on file prior to visit.     Allergies   Allergen Reactions    Bee Venom Anaphylaxis    Dust Mite Extract     Gabapentin Dizziness     Annotation - 22Jan2015: \"felt like out of body experience\"    Molds & Smuts Sneezing    Other      Annotation - 30Jun2013: seasonal, Trees    Pollen Extract     Bernard Grass Pollen Allergen     Diclofenac Rash     tolerates naproxen    Latex Rash          Objective   There were no vitals taken for this visit.         PE:  AAOx " 3  WDWN  Hearing intact, no drainage from eyes  no audible wheezing  no abdominal distension  LE compartments soft, skin intact    Ortho Exam:  left Knee:   No erythema  no swelling  no effusion  no warmth  AROM: 0-120  TTP lateral patella  Stable to varus/valgus stress     left Hip Exam  Palpation:                + TTP over greater trochanter              - TTP over SIJ  ROM:  Normal hip ROM.  Tests:  (+) TIANA. (-) Stinchfield.  NV: sensation grossly intact L4, L5, S1, palpable pedal pulse  Imaging Studies: Results Review Statement: I personally reviewed the following image studies in PACS and associated radiology reports: xray(s). My interpretation of the radiology images/reports is: 4 views left knee shows early moderate varus OA about the medial compartment with slight joint space narrowing.  Also evidence of some early patellofemoral disease and small bone spurs at the patellofemoral area..    Left hip x-ray shows only mild, age-appropriate OA on the left side with overall good preservation of joint space.  Patient actually has more changes on the right side with a pincer lesion about the superior acetabulum.  No evidence of AVN bilaterally.      Large joint arthrocentesis: L knee  Universal Protocol:  procedure performed by consultantConsent: Verbal consent obtained. Written consent not obtained.  Risks and benefits: risks, benefits and alternatives were discussed  Consent given by: patient  Patient understanding: patient states understanding of the procedure being performed  Patient consent: the patient's understanding of the procedure matches consent given  Patient identity confirmed: verbally with patient  Supporting Documentation  Indications: pain and joint swelling     Is this a Visco injection? NoProcedure Details  Location: knee - L knee  Needle size: 22 G  Ultrasound guidance: no  Approach: anterolateral  Medications administered: 2 mL bupivacaine 0.25 %; 40 mg triamcinolone acetonide 40  mg/mL    Patient tolerance: patient tolerated the procedure well with no immediate complications  Dressing:  Sterile dressing applied      Large joint arthrocentesis: L greater trochanteric bursa  Universal Protocol:  procedure performed by consultantConsent: Verbal consent obtained.  Consent given by: patient  Patient understanding: patient states understanding of the procedure being performed  Site marked: the operative site was marked  Patient identity confirmed: verbally with patient  Supporting Documentation  Indications: pain     Is this a Visco injection? NoProcedure Details  Location: hip - L greater trochanteric bursa  Needle size: 22 G  Ultrasound guidance: no  Approach: lateral  Medications administered: 2 mL bupivacaine 0.25 %; 40 mg triamcinolone acetonide 40 mg/mL    Patient tolerance: patient tolerated the procedure well with no immediate complications  Dressing:  Sterile dressing applied          Scribe Attestation      I,:   am acting as a scribe while in the presence of the attending physician.:       I,:   personally performed the services described in this documentation    as scribed in my presence.:

## 2025-04-28 NOTE — PROGRESS NOTES
Name: Negin Tapia      : 1970      MRN: [id number]  Encounter Provider: PACO Forrest  Encounter Date: 2025   Encounter department: [facility]  :  Assessment & Plan  Encounter for gynecological examination (general) (routine) with abnormal findings  Recommended monthly SBE, annual CBE and annual screening mammo.  Colonoscopy noted to be up to date.  Diet/activity recommendations Calcium 1200 mg and Vit D 600-1000 IU daily.  Discussed postmenopausal considerations and symptoms to report. RTO in one year for routine annual gyn exam or sooner PRN.           Screening mammogram for breast cancer    Orders:    Mammo screening bilateral w 3d and cad; Future    OAB (overactive bladder)  OAB discussed at length. Pt advised to decrease irritants and bladder retraining every 2 hours. If sx persist or do not improve after 3 months, will consider meds.        CODI (stress urinary incontinence, female)  Restart consistent kegels. PT and surgical options discussed.            History of Present Illness   This patient presents for routine annual gyn exam.  History of hysterectomy for pain per pt in the early .  OAB - drinks 40-60 oz of water, decaf tea 2 a day, honey and lemon water. Daytime voids 3 hours. Nocturia 2-3x per day. Drinks up until bedtime. CODI only with hard cough and multiple sneezes.   History of thyroidectomy  She denies vaginal bleeding, VM sx, pelvic pain, breast concerns, abnormal discharge, bowel/bladder dysfunction, depression/anx.   , not sexually active.  is disabled.   Mammography up to date and normal, 10/2/24.  Colonoscopy up to date, 22.            Review of Systems   Constitutional: Negative.    Respiratory: Negative.     Cardiovascular: Negative.    Gastrointestinal: Negative.    Endocrine: Negative.    Genitourinary:  Positive for frequency and urgency. Negative for dysuria, pelvic pain, vaginal bleeding,  vaginal discharge and vaginal pain.   Musculoskeletal: Negative.    Skin: Negative.    Neurological: Negative.    Psychiatric/Behavioral: Negative.            Objective   /74 (BP Location: Left arm, Patient Position: Sitting, Cuff Size: Standard)   Wt 86.2 kg (190 lb)   BMI 32.61 kg/m²      Physical Exam  Vitals and nursing note reviewed.   Constitutional:       General: She is not in acute distress.     Appearance: She is well-developed.   HENT:      Head: Normocephalic and atraumatic.   Eyes:      Conjunctiva/sclera: Conjunctivae normal.   Cardiovascular:      Rate and Rhythm: Normal rate and regular rhythm.      Heart sounds: No murmur heard.  Pulmonary:      Effort: Pulmonary effort is normal. No respiratory distress.      Breath sounds: Normal breath sounds.   Chest:   Breasts:     Right: No swelling, bleeding, inverted nipple, mass, nipple discharge, skin change or tenderness.      Left: No swelling, bleeding, inverted nipple, mass, nipple discharge, skin change or tenderness.   Abdominal:      Palpations: Abdomen is soft.      Tenderness: There is no abdominal tenderness.   Genitourinary:     General: Normal vulva.      Exam position: Supine.      Pubic Area: No rash.       Labia:         Right: No rash, tenderness, lesion or injury.         Left: No rash, tenderness, lesion or injury.       Urethra: No urethral pain, urethral swelling or urethral lesion.      Vagina: No signs of injury and foreign body. No vaginal discharge, erythema, tenderness, bleeding, lesions or prolapsed vaginal walls.      Adnexa:         Right: No mass, tenderness or fullness.          Left: No mass, tenderness or fullness.        Comments: Cervix, uterus absent, no masses or tenderness on BME  Musculoskeletal:         General: No swelling.      Cervical back: Neck supple.   Skin:     General: Skin is warm and dry.      Capillary Refill: Capillary refill takes less than 2 seconds.   Neurological:      Mental Status: She is  alert.   Psychiatric:         Mood and Affect: Mood normal.

## 2025-04-29 ENCOUNTER — ANNUAL EXAM (OUTPATIENT)
Dept: GYNECOLOGY | Facility: CLINIC | Age: 55
End: 2025-04-29
Payer: COMMERCIAL

## 2025-04-29 VITALS — BODY MASS INDEX: 32.61 KG/M2 | SYSTOLIC BLOOD PRESSURE: 120 MMHG | WEIGHT: 190 LBS | DIASTOLIC BLOOD PRESSURE: 74 MMHG

## 2025-04-29 DIAGNOSIS — Z12.31 SCREENING MAMMOGRAM FOR BREAST CANCER: ICD-10-CM

## 2025-04-29 DIAGNOSIS — N32.81 OAB (OVERACTIVE BLADDER): ICD-10-CM

## 2025-04-29 DIAGNOSIS — N39.3 SUI (STRESS URINARY INCONTINENCE, FEMALE): ICD-10-CM

## 2025-04-29 DIAGNOSIS — Z01.411 ENCOUNTER FOR GYNECOLOGICAL EXAMINATION (GENERAL) (ROUTINE) WITH ABNORMAL FINDINGS: Primary | ICD-10-CM

## 2025-04-29 PROCEDURE — S0612 ANNUAL GYNECOLOGICAL EXAMINA: HCPCS | Performed by: OBSTETRICS & GYNECOLOGY

## 2025-04-29 RX ORDER — CETIRIZINE HYDROCHLORIDE 10 MG/1
CAPSULE, LIQUID FILLED ORAL
COMMUNITY
Start: 2023-01-01

## 2025-04-29 RX ORDER — HYDROCORTISONE ACETATE 0.5 %
CREAM (GRAM) TOPICAL
COMMUNITY
Start: 2023-01-01

## 2025-04-29 RX ORDER — CALCIUM CARBONATE 300MG(750)
TABLET,CHEWABLE ORAL
COMMUNITY
Start: 2024-01-01

## 2025-04-30 NOTE — PROGRESS NOTES
"Name: Negin Tapia      : 1970      MRN: [id number]  Encounter Provider: Lyndon Galloway MD  Encounter Date: 2025   [facility] [place]  :  Assessment & Plan  Word finding difficulty    Orders:  •  Ambulatory Referral to Neurology; Future    Fibromuscular dysplasia of both carotid arteries (HCC)  54-year-old female presenting back to the office for review of her carotid duplex.  Patient had a CT scan back in  which I reviewed that showed very subtle findings consistent with fibromuscular dysplasia.  Patient has no history of stroke.  Since our visit almost 2 years ago patient has undergone 2 carotid duplexes which I reviewed.  Each of them have not shown any signs of stenosis or fibromuscular dysplasia.    Because I had seen some irregularity on her CAT scan I still believe it is possible she has very mild FMD despite the duplex findings.  I do not believe we need further duplex surveillance and she can be followed clinically.  I am recommending she continue with aspirin 81 mg due to the potential of FMD.    Patient will follow-up with us as needed    Of note, patient reports some word finding difficulty at times as well as some memory loss.  Will refer her to neurology for further workup             History of Present Illness   HPI  Negin Tapia is a 54 y.o. female who presents     Pt here for RR of CV 25 for yearly surveillance. Pt c/o loss for words at times. Pt denies any other TIA/Stroke symptoms.  History obtained from: patient    Review of Systems  Medical History Reviewed by provider this encounter:  Tobacco  Allergies  Meds  Problems  Med Hx  Surg Hx  Fam Hx     .     Objective   /86 (BP Location: Right arm, Patient Position: Sitting)   Pulse 85   Ht 5' 4\" (1.626 m)   Wt 85.3 kg (188 lb)   SpO2 98%   BMI 32.27 kg/m²      Physical Exam  Vitals and nursing note reviewed.   Constitutional:       General: She is not in " acute distress.     Appearance: She is well-developed.   HENT:      Head: Normocephalic and atraumatic.   Eyes:      Conjunctiva/sclera: Conjunctivae normal.   Cardiovascular:      Rate and Rhythm: Normal rate and regular rhythm.   Pulmonary:      Effort: Pulmonary effort is normal.      Breath sounds: Normal breath sounds.   Abdominal:      Palpations: Abdomen is soft.      Tenderness: There is no abdominal tenderness.   Musculoskeletal:      Cervical back: Neck supple.   Skin:     General: Skin is warm and dry.      Capillary Refill: Capillary refill takes less than 2 seconds.   Neurological:      Mental Status: She is alert.   Psychiatric:         Mood and Affect: Mood normal.         Administrative Statements   I have spent a total time of 30 minutes in caring for this patient on the day of the visit/encounter including Diagnostic results, Prognosis, Risks and benefits of tx options, Instructions for management, Patient and family education, Importance of tx compliance, Risk factor reductions, Impressions, Counseling / Coordination of care, Documenting in the medical record, Reviewing/placing orders in the medical record (including tests, medications, and/or procedures), and Obtaining or reviewing history  .

## 2025-05-01 ENCOUNTER — OFFICE VISIT (OUTPATIENT)
Dept: VASCULAR SURGERY | Facility: CLINIC | Age: 55
End: 2025-05-01
Payer: COMMERCIAL

## 2025-05-01 VITALS
HEART RATE: 85 BPM | WEIGHT: 188 LBS | OXYGEN SATURATION: 98 % | DIASTOLIC BLOOD PRESSURE: 86 MMHG | HEIGHT: 64 IN | SYSTOLIC BLOOD PRESSURE: 130 MMHG | BODY MASS INDEX: 32.1 KG/M2

## 2025-05-01 DIAGNOSIS — I77.3 FIBROMUSCULAR DYSPLASIA OF BOTH CAROTID ARTERIES (HCC): ICD-10-CM

## 2025-05-01 DIAGNOSIS — R47.89 WORD FINDING DIFFICULTY: Primary | ICD-10-CM

## 2025-05-01 PROCEDURE — 99214 OFFICE O/P EST MOD 30 MIN: CPT | Performed by: SURGERY

## 2025-05-01 NOTE — ASSESSMENT & PLAN NOTE
54-year-old female presenting back to the office for review of her carotid duplex.  Patient had a CT scan back in 2023 which I reviewed that showed very subtle findings consistent with fibromuscular dysplasia.  Patient has no history of stroke.  Since our visit almost 2 years ago patient has undergone 2 carotid duplexes which I reviewed.  Each of them have not shown any signs of stenosis or fibromuscular dysplasia.    Because I had seen some irregularity on her CAT scan I still believe it is possible she has very mild FMD despite the duplex findings.  I do not believe we need further duplex surveillance and she can be followed clinically.  I am recommending she continue with aspirin 81 mg due to the potential of FMD.    Patient will follow-up with us as needed    Of note, patient reports some word finding difficulty at times as well as some memory loss.  Will refer her to neurology for further workup

## 2025-05-29 ENCOUNTER — OFFICE VISIT (OUTPATIENT)
Dept: FAMILY MEDICINE CLINIC | Facility: CLINIC | Age: 55
End: 2025-05-29
Payer: COMMERCIAL

## 2025-05-29 VITALS
WEIGHT: 187 LBS | HEART RATE: 78 BPM | BODY MASS INDEX: 31.92 KG/M2 | TEMPERATURE: 95.6 F | SYSTOLIC BLOOD PRESSURE: 126 MMHG | HEIGHT: 64 IN | DIASTOLIC BLOOD PRESSURE: 90 MMHG | OXYGEN SATURATION: 98 %

## 2025-05-29 DIAGNOSIS — M79.672 PAIN OF LEFT HEEL: ICD-10-CM

## 2025-05-29 DIAGNOSIS — G89.29 CHRONIC HEEL PAIN, LEFT: ICD-10-CM

## 2025-05-29 DIAGNOSIS — I77.3 FIBROMUSCULAR DYSPLASIA OF BOTH CAROTID ARTERIES (HCC): ICD-10-CM

## 2025-05-29 DIAGNOSIS — E89.0 POSTOPERATIVE HYPOTHYROIDISM: ICD-10-CM

## 2025-05-29 DIAGNOSIS — E78.2 MIXED HYPERLIPIDEMIA: ICD-10-CM

## 2025-05-29 DIAGNOSIS — E55.9 VITAMIN D DEFICIENCY: ICD-10-CM

## 2025-05-29 DIAGNOSIS — E78.00 PURE HYPERCHOLESTEROLEMIA: ICD-10-CM

## 2025-05-29 DIAGNOSIS — R73.01 ABNORMAL FASTING GLUCOSE: ICD-10-CM

## 2025-05-29 DIAGNOSIS — I44.7 LEFT BUNDLE BRANCH BLOCK: ICD-10-CM

## 2025-05-29 DIAGNOSIS — Z00.00 HEALTH MAINTENANCE EXAMINATION: Primary | ICD-10-CM

## 2025-05-29 DIAGNOSIS — M79.672 CHRONIC HEEL PAIN, LEFT: ICD-10-CM

## 2025-05-29 DIAGNOSIS — C73 PAPILLARY CARCINOMA OF THYROID (HCC): ICD-10-CM

## 2025-05-29 PROCEDURE — 99396 PREV VISIT EST AGE 40-64: CPT | Performed by: NURSE PRACTITIONER

## 2025-05-29 RX ORDER — ASCORBIC ACID 500 MG
TABLET ORAL
COMMUNITY
Start: 2023-01-01

## 2025-05-29 RX ORDER — PNV NO.95/FERROUS FUM/FOLIC AC 28MG-0.8MG
TABLET ORAL
COMMUNITY
Start: 2023-01-01

## 2025-05-29 RX ORDER — PYRIDOXINE HCL (VITAMIN B6) 100 MG
TABLET ORAL
COMMUNITY
Start: 2023-01-01

## 2025-05-29 NOTE — ASSESSMENT & PLAN NOTE
Lab Results   Component Value Date    HGBA1C 5.6 02/19/2024     A1c stable/ borderline continue to monitor  Orders:    Comprehensive metabolic panel; Future    Hemoglobin A1C; Future

## 2025-05-29 NOTE — ASSESSMENT & PLAN NOTE
Following with endo for management.     Thyroid cancer diagnosis around 2008   Thyroidectomy done. No EISENBERG needed

## 2025-05-29 NOTE — PROGRESS NOTES
Adult Annual Physical  Name: Negin Tapia      : 1970      MRN: [id number]  Encounter Provider: PACO Marte  Encounter Date: 2025   Encounter department: [facility]    :  Assessment & Plan  Health maintenance examination  Update labs  Screenings up to date     Orders:    Lipid panel; Future    Comprehensive metabolic panel; Future    Vitamin D 25 hydroxy; Future    CBC and differential; Future    Papillary carcinoma of thyroid (HCC)  Following with endo for management.     Thyroid cancer diagnosis around    Thyroidectomy done. No EISENBERG needed            Abnormal fasting glucose  Lab Results   Component Value Date    HGBA1C 5.6 2024     A1c stable/ borderline continue to monitor  Orders:    Comprehensive metabolic panel; Future    Hemoglobin A1C; Future    Mixed hyperlipidemia  Coronary CT : 0  Lab Results   Component Value Date    LDLCALC 135 (H) 2024       Orders:    Comprehensive metabolic panel; Future    Vitamin D deficiency  Continue to monitor   Orders:    Vitamin D 25 hydroxy; Future    Pure hypercholesterolemia  Coronary CT : 0  Lab Results   Component Value Date    LDLCALC 135 (H) 2024     Orders:    Lipid panel; Future    Postoperative hypothyroidism  Managed by endocrinology        Pain of left heel  Defers further work up- will see podiatry. It is improving.        Fibromuscular dysplasia of both carotid arteries (HCC)  US updated :  In comparison to the study of 3/19/2024, there is no significant change in the  disease process.    Follows with vascular        Left bundle branch block  Asymptomatic. Follows with cardiology        Chronic heel pain, left  Offered imaging, declines at this time and prefers to f/u with podiatry            Preventive Screenings:    - Breast cancer screening: screening up-to-date     Immunizations:  - Immunizations due: Tdap and Zoster (Shingrix)      Depression Screening and Follow-up  "Plan: Patient was screened for depression during today's encounter. They screened negative with a PHQ-2 score of 0.          History of Present Illness     Adult Annual Physical:  Patient presents for annual physical. Here for well visit.   Overall doing okay. Recent ingrown toe nail removed.     Pain bottom of left heel. Hurts to touch and step on. This happened recently. Family history of diabetes. .     Diet and Physical Activity:  - Diet/Nutrition: no special diet.  - Exercise: no formal exercise. PT    Depression Screening:  - PHQ-2 Score: 0    General Health:  - Sleep: sleeps well and 4-6 hours of sleep on average.  - Hearing: normal hearing left ear.  - Vision: vision problems and wears glasses.  - Dental: brushes teeth three times daily, floss regularly and regular dental visits.    /GYN Health:  - Follows with GYN: yes.   - Menopause: postmenopausal.   - Last menstrual cycle: 6/24/2015.   - Contraception: hysterectomy.      Advanced Care Planning:  - Has an advanced directive?: no    - Has a durable medical POA?: no      Review of Systems   Constitutional:  Negative for chills and fever.   Eyes:  Negative for discharge.   Respiratory:  Negative for shortness of breath.    Cardiovascular:  Negative for chest pain.   Gastrointestinal:  Negative for constipation and diarrhea.   Genitourinary:  Negative for difficulty urinating.   Musculoskeletal:  Negative for joint swelling.   Skin:  Negative for rash.   Neurological:  Negative for headaches.   Hematological:  Negative for adenopathy.   Psychiatric/Behavioral:  The patient is not nervous/anxious.          Objective   /90   Pulse 78   Temp (!) 95.6 °F (35.3 °C)   Ht 5' 4\" (1.626 m)   Wt 84.8 kg (187 lb)   SpO2 98%   BMI 32.10 kg/m²     Physical Exam  Vitals and nursing note reviewed.   Constitutional:       General: She is not in acute distress.     Appearance: Normal appearance. She is obese. She is not ill-appearing, toxic-appearing or " diaphoretic.   HENT:      Head: Normocephalic and atraumatic.      Right Ear: External ear normal.      Left Ear: External ear normal.      Nose: Nose normal.      Mouth/Throat:      Mouth: Mucous membranes are moist.      Pharynx: Oropharynx is clear. No oropharyngeal exudate or posterior oropharyngeal erythema.     Eyes:      General: Lids are normal. No scleral icterus.        Right eye: No discharge.         Left eye: No discharge.      Extraocular Movements: Extraocular movements intact.      Conjunctiva/sclera: Conjunctivae normal.      Pupils: Pupils are equal, round, and reactive to light.       Cardiovascular:      Rate and Rhythm: Normal rate and regular rhythm. No extrasystoles are present.     Heart sounds: S1 normal and S2 normal. No murmur heard.  Pulmonary:      Effort: Pulmonary effort is normal. No respiratory distress.      Breath sounds: Normal breath sounds. No stridor or decreased air movement. No wheezing or rhonchi.   Abdominal:      General: Bowel sounds are normal.      Palpations: Abdomen is soft.     Musculoskeletal:         General: Normal range of motion.      Cervical back: Normal range of motion and neck supple.     Skin:     General: Skin is warm and dry.     Neurological:      General: No focal deficit present.      Mental Status: She is alert and oriented to person, place, and time. Mental status is at baseline.      Cranial Nerves: No cranial nerve deficit.      Sensory: No sensory deficit.      Motor: No weakness.      Coordination: Coordination normal.      Gait: Gait normal.     Psychiatric:         Attention and Perception: Attention and perception normal.         Mood and Affect: Mood and affect normal.         Speech: Speech normal.         Behavior: Behavior is cooperative.         Cognition and Memory: Cognition normal.         Judgment: Judgment normal.

## 2025-05-29 NOTE — ASSESSMENT & PLAN NOTE
Coronary CT 2024: 0  Lab Results   Component Value Date    LDLCALC 135 (H) 02/19/2024       Orders:    Comprehensive metabolic panel; Future

## 2025-05-30 PROBLEM — Z11.59 ENCOUNTER FOR HEPATITIS C SCREENING TEST FOR LOW RISK PATIENT: Status: RESOLVED | Noted: 2024-01-23 | Resolved: 2025-05-30

## 2025-05-30 NOTE — ASSESSMENT & PLAN NOTE
US updated 2024:  In comparison to the study of 3/19/2024, there is no significant change in the  disease process.    Follows with vascular

## 2025-06-17 ENCOUNTER — OFFICE VISIT (OUTPATIENT)
Dept: FAMILY MEDICINE CLINIC | Facility: CLINIC | Age: 55
End: 2025-06-17
Payer: COMMERCIAL

## 2025-06-17 VITALS
TEMPERATURE: 98.1 F | HEIGHT: 65 IN | SYSTOLIC BLOOD PRESSURE: 102 MMHG | OXYGEN SATURATION: 99 % | BODY MASS INDEX: 31.16 KG/M2 | HEART RATE: 96 BPM | DIASTOLIC BLOOD PRESSURE: 84 MMHG | WEIGHT: 187 LBS

## 2025-06-17 DIAGNOSIS — G89.29 ACUTE EXACERBATION OF CHRONIC LOW BACK PAIN: Primary | ICD-10-CM

## 2025-06-17 DIAGNOSIS — M54.50 ACUTE EXACERBATION OF CHRONIC LOW BACK PAIN: Primary | ICD-10-CM

## 2025-06-17 DIAGNOSIS — F41.8 SITUATIONAL ANXIETY: ICD-10-CM

## 2025-06-17 PROCEDURE — 99214 OFFICE O/P EST MOD 30 MIN: CPT | Performed by: FAMILY MEDICINE

## 2025-06-17 RX ORDER — LIDOCAINE 50 MG/G
1 PATCH TOPICAL EVERY 12 HOURS
COMMUNITY
Start: 2025-06-09 | End: 2026-06-09

## 2025-06-17 RX ORDER — METHOCARBAMOL 500 MG/1
500 TABLET, FILM COATED ORAL 2 TIMES DAILY PRN
Qty: 20 TABLET | Refills: 0 | Status: SHIPPED | OUTPATIENT
Start: 2025-06-17 | End: 2025-06-27

## 2025-06-17 RX ORDER — METHOCARBAMOL 500 MG/1
1000 TABLET, FILM COATED ORAL 4 TIMES DAILY PRN
COMMUNITY
Start: 2025-06-09 | End: 2025-06-17 | Stop reason: SDUPTHER

## 2025-06-17 RX ORDER — DIAZEPAM 5 MG/1
5 TABLET ORAL EVERY 6 HOURS PRN
Qty: 1 TABLET | Refills: 0 | Status: SHIPPED | OUTPATIENT
Start: 2025-06-17

## 2025-06-17 RX ORDER — METHYLPREDNISOLONE 4 MG/1
TABLET ORAL
Qty: 21 EACH | Refills: 0 | Status: SHIPPED | OUTPATIENT
Start: 2025-06-17

## 2025-06-22 PROBLEM — G89.29 ACUTE EXACERBATION OF CHRONIC LOW BACK PAIN: Status: ACTIVE | Noted: 2025-06-22

## 2025-06-22 PROBLEM — M54.50 ACUTE EXACERBATION OF CHRONIC LOW BACK PAIN: Status: ACTIVE | Noted: 2025-06-22

## 2025-06-22 PROBLEM — F41.8 SITUATIONAL ANXIETY: Status: ACTIVE | Noted: 2025-06-22

## 2025-06-22 NOTE — PROGRESS NOTES
Name: Negin Tapia      : 1970      MRN: [id number]  Encounter Provider: Marifer Huggins MD  Encounter Date: 2025   Encounter department: [facility]  :  Assessment & Plan  Acute exacerbation of chronic low back pain    Acute exacerbation of chronic back pain no recent trauma recommend Medrol pack as directed the proper use and the Robaxin discussed with the patient recommend MRI of the lumbar spine we will follow-up with the result accordingly  Orders:  •  methocarbamol (ROBAXIN) 500 mg tablet; Take 1 tablet (500 mg total) by mouth 2 (two) times a day as needed for muscle spasms for up to 10 days  •  methylPREDNISolone 4 MG tablet therapy pack; Use as directed on package  •  MRI lumbar spine wo contrast; Future    Situational anxiety  Patient usually gets the panic from close space and she is going to do MRI recommend to use Valium 5 mg to take it 30 minutes before the test  Orders:  •  diazepam (VALIUM) 5 mg tablet; Take 1 tablet (5 mg total) by mouth every 6 (six) hours as needed for anxiety      Assessment & Plan           History of Present Illness   Patient here status post ER visit in  for back pain patient known to have history of chronic back pain discogenic disease lumbar spine patient has exacerbation her pain did not any numbness or muscle weakness no drop on the foot supination motion aggravate the pain no rash denies any lose control of the urine or stool      Review of Systems   Constitutional:  Negative for activity change, appetite change, fatigue and fever.   HENT:  Negative for congestion, ear pain, sinus pressure, sinus pain and sore throat.    Eyes:  Negative for discharge and redness.   Respiratory:  Negative for cough, chest tightness and shortness of breath.    Cardiovascular:  Negative for chest pain, palpitations and leg swelling.   Gastrointestinal:  Negative for abdominal pain, constipation and diarrhea.   Genitourinary:  Negative for  "dysuria, flank pain, frequency and hematuria.   Musculoskeletal:  Positive for back pain.   Skin:  Negative for pallor and rash.   Neurological:  Negative for dizziness, tremors, weakness, numbness and headaches.   Hematological:  Does not bruise/bleed easily.       Objective   /84   Pulse 96   Temp 98.1 °F (36.7 °C) (Tympanic)   Ht 5' 5.35\" (1.66 m)   Wt 84.8 kg (187 lb)   SpO2 99%   Breastfeeding No   BMI 30.78 kg/m²      Physical Exam  Vitals and nursing note reviewed.   Constitutional:       General: She is not in acute distress.     Appearance: She is well-developed. She is not diaphoretic.   HENT:      Head: Normocephalic.      Right Ear: Tympanic membrane and external ear normal.      Left Ear: Tympanic membrane and external ear normal.      Nose: No rhinorrhea.      Mouth/Throat:      Pharynx: No posterior oropharyngeal erythema.     Eyes:      General:         Right eye: No discharge.         Left eye: No discharge.      Conjunctiva/sclera: Conjunctivae normal.     Neck:      Vascular: No JVD.     Cardiovascular:      Rate and Rhythm: Normal rate and regular rhythm.      Heart sounds: Normal heart sounds. No murmur heard.     No gallop.   Pulmonary:      Effort: Pulmonary effort is normal. No respiratory distress.      Breath sounds: Normal breath sounds. No wheezing.   Abdominal:      General: There is no distension.      Palpations: Abdomen is soft.      Tenderness: There is no abdominal tenderness. There is no rebound.     Musculoskeletal:      Cervical back: Normal range of motion and neck supple.      Lumbar back: Tenderness present. No bony tenderness. Negative right straight leg raise test and negative left straight leg raise test.   Lymphadenopathy:      Cervical: No cervical adenopathy.     Skin:     General: Skin is warm.      Findings: No rash.     Neurological:      Mental Status: She is alert and oriented to person, place, and time.         "

## 2025-06-22 NOTE — ASSESSMENT & PLAN NOTE
Patient usually gets the panic from close space and she is going to do MRI recommend to use Valium 5 mg to take it 30 minutes before the test  Orders:  •  diazepam (VALIUM) 5 mg tablet; Take 1 tablet (5 mg total) by mouth every 6 (six) hours as needed for anxiety

## 2025-06-22 NOTE — ASSESSMENT & PLAN NOTE
Acute exacerbation of chronic back pain no recent trauma recommend Medrol pack as directed the proper use and the Robaxin discussed with the patient recommend MRI of the lumbar spine we will follow-up with the result accordingly  Orders:  •  methocarbamol (ROBAXIN) 500 mg tablet; Take 1 tablet (500 mg total) by mouth 2 (two) times a day as needed for muscle spasms for up to 10 days  •  methylPREDNISolone 4 MG tablet therapy pack; Use as directed on package  •  MRI lumbar spine wo contrast; Future

## 2025-07-09 ENCOUNTER — TELEPHONE (OUTPATIENT)
Age: 55
End: 2025-07-09

## 2025-07-09 ENCOUNTER — HOSPITAL ENCOUNTER (OUTPATIENT)
Dept: RADIOLOGY | Age: 55
Discharge: HOME | End: 2025-07-09
Attending: FAMILY MEDICINE
Payer: COMMERCIAL

## 2025-07-09 DIAGNOSIS — M54.50 ACUTE EXACERBATION OF CHRONIC LOW BACK PAIN: ICD-10-CM

## 2025-07-09 DIAGNOSIS — G89.29 ACUTE EXACERBATION OF CHRONIC LOW BACK PAIN: ICD-10-CM

## 2025-07-09 PROCEDURE — 72148 MRI LUMBAR SPINE W/O DYE: CPT

## 2025-07-09 NOTE — TELEPHONE ENCOUNTER
Patient stated she has MRI scheduled for this evening at 6:15 PM. She was prescribed Valium and wants to know how soon should she take it; she doesn't want medication to wear off before appointment. Warm transfer to [facility]. Patient advised to take medication at least 30 minutes prior. Patient understood.

## 2025-07-11 ENCOUNTER — OFFICE VISIT (OUTPATIENT)
Dept: BARIATRICS | Facility: CLINIC | Age: 55
End: 2025-07-11
Payer: COMMERCIAL

## 2025-07-11 VITALS
WEIGHT: 185 LBS | RESPIRATION RATE: 16 BRPM | TEMPERATURE: 97.6 F | DIASTOLIC BLOOD PRESSURE: 82 MMHG | HEART RATE: 74 BPM | SYSTOLIC BLOOD PRESSURE: 100 MMHG | HEIGHT: 64 IN | BODY MASS INDEX: 31.58 KG/M2

## 2025-07-11 DIAGNOSIS — E78.2 MIXED HYPERLIPIDEMIA: Primary | ICD-10-CM

## 2025-07-11 DIAGNOSIS — E66.811 CLASS 1 OBESITY DUE TO EXCESS CALORIES WITH SERIOUS COMORBIDITY AND BODY MASS INDEX (BMI) OF 30.0 TO 30.9 IN ADULT: ICD-10-CM

## 2025-07-11 DIAGNOSIS — E66.09 CLASS 1 OBESITY DUE TO EXCESS CALORIES WITH SERIOUS COMORBIDITY AND BODY MASS INDEX (BMI) OF 30.0 TO 30.9 IN ADULT: ICD-10-CM

## 2025-07-11 PROCEDURE — 99204 OFFICE O/P NEW MOD 45 MIN: CPT | Performed by: PHYSICIAN ASSISTANT

## 2025-07-11 NOTE — PROGRESS NOTES
Assessment & Plan  Class 1 obesity due to excess calories with serious comorbidity and body mass index (BMI) of 30.0 to 30.9 in adult  -Discussed options of HealthyCORE-Intensive Lifestyle Intervention Program, Very Low Calorie Diet-VLCD, and Conservative Program and the role of weight loss medications  -Explained the importance of making lifestyle changes if utilizing medication to aid in weight loss  -Discussed that obesity is a chronic disease and medications are typically used long term as stopped medication will increase risk of weight gain.   -Initial weight loss goal of 5-10% weight loss for improved health  -Screening labs and records reviewed from prior  - STOP BANG- 1/8  - Initial Weight: 185  - Goal Weight: 165    -Patient is interested in pursuing Conservative Program    Goals:  -Recommend Referral to Registered Dietitian-- She will plan to follow with Georgia Chen RD at the [facility], as this visit was covered by her insurance in 2024 and out of pocket cost is too high to be seen in our office  -Recommend ~1400  Calorie diet, meal plan provided. Suggest Calorie tracking Jalen such as Nutrition [email or web address] Track.   -Reviewed low calorie snack options, plate method.   -To drink at least 64oz of water daily. No sugary beverages.  -Recommend working up to at least 150 minutes of exercise per week including full body strength training 2x per week    At this time, she is not interested in pharmacologic therapy  She is considering berberine supplement, will review safety/interactions with levothyroxine and contact her with additional info  Advised her to complete updated lab testing for TSH las last TSH was slightly high 9/26/2024     Mixed hyperlipidemia  Continue lifestyle modification  Will refer to [facility]       Return in about 3 months (around 10/11/2025).       Subjective:   Chief Complaint   Patient presents with   • Consult     MWM-Consult;Waist-41in : SB-1/8       Patient ID: Negin Tapia  is a 54 y.o. female  with excess weight/obesity here to pursue weight management.    HPI: Here for MWM consult    Obesity/Excess Weight:  Current BMI: Body mass index is 31.46 kg/m².   Onset:  Last few years, 3-5 years    Previous Weight Loss Attempts: Diet and Exercise  Contributing factors: Night time snacking may be related to boredom  Last year, did more walking at night due to neighbor  Lately-- less motive to be active  Weight Related Comorbidities:  High choleterol improved with diet    Current Weight: 185  Lost 15 pounds, has seen dietician last year  RD suggested  ~1500 calories per day  Using myfitness pal    Weight History  Highest adult weight:: (Patient-Rptd) (P) 199 lbs  Lowest adult weight:: (Patient-Rptd) (P) 125 lbs  What is your goal weight?: (Patient-Rptd) (P) 165 lbs  How long have you struggled with maintaining a healthy weight?: (Patient-Rptd) (P) Adult  What weight loss attempts have you had in the past?: (Patient-Rptd) (P) Diet, Exercise, Other    Food Behaviors  Do you have any of the following food related behaviors?: (Patient-Rptd) (P) Emotional Eating, Boredom Eating  Is there a trouble area of the day when these behaviors are more prominent?: (Patient-Rptd) (P) Yes  When:: (Patient-Rptd) (P) Evenings    Food History  Provide the time that you typically eat and common foods you eat for each meal/snack: : (Patient-Rptd) (P) Lunch, Dinner  Provide the time and common foods you eat for lunch:: (Patient-Rptd) (P) 12pm  Provide the time and common foods you eat for : (Patient-Rptd) (P) 5:30- 6pm  How often do you go out to eat or have take out?: (Patient-Rptd) (P) 3 to 4 times a week  Daily beverage intake, please select the beverages you consume daily and the amount(s):: (Patient-Rptd) (P) Water, Tea  How many cups of water?: (Patient-Rptd) (P) 6  How many cups of tea?: (Patient-Rptd) (P) 2  Do you consume alcohol?: (Patient-Rptd) (P) Yes  How many drinks per week and what type of alcohol?: (Patient-Rptd) (P) 1  "wine    Physical Activity   How often do you exercise?: (Patient-Rptd) (P) None  How long are your activity sessions?: (Patient-Rptd) (P) 30 minutes  What types of physical activity are you currently participating in?: (Patient-Rptd) (P) Cardio (elliptical, walking, running, biking, rowing, etc)  None x 2 months    Sleep  How many hours of sleep are you getting per night?: (Patient-Rptd) (P) 5  How would you rate your quality of sleep?: (Patient-Rptd) (P) Fair  Do you have a history of sleep apnea?: (Patient-Rptd) (P) No    Family/Social History  Do you have a family history of obesity?: (Patient-Rptd) (P) No    Gynecological History  If of childbearing age, are you using birth control?: (Patient-Rptd) (P) N/A  Menopausal status?: (Patient-Rptd) (P) N/A     Food Recall:  Breakfast: Usually Skips, If eating will have eggs/sausage  Lunch: Salad with tuna or chicken or hard boiled egg  Will add nuts/dried cherries \"gets boring\"  PM Snack: nature made blueberry muffin  Dinner: This varies-- sausage/potatoes or chinese food  Bedtime Snack: Was going for ice cream, past few days switched to a yogurt or Fruit gel packs.   Dining out/takeout: 4x per week         she  denies personal and family history of  pancreatitis, Medullary Cell Thyroid Cancer, or MEN-2 tumors.  +Papillary thyroid CA  Follows with Dr. Castro at [facility] endocrinology, will be retiring and establishing with new endo  Denies any hx of glaucoma, seizures, kidney stones  +Choele.  Denies Hx of CAD, PAD, palpitations, arrhythmia.   Denies uncontrolled anxiety or depression, suicidal thoughts, insomnia, or sleep disturbance.     Wt Readings from Last 20 Encounters:   07/11/25 83.9 kg (185 lb)   06/17/25 84.8 kg (187 lb)   05/29/25 84.8 kg (187 lb)   05/01/25 85.3 kg (188 lb)   04/29/25 86.2 kg (190 lb)   02/24/25 86.2 kg (190 lb)   01/24/25 86.5 kg (190 lb 12.8 oz)   10/25/24 85.4 kg (188 lb 3.2 oz)   10/02/24 85.3 kg (188 lb)   09/09/24 85.3 kg (188 lb) " "  07/23/24 85.3 kg (188 lb 1.6 oz)   04/03/24 83.6 kg (184 lb 6.4 oz)   03/07/24 83.5 kg (184 lb)   03/01/24 84.8 kg (187 lb)   02/27/24 84.9 kg (187 lb 3.2 oz)   02/19/24 84.5 kg (186 lb 3.2 oz)   01/23/24 86.2 kg (190 lb)   01/12/24 86.9 kg (191 lb 9.6 oz)   11/21/23 86.8 kg (191 lb 6.4 oz)   11/09/23 86.4 kg (190 lb 6.4 oz)        Past Medical History[1]    Past Surgical History[2]     Allergies[3]     Review of Systems    Objective:    /82   Pulse 74   Temp 97.6 °F (36.4 °C)   Resp 16   Ht 5' 4.3\" (1.633 m)   Wt 83.9 kg (185 lb)   BMI 31.46 kg/m²     Physical Exam:  Constitutional:  she  appears well-developed and well-nourished. No distress.   HENT:   Head: Normocephalic and atraumatic.   Neck: Normal range of motion.   Pulmonary/Chest: Effort normal.   Musculoskeletal: Normal range of motion.   Neurological: she  is alert and oriented to person, place, and time.   Skin: she  is not diaphoretic.   Psychiatric: she  has a normal mood and affect. her  behavior is normal.      Labs:    Lab Results   Component Value Date    HGBA1C 5.6 02/19/2024 02/19/2024   Lab Results   Component Value Date     05/03/2015    SODIUM 138 05/13/2024    K 3.7 05/13/2024     05/13/2024    CO2 27 05/13/2024    ANIONGAP 8 05/03/2015    AGAP 6 05/13/2024    BUN 15 05/13/2024    CREATININE 0.74 05/13/2024    GLUC 98 05/13/2024    GLUF 89 02/19/2024    CALCIUM 9.4 05/13/2024    AST 34 05/13/2024    ALT 50 05/13/2024    ALKPHOS 69 05/13/2024    PROT 6.9 05/03/2015    TP 7.0 05/13/2024    BILITOT 0.6 05/03/2015    TBILI 0.4 05/13/2024    EGFR 95 05/13/2024      Lab Results   Component Value Date    HDJ6DUUACOPO 1.187 03/03/2023    TSH 5.17 09/26/2024             [1]  Past Medical History:  Diagnosis Date   • Cancer (HCC)     Thyroid   • Cervical neuritis 05/16/2013    C7- T1 Left, Last assessed   • Disease of thyroid gland    • Endometriosis, cervix    • GERD (gastroesophageal reflux disease)    • Herpes    • " "Hiatal hernia     R INGUINAL   • Hypothyroidism    • Left bundle branch block    • Ovarian cyst    • Precordial chest pain 5/18/2023   • Thyroid cancer (HCC)     THYROID   [2]  Past Surgical History:  Procedure Laterality Date   • CHOLECYSTECTOMY  05/2020   • COLONOSCOPY  12/2015 2015  Anastasiya. Normal exam, hemorrhoids.   • COLONOSCOPY  2010    Dr. Langford.  Normal exam, hemorrhoids.   • COLONOSCOPY  1996    Dr. Gonzalez.  Normal exam   • HERNIA REPAIR     • HYSTERECTOMY  2005    has ovaries   • TX LAPAROSCOPY SURG CHOLECYSTECTOMY N/A 05/23/2019    Procedure: LAP. ZANE., IOCG;  Surgeon: Maya Harrison MD;  Location: [facility];  Service: General   • THYROIDECTOMY      early 2008 [facility]   • TUBAL LIGATION     [3]  Allergies  Allergen Reactions   • Bee Venom Anaphylaxis   • Dust Mite Extract    • Gabapentin Dizziness     Annotation - 22Jan2015: \"felt like out of body experience\"   • Molds & Smuts Sneezing   • Other      Annotation - 30Jun2013: seasonal, Trees   • Pollen Extract    • Bernard Grass Pollen Allergen    • Diclofenac Rash     tolerates naproxen   • Latex Rash   "

## 2025-07-11 NOTE — ASSESSMENT & PLAN NOTE
-Discussed options of HealthyCORE-Intensive Lifestyle Intervention Program, Very Low Calorie Diet-VLCD, and Conservative Program and the role of weight loss medications  -Explained the importance of making lifestyle changes if utilizing medication to aid in weight loss  -Discussed that obesity is a chronic disease and medications are typically used long term as stopped medication will increase risk of weight gain.   -Initial weight loss goal of 5-10% weight loss for improved health  -Screening labs and records reviewed from prior  - STOP BANG- 1/8  - Initial Weight: 185  - Goal Weight: 165    -Patient is interested in pursuing Conservative Program    Goals:  -Recommend Referral to Registered Dietitian-- She will plan to follow with Georgia Chen RD at the [facility], as this visit was covered by her insurance in 2024 and out of pocket cost is too high to be seen in our office  -Recommend ~1400  Calorie diet, meal plan provided. Suggest Calorie tracking Jalen such as Nutrition [employer] Track.   -Reviewed low calorie snack options, plate method.   -To drink at least 64oz of water daily. No sugary beverages.  -Recommend working up to at least 150 minutes of exercise per week including full body strength training 2x per week    At this time, she is not interested in pharmacologic therapy  She is considering berberine supplement, will review safety/interactions with levothyroxine and contact her with additional info  Advised her to complete updated lab testing for TSH las last TSH was slightly high 9/26/2024

## 2025-07-14 ENCOUNTER — APPOINTMENT (OUTPATIENT)
Dept: LAB | Age: 55
End: 2025-07-14
Payer: COMMERCIAL

## 2025-07-14 DIAGNOSIS — E55.9 VITAMIN D DEFICIENCY: ICD-10-CM

## 2025-07-14 DIAGNOSIS — Z00.00 HEALTH MAINTENANCE EXAMINATION: ICD-10-CM

## 2025-07-14 DIAGNOSIS — E78.2 MIXED HYPERLIPIDEMIA: ICD-10-CM

## 2025-07-14 DIAGNOSIS — R73.01 ABNORMAL FASTING GLUCOSE: ICD-10-CM

## 2025-07-14 DIAGNOSIS — E78.00 PURE HYPERCHOLESTEROLEMIA: ICD-10-CM

## 2025-07-14 LAB
25(OH)D3 SERPL-MCNC: 31.2 NG/ML (ref 30–100)
ALBUMIN SERPL BCG-MCNC: 4.2 G/DL (ref 3.5–5)
ALP SERPL-CCNC: 71 U/L (ref 34–104)
ALT SERPL W P-5'-P-CCNC: 27 U/L (ref 7–52)
ANION GAP SERPL CALCULATED.3IONS-SCNC: 8 MMOL/L (ref 4–13)
AST SERPL W P-5'-P-CCNC: 21 U/L (ref 13–39)
BASOPHILS # BLD AUTO: 0.05 THOUSANDS/ÂΜL (ref 0–0.1)
BASOPHILS NFR BLD AUTO: 1 % (ref 0–1)
BILIRUB SERPL-MCNC: 0.52 MG/DL (ref 0.2–1)
BUN SERPL-MCNC: 17 MG/DL (ref 5–25)
CALCIUM SERPL-MCNC: 9.3 MG/DL (ref 8.4–10.2)
CHLORIDE SERPL-SCNC: 105 MMOL/L (ref 96–108)
CHOLEST SERPL-MCNC: 204 MG/DL (ref ?–200)
CO2 SERPL-SCNC: 28 MMOL/L (ref 21–32)
CREAT SERPL-MCNC: 0.75 MG/DL (ref 0.6–1.3)
EOSINOPHIL # BLD AUTO: 0.11 THOUSAND/ÂΜL (ref 0–0.61)
EOSINOPHIL NFR BLD AUTO: 2 % (ref 0–6)
ERYTHROCYTE [DISTWIDTH] IN BLOOD BY AUTOMATED COUNT: 12.6 % (ref 11.6–15.1)
EST. AVERAGE GLUCOSE BLD GHB EST-MCNC: 111 MG/DL
GFR SERPL CREATININE-BSD FRML MDRD: 89 ML/MIN/1.73SQ M
GLUCOSE P FAST SERPL-MCNC: 93 MG/DL (ref 65–99)
HBA1C MFR BLD: 5.5 %
HCT VFR BLD AUTO: 45.7 % (ref 34.8–46.1)
HDLC SERPL-MCNC: 43 MG/DL
HGB BLD-MCNC: 15 G/DL (ref 11.5–15.4)
IMM GRANULOCYTES # BLD AUTO: 0.02 THOUSAND/UL (ref 0–0.2)
IMM GRANULOCYTES NFR BLD AUTO: 0 % (ref 0–2)
LDLC SERPL CALC-MCNC: 130 MG/DL (ref 0–100)
LYMPHOCYTES # BLD AUTO: 2.25 THOUSANDS/ÂΜL (ref 0.6–4.47)
LYMPHOCYTES NFR BLD AUTO: 36 % (ref 14–44)
MCH RBC QN AUTO: 31.4 PG (ref 26.8–34.3)
MCHC RBC AUTO-ENTMCNC: 32.8 G/DL (ref 31.4–37.4)
MCV RBC AUTO: 96 FL (ref 82–98)
MONOCYTES # BLD AUTO: 0.55 THOUSAND/ÂΜL (ref 0.17–1.22)
MONOCYTES NFR BLD AUTO: 9 % (ref 4–12)
NEUTROPHILS # BLD AUTO: 3.31 THOUSANDS/ÂΜL (ref 1.85–7.62)
NEUTS SEG NFR BLD AUTO: 52 % (ref 43–75)
NONHDLC SERPL-MCNC: 161 MG/DL
NRBC BLD AUTO-RTO: 0 /100 WBCS
PLATELET # BLD AUTO: 263 THOUSANDS/UL (ref 149–390)
PMV BLD AUTO: 12 FL (ref 8.9–12.7)
POTASSIUM SERPL-SCNC: 4.5 MMOL/L (ref 3.5–5.3)
PROT SERPL-MCNC: 6.9 G/DL (ref 6.4–8.4)
RBC # BLD AUTO: 4.77 MILLION/UL (ref 3.81–5.12)
SODIUM SERPL-SCNC: 141 MMOL/L (ref 135–147)
TRIGL SERPL-MCNC: 154 MG/DL (ref ?–150)
WBC # BLD AUTO: 6.29 THOUSAND/UL (ref 4.31–10.16)

## 2025-07-14 PROCEDURE — 80061 LIPID PANEL: CPT

## 2025-07-14 PROCEDURE — 82306 VITAMIN D 25 HYDROXY: CPT

## 2025-07-14 PROCEDURE — 85025 COMPLETE CBC W/AUTO DIFF WBC: CPT

## 2025-07-14 PROCEDURE — 36415 COLL VENOUS BLD VENIPUNCTURE: CPT

## 2025-07-14 PROCEDURE — 83036 HEMOGLOBIN GLYCOSYLATED A1C: CPT

## 2025-07-14 PROCEDURE — 80053 COMPREHEN METABOLIC PANEL: CPT

## 2025-07-15 ENCOUNTER — OFFICE VISIT (OUTPATIENT)
Dept: OBGYN CLINIC | Facility: MEDICAL CENTER | Age: 55
End: 2025-07-15
Payer: COMMERCIAL

## 2025-07-15 ENCOUNTER — RESULTS FOLLOW-UP (OUTPATIENT)
Dept: FAMILY MEDICINE CLINIC | Facility: CLINIC | Age: 55
End: 2025-07-15

## 2025-07-15 VITALS — BODY MASS INDEX: 31.31 KG/M2 | WEIGHT: 183.4 LBS | HEIGHT: 64 IN

## 2025-07-15 DIAGNOSIS — M47.816 ARTHRITIS, LUMBAR SPINE: Primary | ICD-10-CM

## 2025-07-15 DIAGNOSIS — M17.12 PRIMARY OSTEOARTHRITIS OF LEFT KNEE: Primary | ICD-10-CM

## 2025-07-15 DIAGNOSIS — M70.62 GREATER TROCHANTERIC BURSITIS OF LEFT HIP: ICD-10-CM

## 2025-07-15 DIAGNOSIS — M54.9 ACUTE BACK PAIN LESS THAN 4 WEEKS DURATION: ICD-10-CM

## 2025-07-15 PROCEDURE — 99213 OFFICE O/P EST LOW 20 MIN: CPT | Performed by: PHYSICIAN ASSISTANT

## 2025-07-15 PROCEDURE — 20610 DRAIN/INJ JOINT/BURSA W/O US: CPT | Performed by: PHYSICIAN ASSISTANT

## 2025-07-15 RX ORDER — BUPIVACAINE HYDROCHLORIDE 2.5 MG/ML
2 INJECTION, SOLUTION EPIDURAL; INFILTRATION; INTRACAUDAL; PERINEURAL
Status: COMPLETED | OUTPATIENT
Start: 2025-07-15 | End: 2025-07-15

## 2025-07-15 RX ORDER — TRIAMCINOLONE ACETONIDE 40 MG/ML
40 INJECTION, SUSPENSION INTRA-ARTICULAR; INTRAMUSCULAR
Status: COMPLETED | OUTPATIENT
Start: 2025-07-15 | End: 2025-07-15

## 2025-07-15 RX ORDER — BUPIVACAINE HYDROCHLORIDE 2.5 MG/ML
2 INJECTION, SOLUTION INFILTRATION; PERINEURAL
Status: COMPLETED | OUTPATIENT
Start: 2025-07-15 | End: 2025-07-15

## 2025-07-15 RX ADMIN — TRIAMCINOLONE ACETONIDE 40 MG: 40 INJECTION, SUSPENSION INTRA-ARTICULAR; INTRAMUSCULAR at 08:00

## 2025-07-15 RX ADMIN — BUPIVACAINE HYDROCHLORIDE 2 ML: 2.5 INJECTION, SOLUTION EPIDURAL; INFILTRATION; INTRACAUDAL; PERINEURAL at 08:00

## 2025-07-15 RX ADMIN — BUPIVACAINE HYDROCHLORIDE 2 ML: 2.5 INJECTION, SOLUTION INFILTRATION; PERINEURAL at 08:00

## 2025-07-22 ENCOUNTER — CONSULT (OUTPATIENT)
Dept: PAIN MEDICINE | Facility: CLINIC | Age: 55
End: 2025-07-22
Attending: FAMILY MEDICINE
Payer: COMMERCIAL

## 2025-07-22 VITALS — BODY MASS INDEX: 31.16 KG/M2 | WEIGHT: 187 LBS | HEIGHT: 65 IN

## 2025-07-22 DIAGNOSIS — M47.816 LUMBAR SPONDYLOSIS: ICD-10-CM

## 2025-07-22 DIAGNOSIS — M46.1 SACROILIITIS (HCC): Primary | ICD-10-CM

## 2025-07-22 PROCEDURE — 99204 OFFICE O/P NEW MOD 45 MIN: CPT | Performed by: ANESTHESIOLOGY

## 2025-07-22 NOTE — PROGRESS NOTES
Name: Negin Tapia      : 1970      MRN: [id number]  Encounter Provider: Lazaro Gil MD  Encounter Date: 2025   Encounter department: [facility]  :  Assessment & Plan  Sacroiliitis (HCC)    Orders:    Ambulatory referral to Physical Therapy; Future    Lumbar spondylosis    Orders:    Ambulatory referral to Spine & Pain Management    Ambulatory referral to Physical Therapy; Future    Pain is consistent with lumbar back strain, sacroiliitis, lumbar spondylosis accompanied by consistent moderate to severe pain on the pain scale (5+/10) with inability to participate in IADLs for >6 weeks.     Recently completed Medrol dosekpak with relief. Residaul symptoms seem myofascial vs sacroiliac mediated in nature.    Discussed R SIJ Injection- will first attempt PT for relief has pain has improved since Medorl dosekpak.    F/u in 4 weeks, can consider SIJ injection if symptoms persist and significant.    Continue NSAIDs, Tylenol.    Reviewed ED, PCP office notes.    Reviewed hemoglobin A1c, renal function, CBC and/or PT/INR prior to discussing/offering interventional modalities.    My impressions and treatment recommendations were discussed in detail with the patient who verbalized understanding and had no further questions.  Discharge instructions were provided. I personally saw and examined the patient and I agree with the above discussed plan of care.    History of Present Illness     Negin Tapia is a 54 y.o. female presenting for consultation at [facility] for exam and evaluation of ongoing R sided lower back pain for 6 weeks. Pain started following a non work related injury 23. Over the past month, the intensity of pain has been Severe. Pain is currently 3/10. Pain does interfere with age appropriate activities of daily living. Pain is nearly constant, described as dull/aching (back) with numbness and pins/needles (buttock). Patient  "denies weakness in the lower extremities. Assistance device used: None.    Worsening factors noted: standing, bending, walking, exercise, resting.   Improving factors noted: lying down.    Treatments tried:   Heat/ice: yes  PT: no  Chiropractic therapy: no  Injections: no   Previous spine surgery: No    Anticoagulation: no    Medications tried:   Tylenol, NSAIDs, oral steroids, Robaxin    Review of Systems   Respiratory:  Negative for shortness of breath.    Cardiovascular:  Negative for chest pain.   Gastrointestinal:  Negative for constipation, diarrhea, nausea and vomiting.   Musculoskeletal:  Positive for arthralgias, back pain and myalgias. Negative for gait problem and joint swelling.   Skin:  Negative for rash.   Neurological:  Negative for dizziness, seizures and weakness.   Psychiatric/Behavioral:  Positive for sleep disturbance.    All other systems reviewed and are negative.    Medical History Reviewed by provider this encounter:     .  Medications Ordered Prior to Encounter[1]      Objective   Ht 5' 4.5\" (1.638 m)   Wt 84.8 kg (187 lb)   BMI 31.60 kg/m²      Pain Score:   4  Physical Exam  Constitutional: normal, well developed, well nourished, alert, in no distress and non-toxic and no overt pain behavior.  Eyes: anicteric  HEENT: grossly intact  Neck: supple, symmetric, trachea midline and no masses   Pulmonary: even and unlabored  Cardiovascular: No edema or pitting edema present  Skin: Normal without rashes or lesions and well hydrated  Psychiatric: Mood and affect appropriate  Neurologic: Motor function is grossly intact with no focal neurologic deficits   Musculoskeletal: Posterior pelvic pain provocation. +right Berlin's test. +right SI compression test. +right SI distraction test.      Radiology Results Review: I personally reviewed the following image studies in PACS and associated radiology reports: MRI spine. My interpretation of the radiology images/reports is: multilevel spondylosis, no " significant spinal canal or foraminal stenosis.    MRI lumbar spine wo contrast: Result Notes     Marifer Huggins MD  7/15/2025  7:36 PM EDT       Arthritis of the lumbar sacral spine if patient continues to be symptomatic recommend to be evaluated by comprehensive pain specialist            Study Result    Narrative & Impression   MRI LUMBAR SPINE WITHOUT CONTRAST     INDICATION: M54.50: Low back pain, unspecified  G89.29: Other chronic pain.     COMPARISON:  None.     TECHNIQUE:  Multiplanar, multisequence imaging of the lumbar spine was performed. .        IMAGE QUALITY:  Diagnostic     FINDINGS:     VERTEBRAL BODIES:  There are 5 lumbar type vertebral bodies. Mild levoscoliosis. Grade 1 retrolisthesis at L4-5. No significant subluxation elsewhere. No destructive osseous lesions. Hemangiomas in the T11 and T12 vertebral bodies and right T12 posterior   elements.     SACRUM:  Normal signal within the sacrum. No evidence of insufficiency or stress fracture.     DISTAL CORD AND CONUS:  Normal size and signal within the distal cord and conus.     PARASPINAL SOFT TISSUES: Mild paraspinous muscular fatty atrophy.     LOWER THORACIC DISC SPACES:  Mild noncompressive lower thoracic degenerative change.     LUMBAR DISC SPACES:     L1-L2: Disc height loss. Shallow disc bulge. Mild bilateral facet arthrosis. No canal or foraminal stenosis.     L2-L3: Mild bilateral facet arthrosis. Mild disc height loss and shallow disc bulge. No stenosis.     L3-L4: Mild bilateral facet arthrosis and ligamentum flavum thickening. Disc height loss and shallow disc bulge. No canal or left foraminal stenosis. Minimal right foraminal narrowing.     L4-L5: Disc height loss. Broad disc bulge and marginal osteophytes and mild bilateral facet arthrosis and ligamentum flavum thickening. No significant canal stenosis. No left foraminal narrowing. Mild right foraminal narrowing.     L5-S1: Bilateral facet arthrosis. No significant stenosis.     OTHER  FINDINGS:  None.     IMPRESSION:     Multilevel degenerative changes as described. No significant canal stenosis at any level. Multilevel foraminal narrowing as outlined above.        Workstation performed: [id number]               [1]   Current Outpatient Medications on File Prior to Visit   Medication Sig Dispense Refill    aspirin 81 mg chewable tablet Chew 1 tablet (81 mg total) daily      Calcium Carbonate-Vit D-Min (Calcium 600+D3 Plus Minerals) 600-800 MG-UNIT TABS       Cetirizine HCl (ZyrTEC Allergy) 10 MG CAPS       Chelated Zinc 50 MG TABS       cholecalciferol (VITAMIN D3) 1,000 units tablet Take 2,000 Units by mouth in the morning.      Glucosamine-Chondroit-Vit C-Mn (Glucosamine 1500 Complex) CAPS       levothyroxine 88 mcg tablet 100 mcg      Magnesium 400 MG TABS       Multiple Vitamin (VITAMIN E/FOLIC ACID/B-6/B-12 PO)       Multiple Vitamins-Minerals (multivitamin with minerals) tablet Take 1 tablet by mouth in the morning.      Pyridoxine HCl (vitamin B-6) 100 MG TABS       Turmeric 500 MG CAPS Take 1 tablet by mouth in the morning.      diazepam (VALIUM) 5 mg tablet Take 1 tablet (5 mg total) by mouth every 6 (six) hours as needed for anxiety 1 tablet 0    lidocaine (LIDODERM) 5 % Place 1 patch on the skin every 12 (twelve) hours      Lysine 1000 MG TABS       methocarbamol (ROBAXIN) 500 mg tablet Take 1 tablet (500 mg total) by mouth 2 (two) times a day as needed for muscle spasms for up to 10 days 20 tablet 0    methylPREDNISolone 4 MG tablet therapy pack Use as directed on package 21 each 0     No current facility-administered medications on file prior to visit.

## 2025-07-24 DIAGNOSIS — E89.0 POSTOPERATIVE HYPOTHYROIDISM: Primary | ICD-10-CM

## 2025-08-07 ENCOUNTER — EVALUATION (OUTPATIENT)
Dept: PHYSICAL THERAPY | Facility: REHABILITATION | Age: 55
End: 2025-08-07
Attending: ANESTHESIOLOGY
Payer: COMMERCIAL

## 2025-08-07 DIAGNOSIS — M46.1 SACROILIITIS (HCC): Primary | ICD-10-CM

## 2025-08-07 DIAGNOSIS — M47.816 LUMBAR SPONDYLOSIS: ICD-10-CM

## 2025-08-07 PROCEDURE — 97161 PT EVAL LOW COMPLEX 20 MIN: CPT

## 2025-08-07 PROCEDURE — 97110 THERAPEUTIC EXERCISES: CPT

## 2025-08-15 ENCOUNTER — OFFICE VISIT (OUTPATIENT)
Dept: PHYSICAL THERAPY | Facility: REHABILITATION | Age: 55
End: 2025-08-15
Attending: ANESTHESIOLOGY
Payer: COMMERCIAL

## 2025-08-20 ENCOUNTER — OFFICE VISIT (OUTPATIENT)
Dept: PHYSICAL THERAPY | Facility: REHABILITATION | Age: 55
End: 2025-08-20
Attending: ANESTHESIOLOGY
Payer: COMMERCIAL

## 2025-08-20 DIAGNOSIS — M47.816 LUMBAR SPONDYLOSIS: ICD-10-CM

## 2025-08-20 DIAGNOSIS — M46.1 SACROILIITIS (HCC): Primary | ICD-10-CM

## 2025-08-20 PROCEDURE — 97140 MANUAL THERAPY 1/> REGIONS: CPT

## 2025-08-20 PROCEDURE — 97112 NEUROMUSCULAR REEDUCATION: CPT

## 2025-08-20 PROCEDURE — 97110 THERAPEUTIC EXERCISES: CPT

## 2025-08-22 ENCOUNTER — OFFICE VISIT (OUTPATIENT)
Dept: PHYSICAL THERAPY | Facility: REHABILITATION | Age: 55
End: 2025-08-22
Attending: ANESTHESIOLOGY
Payer: COMMERCIAL

## 2025-08-22 DIAGNOSIS — M46.1 SACROILIITIS (HCC): Primary | ICD-10-CM

## 2025-08-22 DIAGNOSIS — M47.816 LUMBAR SPONDYLOSIS: ICD-10-CM

## 2025-08-22 PROCEDURE — 97110 THERAPEUTIC EXERCISES: CPT

## 2025-08-22 PROCEDURE — 97140 MANUAL THERAPY 1/> REGIONS: CPT

## 2025-08-22 PROCEDURE — 97112 NEUROMUSCULAR REEDUCATION: CPT

## (undated) DEVICE — 2963 MEDIPORE SOFT CLOTH TAPE 3 IN X 10 YD 12 RLS/CS: Brand: 3M™ MEDIPORE™

## (undated) DEVICE — 3M™ STERI-STRIP™ COMPOUND BENZOIN TINCTURE 40 BAGS/CARTON 4 CARTONS/CASE C1544: Brand: 3M™ STERI-STRIP™

## (undated) DEVICE — INTENDED FOR TISSUE SEPARATION, AND OTHER PROCEDURES THAT REQUIRE A SHARP SURGICAL BLADE TO PUNCTURE OR CUT.: Brand: BARD-PARKER SAFETY BLADES SIZE 15, STERILE

## (undated) DEVICE — SPONGE 4 X 4 XRAY 16 PLY STRL LF RFD

## (undated) DEVICE — GLOVE PI ULTRA TOUCH SZ.6.5

## (undated) DEVICE — IV CATH 14 G SAFETY

## (undated) DEVICE — TROCAR: Brand: KII FIOS FIRST ENTRY

## (undated) DEVICE — DRAPE C-ARM X-RAY

## (undated) DEVICE — IRRIG ENDO FLO TUBING

## (undated) DEVICE — GLOVE INDICATOR PI UNDERGLOVE SZ 7 BLUE

## (undated) DEVICE — TAUT CATH INTRODUCER 4.5 FR

## (undated) DEVICE — TAUT INTRODUCER KIT

## (undated) DEVICE — CHLORAPREP HI-LITE 26ML ORANGE

## (undated) DEVICE — DRAPE EQUIPMENT RF WAND

## (undated) DEVICE — GLOVE PI ULTRA TOUCH SZ.7.0

## (undated) DEVICE — BLUE HEAT SCOPE WARMER

## (undated) DEVICE — STOPCOCK 3-WAY

## (undated) DEVICE — ENDOPATH 5MM CURVED SCISSORS WITH MONOPOLAR CAUTERY: Brand: ENDOPATH

## (undated) DEVICE — HARMONIC ACE 5MM DIAMETER SHEARS 36CM SHAFT LENGTH + ADAPTIVE TISSUE TECHNOLOGY FOR USE WITH GENERATOR G11: Brand: HARMONIC ACE

## (undated) DEVICE — 2000CC GUARDIAN II: Brand: GUARDIAN

## (undated) DEVICE — 3M™ STERI-STRIP™ REINFORCED ADHESIVE SKIN CLOSURES, R1547, 1/2 IN X 4 IN (12 MM X 100 MM), 6 STRIPS/ENVELOPE: Brand: 3M™ STERI-STRIP™

## (undated) DEVICE — LIGACLIP 10-M/L, 10MM ENDOSCOPIC ROTATING MULTIPLE CLIP APPLIERS: Brand: LIGACLIP

## (undated) DEVICE — [HIGH FLOW INSUFFLATOR,  DO NOT USE IF PACKAGE IS DAMAGED,  KEEP DRY,  KEEP AWAY FROM SUNLIGHT,  PROTECT FROM HEAT AND RADIOACTIVE SOURCES.]: Brand: PNEUMOSURE

## (undated) DEVICE — GLOVE SRG BIOGEL 7

## (undated) DEVICE — GLOVE SRG BIOGEL 6.5

## (undated) DEVICE — SYRINGE 30ML LL

## (undated) DEVICE — LAPAROSCOPIC SMOKE EVAC TUBING

## (undated) DEVICE — SUT MONOCRYL 4-0 PS-2 27 IN Y426H

## (undated) DEVICE — IV EXTENSION TUBING 33 IN

## (undated) DEVICE — SCD SEQUENTIAL COMPRESSION COMFORT SLEEVE MEDIUM KNEE LENGTH: Brand: KENDALL SCD

## (undated) DEVICE — ALLENTOWN LAP CHOLE APP PACK: Brand: CARDINAL HEALTH

## (undated) DEVICE — TELFA NON-ADHERENT ABSORBENT DRESSING: Brand: TELFA

## (undated) DEVICE — ELECTRODE LAP J HOOK E-Z CLEAN 33CM-0021

## (undated) DEVICE — GLOVE INDICATOR PI UNDERGLOVE SZ 7.5 BLUE

## (undated) DEVICE — NEEDLE HYPO 22G X 1-1/2 IN

## (undated) DEVICE — GLOVE INDICATOR PI UNDERGLOVE SZ 6.5 BLUE

## (undated) DEVICE — 5 MM CURVED DISSECTORS WITH MONOPOLAR CAUTERY: Brand: ENDOPATH

## (undated) DEVICE — TROCAR: Brand: KII® SLEEVE

## (undated) DEVICE — SPONGE LAP 18 X 18 IN STRL RFD

## (undated) DEVICE — ENDOPOUCH RETRIEVER SPECIMEN RETRIEVAL BAGS: Brand: ENDOPOUCH RETRIEVER